# Patient Record
Sex: FEMALE | Race: WHITE | Employment: FULL TIME | URBAN - METROPOLITAN AREA
[De-identification: names, ages, dates, MRNs, and addresses within clinical notes are randomized per-mention and may not be internally consistent; named-entity substitution may affect disease eponyms.]

---

## 2017-01-09 RX ORDER — ACETAMINOPHEN 160 MG
2000 TABLET,DISINTEGRATING ORAL DAILY
COMMUNITY
End: 2018-06-29

## 2017-02-13 ENCOUNTER — LAB ENCOUNTER (OUTPATIENT)
Dept: LAB | Facility: HOSPITAL | Age: 62
End: 2017-02-13
Payer: COMMERCIAL

## 2017-02-13 ENCOUNTER — HOSPITAL ENCOUNTER (OUTPATIENT)
Dept: PHYSICAL THERAPY | Facility: HOSPITAL | Age: 62
Setting detail: THERAPIES SERIES
Discharge: HOME | End: 2017-02-13
Attending: ORTHOPAEDIC SURGERY
Payer: COMMERCIAL

## 2017-02-13 DIAGNOSIS — M17.11 PRIMARY OSTEOARTHRITIS OF RIGHT KNEE: ICD-10-CM

## 2017-02-13 LAB
ANTIBODY SCREEN: NEGATIVE
RH BLOOD TYPE: POSITIVE

## 2017-02-13 PROCEDURE — 86900 BLOOD TYPING SEROLOGIC ABO: CPT

## 2017-02-13 PROCEDURE — 86901 BLOOD TYPING SEROLOGIC RH(D): CPT

## 2017-02-13 PROCEDURE — 86850 RBC ANTIBODY SCREEN: CPT

## 2017-02-13 PROCEDURE — 36415 COLL VENOUS BLD VENIPUNCTURE: CPT

## 2017-02-14 LAB
BASOPHILS # BLD AUTO: 0.05 X10(3) UL (ref 0–0.1)
BASOPHILS NFR BLD AUTO: 0.9 %
BUN BLD-MCNC: 9 MG/DL (ref 8–20)
CALCIUM BLD-MCNC: 9.3 MG/DL (ref 8.3–10.3)
CHLORIDE: 101 MMOL/L (ref 101–111)
CO2: 31 MMOL/L (ref 22–32)
CREAT BLD-MCNC: 0.79 MG/DL (ref 0.55–1.02)
EOSINOPHIL # BLD AUTO: 0.15 X10(3) UL (ref 0–0.3)
EOSINOPHIL NFR BLD AUTO: 2.6 %
ERYTHROCYTE [DISTWIDTH] IN BLOOD BY AUTOMATED COUNT: 12.7 % (ref 11.5–16)
GLUCOSE BLD-MCNC: 101 MG/DL (ref 70–99)
HCT VFR BLD AUTO: 42.2 % (ref 34–50)
HGB BLD-MCNC: 14.3 G/DL (ref 12–16)
IMMATURE GRANULOCYTE COUNT: 0.02 X10(3) UL (ref 0–1)
IMMATURE GRANULOCYTE RATIO %: 0.3 %
LYMPHOCYTES # BLD AUTO: 1.6 X10(3) UL (ref 0.9–4)
LYMPHOCYTES NFR BLD AUTO: 27.6 %
MCH RBC QN AUTO: 29.4 PG (ref 27–33.2)
MCHC RBC AUTO-ENTMCNC: 33.9 G/DL (ref 31–37)
MCV RBC AUTO: 86.8 FL (ref 81–100)
MONOCYTES # BLD AUTO: 0.5 X10(3) UL (ref 0.1–0.6)
MONOCYTES NFR BLD AUTO: 8.6 %
NEUTROPHIL ABS PRELIM: 3.48 X10 (3) UL (ref 1.3–6.7)
NEUTROPHILS # BLD AUTO: 3.48 X10(3) UL (ref 1.3–6.7)
NEUTROPHILS NFR BLD AUTO: 60 %
PLATELET # BLD AUTO: 255 10(3)UL (ref 150–450)
POTASSIUM SERPL-SCNC: 4.1 MMOL/L (ref 3.6–5.1)
RBC # BLD AUTO: 4.86 X10(6)UL (ref 3.8–5.1)
RED CELL DISTRIBUTION WIDTH-SD: 40 FL (ref 35.1–46.3)
SODIUM SERPL-SCNC: 138 MMOL/L (ref 136–144)
WBC # BLD AUTO: 5.8 X10(3) UL (ref 4–13)

## 2017-02-14 PROCEDURE — 87081 CULTURE SCREEN ONLY: CPT

## 2017-02-14 PROCEDURE — 80048 BASIC METABOLIC PNL TOTAL CA: CPT

## 2017-02-14 PROCEDURE — 85025 COMPLETE CBC W/AUTO DIFF WBC: CPT

## 2017-02-20 NOTE — H&P
[facility]    PATIENT'S NAME: [facility]   ATTENDING PHYSICIAN: Nisha Hernandez M.D.    PATIENT ACCOUNT#:   [de-identified]    LOCATION:  OR   [facility]  MEDICAL RECORD #:   [id number]       YOB: 1955  ADMISSION DATE:       02/21/2017    H postoperative scenarios are stressed. Possibility of continued pain, stiffness, vascular compromise, DVT, infection, hemarthrosis, surgical failure, residual disability is understood. She is well counseled. She will go to surgery with informed consent.

## 2017-02-21 ENCOUNTER — APPOINTMENT (OUTPATIENT)
Dept: GENERAL RADIOLOGY | Facility: HOSPITAL | Age: 62
DRG: 470 | End: 2017-02-21
Attending: RADIOLOGY
Payer: COMMERCIAL

## 2017-02-21 ENCOUNTER — SURGERY (OUTPATIENT)
Age: 62
End: 2017-02-21

## 2017-02-21 ENCOUNTER — HOSPITAL ENCOUNTER (INPATIENT)
Facility: HOSPITAL | Age: 62
LOS: 2 days | Discharge: HOME HEALTH | DRG: 470 | End: 2017-02-23
Attending: ORTHOPAEDIC SURGERY | Admitting: ORTHOPAEDIC SURGERY
Payer: COMMERCIAL

## 2017-02-21 DIAGNOSIS — M17.11 PRIMARY OSTEOARTHRITIS OF RIGHT KNEE: Primary | ICD-10-CM

## 2017-02-21 LAB — CREAT BLD-MCNC: 0.8 MG/DL (ref 0.55–1.02)

## 2017-02-21 PROCEDURE — 97116 GAIT TRAINING THERAPY: CPT

## 2017-02-21 PROCEDURE — 73560 X-RAY EXAM OF KNEE 1 OR 2: CPT

## 2017-02-21 PROCEDURE — 97161 PT EVAL LOW COMPLEX 20 MIN: CPT

## 2017-02-21 PROCEDURE — 82565 ASSAY OF CREATININE: CPT | Performed by: ORTHOPAEDIC SURGERY

## 2017-02-21 PROCEDURE — 88305 TISSUE EXAM BY PATHOLOGIST: CPT | Performed by: ORTHOPAEDIC SURGERY

## 2017-02-21 PROCEDURE — 97530 THERAPEUTIC ACTIVITIES: CPT

## 2017-02-21 PROCEDURE — 0SRC0J9 REPLACEMENT OF RIGHT KNEE JOINT WITH SYNTHETIC SUBSTITUTE, CEMENTED, OPEN APPROACH: ICD-10-PCS | Performed by: ORTHOPAEDIC SURGERY

## 2017-02-21 PROCEDURE — 88311 DECALCIFY TISSUE: CPT | Performed by: ORTHOPAEDIC SURGERY

## 2017-02-21 PROCEDURE — 76942 ECHO GUIDE FOR BIOPSY: CPT | Performed by: ORTHOPAEDIC SURGERY

## 2017-02-21 PROCEDURE — 3E0T3CZ INTRODUCTION OF REGIONAL ANESTHETIC INTO PERIPHERAL NERVES AND PLEXI, PERCUTANEOUS APPROACH: ICD-10-PCS | Performed by: ANESTHESIOLOGY

## 2017-02-21 DEVICE — ALL POLY PAT VE 32MM: Type: IMPLANTABLE DEVICE | Site: KNEE | Status: FUNCTIONAL

## 2017-02-21 DEVICE — PSI PSN PREF CR PIN GUIDE: Type: IMPLANTABLE DEVICE | Site: KNEE | Status: FUNCTIONAL

## 2017-02-21 DEVICE — PSN STR HYB ST 14X+30 M: Type: IMPLANTABLE DEVICE | Site: KNEE | Status: FUNCTIONAL

## 2017-02-21 DEVICE — KIT TISS CLOS TISSEEL 4ML: Type: IMPLANTABLE DEVICE | Site: KNEE | Status: FUNCTIONAL

## 2017-02-21 DEVICE — CEMENT BONE ZIM PALICOS R +G: Type: IMPLANTABLE DEVICE | Site: KNEE | Status: FUNCTIONAL

## 2017-02-21 DEVICE — PSN TIB STM 5 DEG SZ E R: Type: IMPLANTABLE DEVICE | Site: KNEE | Status: FUNCTIONAL

## 2017-02-21 RX ORDER — OXYCODONE HYDROCHLORIDE 10 MG/1
20 TABLET ORAL EVERY 4 HOURS PRN
Status: ACTIVE | OUTPATIENT
Start: 2017-02-21 | End: 2017-02-22

## 2017-02-21 RX ORDER — OXYCODONE HYDROCHLORIDE 10 MG/1
10 TABLET ORAL EVERY 4 HOURS PRN
Status: DISPENSED | OUTPATIENT
Start: 2017-02-21 | End: 2017-02-22

## 2017-02-21 RX ORDER — MELATONIN
325
Status: DISCONTINUED | OUTPATIENT
Start: 2017-02-21 | End: 2017-02-23

## 2017-02-21 RX ORDER — ACETAMINOPHEN 325 MG/1
650 TABLET ORAL ONCE
Status: COMPLETED | OUTPATIENT
Start: 2017-02-21 | End: 2017-02-21

## 2017-02-21 RX ORDER — HYDROCODONE BITARTRATE AND ACETAMINOPHEN 10; 325 MG/1; MG/1
1 TABLET ORAL EVERY 4 HOURS PRN
Status: DISCONTINUED | OUTPATIENT
Start: 2017-02-22 | End: 2017-02-23

## 2017-02-21 RX ORDER — HYDROCODONE BITARTRATE AND ACETAMINOPHEN 10; 325 MG/1; MG/1
2 TABLET ORAL EVERY 4 HOURS PRN
Status: DISCONTINUED | OUTPATIENT
Start: 2017-02-22 | End: 2017-02-23

## 2017-02-21 RX ORDER — ACETAMINOPHEN 325 MG/1
TABLET ORAL
Status: COMPLETED
Start: 2017-02-21 | End: 2017-02-21

## 2017-02-21 RX ORDER — KETOROLAC TROMETHAMINE 30 MG/ML
30 INJECTION, SOLUTION INTRAMUSCULAR; INTRAVENOUS EVERY 6 HOURS
Status: COMPLETED | OUTPATIENT
Start: 2017-02-21 | End: 2017-02-22

## 2017-02-21 RX ORDER — SCOLOPAMINE TRANSDERMAL SYSTEM 1 MG/1
1 PATCH, EXTENDED RELEASE TRANSDERMAL ONCE
Status: DISCONTINUED | OUTPATIENT
Start: 2017-02-21 | End: 2017-02-23

## 2017-02-21 RX ORDER — OXYCODONE HCL 10 MG/1
10 TABLET, FILM COATED, EXTENDED RELEASE ORAL
Status: COMPLETED | OUTPATIENT
Start: 2017-02-21 | End: 2017-02-21

## 2017-02-21 RX ORDER — CYCLOBENZAPRINE HCL 10 MG
10 TABLET ORAL EVERY 8 HOURS PRN
Status: DISCONTINUED | OUTPATIENT
Start: 2017-02-21 | End: 2017-02-21

## 2017-02-21 RX ORDER — POLYETHYLENE GLYCOL 3350 17 G/17G
17 POWDER, FOR SOLUTION ORAL DAILY PRN
Status: DISCONTINUED | OUTPATIENT
Start: 2017-02-21 | End: 2017-02-23

## 2017-02-21 RX ORDER — HYDROMORPHONE HYDROCHLORIDE 1 MG/ML
0.5 INJECTION, SOLUTION INTRAMUSCULAR; INTRAVENOUS; SUBCUTANEOUS EVERY 2 HOUR PRN
Status: ACTIVE | OUTPATIENT
Start: 2017-02-21 | End: 2017-02-23

## 2017-02-21 RX ORDER — DOCUSATE SODIUM 100 MG/1
100 CAPSULE, LIQUID FILLED ORAL 2 TIMES DAILY
Status: DISCONTINUED | OUTPATIENT
Start: 2017-02-21 | End: 2017-02-23

## 2017-02-21 RX ORDER — SODIUM CHLORIDE, SODIUM LACTATE, POTASSIUM CHLORIDE, CALCIUM CHLORIDE 600; 310; 30; 20 MG/100ML; MG/100ML; MG/100ML; MG/100ML
INJECTION, SOLUTION INTRAVENOUS CONTINUOUS
Status: DISCONTINUED | OUTPATIENT
Start: 2017-02-21 | End: 2017-02-21 | Stop reason: HOSPADM

## 2017-02-21 RX ORDER — MIDAZOLAM HYDROCHLORIDE 1 MG/ML
1 INJECTION INTRAMUSCULAR; INTRAVENOUS EVERY 5 MIN PRN
Status: DISCONTINUED | OUTPATIENT
Start: 2017-02-21 | End: 2017-02-21 | Stop reason: HOSPADM

## 2017-02-21 RX ORDER — HYDROMORPHONE HYDROCHLORIDE 1 MG/ML
0.2 INJECTION, SOLUTION INTRAMUSCULAR; INTRAVENOUS; SUBCUTANEOUS EVERY 2 HOUR PRN
Status: ACTIVE | OUTPATIENT
Start: 2017-02-21 | End: 2017-02-23

## 2017-02-21 RX ORDER — CETIRIZINE HYDROCHLORIDE 10 MG/1
10 TABLET ORAL DAILY
Status: DISCONTINUED | OUTPATIENT
Start: 2017-02-22 | End: 2017-02-23

## 2017-02-21 RX ORDER — METOCLOPRAMIDE HYDROCHLORIDE 5 MG/ML
10 INJECTION INTRAMUSCULAR; INTRAVENOUS EVERY 6 HOURS PRN
Status: ACTIVE | OUTPATIENT
Start: 2017-02-21 | End: 2017-02-23

## 2017-02-21 RX ORDER — OXYCODONE HYDROCHLORIDE 15 MG/1
15 TABLET ORAL EVERY 4 HOURS PRN
Status: ACTIVE | OUTPATIENT
Start: 2017-02-21 | End: 2017-02-22

## 2017-02-21 RX ORDER — OXYCODONE HYDROCHLORIDE 5 MG/1
5 TABLET ORAL EVERY 4 HOURS PRN
Status: ACTIVE | OUTPATIENT
Start: 2017-02-21 | End: 2017-02-22

## 2017-02-21 RX ORDER — BISACODYL 10 MG
10 SUPPOSITORY, RECTAL RECTAL
Status: DISCONTINUED | OUTPATIENT
Start: 2017-02-21 | End: 2017-02-23

## 2017-02-21 RX ORDER — DIPHENHYDRAMINE HCL 25 MG
25 CAPSULE ORAL EVERY 4 HOURS PRN
Status: DISCONTINUED | OUTPATIENT
Start: 2017-02-21 | End: 2017-02-21

## 2017-02-21 RX ORDER — HYDROMORPHONE HYDROCHLORIDE 1 MG/ML
INJECTION, SOLUTION INTRAMUSCULAR; INTRAVENOUS; SUBCUTANEOUS
Status: COMPLETED
Start: 2017-02-21 | End: 2017-02-21

## 2017-02-21 RX ORDER — METOCLOPRAMIDE HYDROCHLORIDE 5 MG/ML
10 INJECTION INTRAMUSCULAR; INTRAVENOUS AS NEEDED
Status: DISCONTINUED | OUTPATIENT
Start: 2017-02-21 | End: 2017-02-21 | Stop reason: HOSPADM

## 2017-02-21 RX ORDER — DIPHENHYDRAMINE HYDROCHLORIDE 50 MG/ML
25 INJECTION INTRAMUSCULAR; INTRAVENOUS ONCE AS NEEDED
Status: DISCONTINUED | OUTPATIENT
Start: 2017-02-21 | End: 2017-02-21

## 2017-02-21 RX ORDER — CYCLOBENZAPRINE HCL 5 MG
5 TABLET ORAL EVERY 8 HOURS PRN
Status: DISCONTINUED | OUTPATIENT
Start: 2017-02-21 | End: 2017-02-23

## 2017-02-21 RX ORDER — ACETAMINOPHEN 325 MG/1
650 TABLET ORAL 4 TIMES DAILY
Status: COMPLETED | OUTPATIENT
Start: 2017-02-21 | End: 2017-02-21

## 2017-02-21 RX ORDER — ONDANSETRON 2 MG/ML
4 INJECTION INTRAMUSCULAR; INTRAVENOUS EVERY 4 HOURS PRN
Status: DISCONTINUED | OUTPATIENT
Start: 2017-02-21 | End: 2017-02-23

## 2017-02-21 RX ORDER — ONDANSETRON 2 MG/ML
4 INJECTION INTRAMUSCULAR; INTRAVENOUS AS NEEDED
Status: DISCONTINUED | OUTPATIENT
Start: 2017-02-21 | End: 2017-02-21 | Stop reason: HOSPADM

## 2017-02-21 RX ORDER — DIPHENHYDRAMINE HYDROCHLORIDE 50 MG/ML
12.5 INJECTION INTRAMUSCULAR; INTRAVENOUS AS NEEDED
Status: DISCONTINUED | OUTPATIENT
Start: 2017-02-21 | End: 2017-02-21 | Stop reason: HOSPADM

## 2017-02-21 RX ORDER — ZOLPIDEM TARTRATE 5 MG/1
5 TABLET ORAL NIGHTLY PRN
Status: DISCONTINUED | OUTPATIENT
Start: 2017-02-21 | End: 2017-02-23

## 2017-02-21 RX ORDER — HYDROMORPHONE HYDROCHLORIDE 1 MG/ML
0.4 INJECTION, SOLUTION INTRAMUSCULAR; INTRAVENOUS; SUBCUTANEOUS EVERY 2 HOUR PRN
Status: ACTIVE | OUTPATIENT
Start: 2017-02-21 | End: 2017-02-23

## 2017-02-21 RX ORDER — ACETAMINOPHEN 160 MG
2000 TABLET,DISINTEGRATING ORAL DAILY
Status: DISCONTINUED | OUTPATIENT
Start: 2017-02-21 | End: 2017-02-23

## 2017-02-21 RX ORDER — SODIUM CHLORIDE, SODIUM LACTATE, POTASSIUM CHLORIDE, CALCIUM CHLORIDE 600; 310; 30; 20 MG/100ML; MG/100ML; MG/100ML; MG/100ML
INJECTION, SOLUTION INTRAVENOUS CONTINUOUS
Status: DISCONTINUED | OUTPATIENT
Start: 2017-02-21 | End: 2017-02-23

## 2017-02-21 RX ORDER — OXYCODONE HCL 10 MG/1
10 TABLET, FILM COATED, EXTENDED RELEASE ORAL
Status: COMPLETED | OUTPATIENT
Start: 2017-02-21 | End: 2017-02-22

## 2017-02-21 RX ORDER — SODIUM CHLORIDE 9 MG/ML
INJECTION, SOLUTION INTRAVENOUS CONTINUOUS
Status: DISCONTINUED | OUTPATIENT
Start: 2017-02-21 | End: 2017-02-23

## 2017-02-21 RX ORDER — PANTOPRAZOLE SODIUM 20 MG/1
20 TABLET, DELAYED RELEASE ORAL
Status: DISCONTINUED | OUTPATIENT
Start: 2017-02-22 | End: 2017-02-23

## 2017-02-21 RX ORDER — DIPHENHYDRAMINE HYDROCHLORIDE 50 MG/ML
12.5 INJECTION INTRAMUSCULAR; INTRAVENOUS EVERY 4 HOURS PRN
Status: DISCONTINUED | OUTPATIENT
Start: 2017-02-21 | End: 2017-02-21

## 2017-02-21 RX ORDER — CYCLOBENZAPRINE HCL 5 MG
5 TABLET ORAL 3 TIMES DAILY PRN
Status: DISCONTINUED | OUTPATIENT
Start: 2017-02-21 | End: 2017-02-21 | Stop reason: HOSPADM

## 2017-02-21 RX ORDER — HYDROMORPHONE HYDROCHLORIDE 1 MG/ML
0.4 INJECTION, SOLUTION INTRAMUSCULAR; INTRAVENOUS; SUBCUTANEOUS EVERY 5 MIN PRN
Status: DISCONTINUED | OUTPATIENT
Start: 2017-02-21 | End: 2017-02-21 | Stop reason: HOSPADM

## 2017-02-21 RX ORDER — MEPERIDINE HYDROCHLORIDE 25 MG/ML
12.5 INJECTION INTRAMUSCULAR; INTRAVENOUS; SUBCUTANEOUS AS NEEDED
Status: DISCONTINUED | OUTPATIENT
Start: 2017-02-21 | End: 2017-02-21 | Stop reason: HOSPADM

## 2017-02-21 RX ORDER — HYDROMORPHONE HYDROCHLORIDE 1 MG/ML
0.3 INJECTION, SOLUTION INTRAMUSCULAR; INTRAVENOUS; SUBCUTANEOUS EVERY 2 HOUR PRN
Status: ACTIVE | OUTPATIENT
Start: 2017-02-21 | End: 2017-02-23

## 2017-02-21 RX ORDER — DEXAMETHASONE SODIUM PHOSPHATE 4 MG/ML
4 VIAL (ML) INJECTION AS NEEDED
Status: DISCONTINUED | OUTPATIENT
Start: 2017-02-21 | End: 2017-02-21 | Stop reason: HOSPADM

## 2017-02-21 RX ORDER — ATORVASTATIN CALCIUM 10 MG/1
10 TABLET, FILM COATED ORAL NIGHTLY
Status: DISCONTINUED | OUTPATIENT
Start: 2017-02-21 | End: 2017-02-23

## 2017-02-21 RX ORDER — NALOXONE HYDROCHLORIDE 0.4 MG/ML
80 INJECTION, SOLUTION INTRAMUSCULAR; INTRAVENOUS; SUBCUTANEOUS AS NEEDED
Status: DISCONTINUED | OUTPATIENT
Start: 2017-02-21 | End: 2017-02-21 | Stop reason: HOSPADM

## 2017-02-21 RX ORDER — SODIUM PHOSPHATE, DIBASIC AND SODIUM PHOSPHATE, MONOBASIC 7; 19 G/133ML; G/133ML
1 ENEMA RECTAL ONCE AS NEEDED
Status: ACTIVE | OUTPATIENT
Start: 2017-02-21 | End: 2017-02-21

## 2017-02-21 NOTE — BRIEF OP NOTE
[address] [facility]  Brief Op Note     Whitney Landon Location: OR   [facility] [id number] MRN [id number]   Admission Date 2/21/2017 Operation Date 2/21/2017   Attending Physician Maria M Wheat MD Operating Physician Chiqui Mensah MD       Pre-Operativ

## 2017-02-21 NOTE — INTERVAL H&P NOTE
Pre-op Diagnosis: OSTEOARTHRITIS RIGHT KNEE    The above referenced H&P was reviewed by Shamar Salinas MD on 2/21/2017, the patient was examined and no significant changes have occurred in the patient's condition since the H&P was performed.   I discussed

## 2017-02-21 NOTE — PROGRESS NOTES
NURSING ADMISSION NOTE      Patient admitted via bed  Oriented to room. Safety precautions initiated. Bed in low position. Call light in reach.   Dr. Oniel Figueroa notified of admission and is currently here to see patient

## 2017-02-21 NOTE — PHYSICAL THERAPY NOTE
PHYSICAL THERAPY KNEE EVALUATION - INPATIENT     Room Number: 358/358-A  Evaluation Date: 2/21/2017  Type of Evaluation: Initial  Physician Order: PT Eval and Treat    Presenting Problem: R TKA  Reason for Therapy: Mobility Dysfunction and Discharge Planni Layout: Multi-level  Stairs to Enter : 2     Stairs to Bedroom: 7  Railing: Yes    Lives With: Spouse  Drives: Yes  Patient Owned Equipment: None       Prior Level of [place]: ind in all aspect of her mobilities s any use of an AD    SUBJECTIVE  Will Assistance: Maximum assistance (per FIM: actual min A)  Distance (ft): 60  Assistive Device: Rolling walker  Pattern: R Decreased stance time (decrease swing on the R LE)  Stoop/Curb Assistance: Not tested       Skilled Therapy Provided: Lying in bed and i education; Family education;Gait training;Range of motion;Strengthening;Stoop training;Stair training;Transfer training;Balance training  Rehab Potential : Good  Frequency (Obs): BID  Number of Visits to Meet Established Goals: 5    CURRENT GOALS   Goal #1

## 2017-02-21 NOTE — OPERATIVE REPORT
[facility]    PATIENT'S NAME: [facility]   ATTENDING PHYSICIAN: Nisha Hernandez M.D. OPERATING PHYSICIAN: Nisha Hernandez M.D.    PATIENT ACCOUNT#:   [de-identified]    LOCATION:  PACU [facility] PACU 2 [address] [facility] #:   [id number]       DATE Time-out occurs. The usual midline incision is carried out followed by median parapatellar arthrotomy. The deep medial collateral ligament is cautiously released. The knee is hyperflexed. The patella is everted. Synovitis is seen.   Advanced arthritic recovery room awake, in stable condition.     Dictated By Emilia Stone M.D.  d: 02/21/2017 08:41:26  t: 02/21/2017 10:31:36  [facility] [id number]  MA/

## 2017-02-21 NOTE — CONSULTS
[facility] Hospitalist Initial Consult       Reason for consult: Medical Management sp R TKA      History of Present Illness: Patient is a 63year old female with PMH sig for HL and OA who presents sp R TKA.    The pt tolerated the procedure well without any immed hearing loss   • High Cholesterol Sister    • Hypertension Sister    • Diabetes Sister    • Clotting Disorder Sister      DVT   • Obesity Sister    • Kidney Disease Sister      stones   • Arthritis Sister      TKA   • [place] Man Sister      PMR normal. No rashes or lesions. Neurologic: Normal strength, no focal deficit appreciated     Laboratory:  No results for input(s): WBC, HGB, MCV, PLT, BAND, INR in the last 72 hours.     Invalid input(s): LYM#, MONO#, BASOS#, EOSIN#    Recent Labs   Lab

## 2017-02-21 NOTE — PLAN OF CARE
DISCHARGE PLANNING    • Discharge to home or other facility with appropriate resources Progressing        PAIN - ADULT    • Verbalizes/displays adequate comfort level or patient's stated pain goal Progressing        RISK FOR INFECTION - ADULT    • Absence

## 2017-02-22 LAB
BUN BLD-MCNC: 8 MG/DL (ref 8–20)
CALCIUM BLD-MCNC: 8.4 MG/DL (ref 8.3–10.3)
CHLORIDE: 103 MMOL/L (ref 101–111)
CO2: 22 MMOL/L (ref 22–32)
CREAT BLD-MCNC: 0.66 MG/DL (ref 0.55–1.02)
ERYTHROCYTE [DISTWIDTH] IN BLOOD BY AUTOMATED COUNT: 12.7 % (ref 11.5–16)
GLUCOSE BLD-MCNC: 102 MG/DL (ref 70–99)
HCT VFR BLD AUTO: 31.8 % (ref 34–50)
HGB BLD-MCNC: 10.8 G/DL (ref 12–16)
MCH RBC QN AUTO: 29.3 PG (ref 27–33.2)
MCHC RBC AUTO-ENTMCNC: 34 G/DL (ref 31–37)
MCV RBC AUTO: 86.2 FL (ref 81–100)
PLATELET # BLD AUTO: 203 10(3)UL (ref 150–450)
POTASSIUM SERPL-SCNC: 3.7 MMOL/L (ref 3.6–5.1)
RBC # BLD AUTO: 3.69 X10(6)UL (ref 3.8–5.1)
RED CELL DISTRIBUTION WIDTH-SD: 39.7 FL (ref 35.1–46.3)
SODIUM SERPL-SCNC: 135 MMOL/L (ref 136–144)
WBC # BLD AUTO: 9.3 X10(3) UL (ref 4–13)

## 2017-02-22 PROCEDURE — 97535 SELF CARE MNGMENT TRAINING: CPT

## 2017-02-22 PROCEDURE — 97150 GROUP THERAPEUTIC PROCEDURES: CPT

## 2017-02-22 PROCEDURE — 97116 GAIT TRAINING THERAPY: CPT

## 2017-02-22 PROCEDURE — 85027 COMPLETE CBC AUTOMATED: CPT | Performed by: ORTHOPAEDIC SURGERY

## 2017-02-22 PROCEDURE — 80048 BASIC METABOLIC PNL TOTAL CA: CPT | Performed by: ORTHOPAEDIC SURGERY

## 2017-02-22 PROCEDURE — 97165 OT EVAL LOW COMPLEX 30 MIN: CPT

## 2017-02-22 RX ORDER — POTASSIUM CHLORIDE 20 MEQ/1
40 TABLET, EXTENDED RELEASE ORAL ONCE
Status: COMPLETED | OUTPATIENT
Start: 2017-02-22 | End: 2017-02-22

## 2017-02-22 NOTE — PROGRESS NOTES
Post Op Day 1 Ortho Note    Status Post Nerve Block:  Type of Nerve Block: Right Femoral  Single Injection Nerve Block    Post op review: No evidence of immediate block related complications, No paresthesia noted and Able to plantar flex foot    Patient

## 2017-02-22 NOTE — CM/SW NOTE
02/22/17 1300   CM/SW Referral Data   Referral Source Physician   Reason for Referral Discharge planning   Informant Patient;[facility] Staff   Pertinent Medical Hx   Primary Care Physician Name DALTON Sheets MD   Patient Info   Patient's Mental Status Alert;[facility] for VN/PT services. She will also arrange for CPM machine.

## 2017-02-22 NOTE — PROGRESS NOTES
[facility]  Orthopedic Surgery  Progress Note    Chandrika Elmore Patient Status:  Inpatient    1955 MRN [id number]   [facility] 3SW-A Attending Sarah Breaux MD   Hosp Day # 1 PCP Mahsa Santiago MD     SUBJECTIVE:  Donta Linn input(s): RBC, HGB, HCT, MCV, MCH, MCHC, RDW, NEPRELIM, WBC, PLT in the last 72 hours. No results for input(s): PTP, INR in the last 72 hours. ASSESSMENT/PLAN:  1. Continue pain management  2. Continue DVT prophylaxis   3. Continue PT/OT  4.  Radha Genre

## 2017-02-22 NOTE — PROGRESS NOTES
POD #1 Joint newsletter given to patient. Reviewed indications, side effects of pain medication/narcotics and constipation prevention.  Stressed importance of increased fluids/roughage in diet, continued use of stool softeners along with laxatives and suppo

## 2017-02-22 NOTE — HOME CARE LIAISON
Received referral for [facility] on d/c for SN/PT. Met with patient who is agreeable to [facility] on d/c. Agency brochure given to patient. Referral sent to [facility] via [facility].      Thank you for this referral,   Francis House

## 2017-02-22 NOTE — OCCUPATIONAL THERAPY NOTE
OCCUPATIONAL THERAPY QUICK EVALUATION - INPATIENT    Room Number: 358/358-A  Evaluation Date: 2/22/2017     Type of Evaluation: Quick Eval  Presenting Problem: s/p R TKA on 2/21/17    Physician Order: IP Consult to Occupational Therapy  Reason for Therapy: [place]: Patient reports to being independent in all ADL's with no use of AD. SUBJECTIVE  \"My sister had both of her knees done so she told me all about what to expect. \"    OBJECTIVE     Fall Risk: Standard fall risk    WEIGHT BEARING RESTRICTIO educated patient on available AE for donning/doffing socks and shoes; patient reports that she will have help at home for socks and will wear slip on shoes at home>standing via RW and supervision, cueing for safe hand placement> performed functional mobili functional limitation presents during this admission. Patient was able to achieve the following goals:  Patient able to toilet transfer:  At supervision level or above; patient reports will have supervision at home  Patient able to dress lower extremitie

## 2017-02-22 NOTE — PHYSICAL THERAPY NOTE
PHYSICAL THERAPY KNEE TREATMENT NOTE - INPATIENT     Room Number: 358/358-A     Session: 1 and 2   Number of Visits to Meet Established Goals: 5    Presenting Problem: R TKA    Problem List  Active Problems:    Primary osteoarthritis of right knee      Pa Turning over in bed (including adjusting bedclothes, sheets and blankets)?: A Little   -   Sitting down on and standing up from a chair with arms (e.g., wheelchair, bedside commode, etc.): A Little   -   Moving from lying on back to sitting on the side of heel/toe raises 10 reps 15 reps   Standing knee flexion 10 reps 15 reps   Extension stretch  1x 1x     Comments: Pt participated in group session, tolerance was good.    was present  - yes   is a - family    Knee ROM   R Knee Flexion (degrees): 82

## 2017-02-23 VITALS
TEMPERATURE: 99 F | DIASTOLIC BLOOD PRESSURE: 82 MMHG | SYSTOLIC BLOOD PRESSURE: 138 MMHG | RESPIRATION RATE: 18 BRPM | HEIGHT: 64 IN | WEIGHT: 206 LBS | BODY MASS INDEX: 35.17 KG/M2 | HEART RATE: 91 BPM | OXYGEN SATURATION: 97 %

## 2017-02-23 PROBLEM — Z47.89 ORTHOPEDIC AFTERCARE: Status: ACTIVE | Noted: 2017-02-23

## 2017-02-23 LAB
ERYTHROCYTE [DISTWIDTH] IN BLOOD BY AUTOMATED COUNT: 13.1 % (ref 11.5–16)
HCT VFR BLD AUTO: 29.9 % (ref 34–50)
HGB BLD-MCNC: 10.4 G/DL (ref 12–16)
MCH RBC QN AUTO: 29.9 PG (ref 27–33.2)
MCHC RBC AUTO-ENTMCNC: 34.8 G/DL (ref 31–37)
MCV RBC AUTO: 85.9 FL (ref 81–100)
PLATELET # BLD AUTO: 186 10(3)UL (ref 150–450)
POTASSIUM SERPL-SCNC: 4 MMOL/L (ref 3.6–5.1)
RBC # BLD AUTO: 3.48 X10(6)UL (ref 3.8–5.1)
RED CELL DISTRIBUTION WIDTH-SD: 40.4 FL (ref 35.1–46.3)
WBC # BLD AUTO: 7 X10(3) UL (ref 4–13)

## 2017-02-23 PROCEDURE — 85027 COMPLETE CBC AUTOMATED: CPT | Performed by: ORTHOPAEDIC SURGERY

## 2017-02-23 PROCEDURE — 84132 ASSAY OF SERUM POTASSIUM: CPT | Performed by: ORTHOPAEDIC SURGERY

## 2017-02-23 PROCEDURE — 97150 GROUP THERAPEUTIC PROCEDURES: CPT

## 2017-02-23 PROCEDURE — 97116 GAIT TRAINING THERAPY: CPT

## 2017-02-23 RX ORDER — ONDANSETRON 4 MG/1
4 TABLET, ORALLY DISINTEGRATING ORAL EVERY 6 HOURS PRN
Status: DISCONTINUED | OUTPATIENT
Start: 2017-02-23 | End: 2017-02-23

## 2017-02-23 RX ORDER — ONDANSETRON HYDROCHLORIDE 8 MG/1
8 TABLET, FILM COATED ORAL EVERY 8 HOURS PRN
Qty: 30 TABLET | Refills: 0 | Status: SHIPPED | OUTPATIENT
Start: 2017-02-23 | End: 2017-03-15

## 2017-02-23 NOTE — PHYSICAL THERAPY NOTE
PHYSICAL THERAPY KNEE TREATMENT NOTE - INPATIENT     Room Number: 358/358-A     Session: 3  Number of Visits to Meet Established Goals: 5    Presenting Problem: R TKA    Problem List  Active Problems:    Primary osteoarthritis of right knee      Past Medi (including adjusting bedclothes, sheets and blankets)?: A Little   -   Sitting down on and standing up from a chair with arms (e.g., wheelchair, bedside commode, etc.): A Little   -   Moving from lying on back to sitting on the side of the bed?: A Little gait/transfers resulting in downgrade of overall functional mobility. Due to above deficits, Pt will benefit from continued IP PT, so that patient may achieve highest functional independence/return to baseline.  Recommend HHPT upon [facility] d/c.        DISCHARGE

## 2017-02-23 NOTE — PLAN OF CARE
Impaired Activities of Daily Living    • Achieve highest/safest level of independence in self care Progressing        Impaired Functional Mobility    • Achieve highest/safest level of mobility/gait Progressing        PAIN - ADULT    • Verbalizes/displays a

## 2017-02-23 NOTE — PROGRESS NOTES
Acute Pain Service    Post Op Day 2 Ortho Note    Patient states Pablo Perry is working well to manage pain; denies itching/nausea/dizziness. Patient able to bear weight on sx leg; equal sensation in BLE. No further recommendations. Will sign-off.       Plan d

## 2017-02-23 NOTE — PROGRESS NOTES
Patient and  () attended group discharge education lunch. Discharge education provided utilizing \"knee replacement discharge instructions\" sheet. Teach back done. Questions solicited and answered. Tolerated activity well.

## 2017-02-23 NOTE — CM/SW NOTE
02/23/17 1319   Discharge disposition   Discharged to: Home-Health   Name of Facillity/Home Care/Hospice Residential   HME provider ([address] for CPM)   Discharge transportation Private car

## 2017-02-23 NOTE — PLAN OF CARE
Pt discharge education completed with pt and spouse. All questions addressed. All pt belongings packed and sent with pt. Pt discharged home with HHPT via personal car.

## 2017-02-24 NOTE — DISCHARGE SUMMARY
Patient ID:  Gene Brown  [id number]  68 year old  8/16/1955    Admit Date: 2/21/2017    Discharge Date and Time: 2/23/2017     Attending Physician: Maria Eugenia Coronado MD    Reason for admission: OSTEOARTHRITIS RIGHT KNEE  Primary osteoarthritis of rig every 4-6 hours as needed for pain., Normal, Disp-60 tablet, R-0    !! HYDROcodone-acetaminophen (NORCO)  MG Oral Tab  Take 1-2 tablets every 4-6 hours as needed for pain.   Ok to refill T+7 days, Normal, Disp-60 tablet, R-0    !! - Potential duplicat

## 2017-03-15 PROBLEM — G89.29 CHRONIC PAIN OF RIGHT KNEE: Status: ACTIVE | Noted: 2017-03-15

## 2017-03-15 PROBLEM — M25.561 CHRONIC PAIN OF RIGHT KNEE: Status: ACTIVE | Noted: 2017-03-15

## 2017-04-19 PROBLEM — Z96.651 STATUS POST TOTAL RIGHT KNEE REPLACEMENT: Status: ACTIVE | Noted: 2017-04-19

## 2017-06-27 PROBLEM — M25.511 ACUTE PAIN OF RIGHT SHOULDER: Status: ACTIVE | Noted: 2017-06-27

## 2017-06-27 PROBLEM — G89.29 CHRONIC PAIN OF RIGHT KNEE: Status: RESOLVED | Noted: 2017-03-15 | Resolved: 2017-06-27

## 2017-06-27 PROBLEM — Z86.010 PERSONAL HISTORY OF COLONIC POLYPS: Status: ACTIVE | Noted: 2017-06-27

## 2017-06-27 PROBLEM — Z47.89 ORTHOPEDIC AFTERCARE: Status: RESOLVED | Noted: 2017-02-23 | Resolved: 2017-06-27

## 2017-06-27 PROBLEM — Z86.0100 PERSONAL HISTORY OF COLONIC POLYPS: Status: ACTIVE | Noted: 2017-06-27

## 2017-06-27 PROBLEM — M25.561 CHRONIC PAIN OF RIGHT KNEE: Status: RESOLVED | Noted: 2017-03-15 | Resolved: 2017-06-27

## 2017-06-27 PROCEDURE — 82607 VITAMIN B-12: CPT | Performed by: FAMILY MEDICINE

## 2017-06-27 PROCEDURE — 81001 URINALYSIS AUTO W/SCOPE: CPT | Performed by: FAMILY MEDICINE

## 2017-06-27 PROCEDURE — 82746 ASSAY OF FOLIC ACID SERUM: CPT | Performed by: FAMILY MEDICINE

## 2018-06-26 PROBLEM — R03.0 BORDERLINE HYPERTENSION: Status: ACTIVE | Noted: 2018-06-26

## 2018-06-26 PROBLEM — M25.511 ACUTE PAIN OF RIGHT SHOULDER: Status: RESOLVED | Noted: 2017-06-27 | Resolved: 2018-06-26

## 2018-06-26 PROBLEM — J30.1 SEASONAL ALLERGIC RHINITIS DUE TO POLLEN: Status: ACTIVE | Noted: 2018-06-26

## 2018-06-26 PROCEDURE — 82607 VITAMIN B-12: CPT | Performed by: FAMILY MEDICINE

## 2018-06-26 PROCEDURE — 86803 HEPATITIS C AB TEST: CPT | Performed by: FAMILY MEDICINE

## 2019-06-25 PROCEDURE — 81001 URINALYSIS AUTO W/SCOPE: CPT | Performed by: FAMILY MEDICINE

## 2019-08-09 PROBLEM — I70.0 AORTIC ATHEROSCLEROSIS (HCC): Status: ACTIVE | Noted: 2019-08-09

## 2019-08-09 PROBLEM — I70.0 AORTIC ATHEROSCLEROSIS: Status: ACTIVE | Noted: 2019-08-09

## 2021-04-26 ENCOUNTER — HOSPITAL ENCOUNTER (INPATIENT)
Facility: HOSPITAL | Age: 66
LOS: 2 days | Discharge: HOME | DRG: 603 | End: 2021-04-28
Attending: INTERNAL MEDICINE | Admitting: INTERNAL MEDICINE
Payer: COMMERCIAL

## 2021-04-26 DIAGNOSIS — E53.8 VITAMIN B 12 DEFICIENCY: ICD-10-CM

## 2021-04-26 PROBLEM — L03.90 CELLULITIS: Status: ACTIVE | Noted: 2021-04-26

## 2021-04-26 PROCEDURE — 80048 BASIC METABOLIC PNL TOTAL CA: CPT | Performed by: INTERNAL MEDICINE

## 2021-04-26 PROCEDURE — 85652 RBC SED RATE AUTOMATED: CPT | Performed by: INTERNAL MEDICINE

## 2021-04-26 PROCEDURE — 86140 C-REACTIVE PROTEIN: CPT | Performed by: INTERNAL MEDICINE

## 2021-04-26 PROCEDURE — S0077 INJECTION, CLINDAMYCIN PHOSP: HCPCS | Performed by: INTERNAL MEDICINE

## 2021-04-26 RX ORDER — MELATONIN
6 NIGHTLY PRN
Status: DISCONTINUED | OUTPATIENT
Start: 2021-04-26 | End: 2021-04-28

## 2021-04-26 RX ORDER — CLINDAMYCIN PHOSPHATE 600 MG/50ML
600 INJECTION INTRAVENOUS EVERY 8 HOURS
Status: DISCONTINUED | OUTPATIENT
Start: 2021-04-26 | End: 2021-04-27

## 2021-04-26 RX ORDER — ACETAMINOPHEN 325 MG/1
650 TABLET ORAL EVERY 6 HOURS PRN
Status: DISCONTINUED | OUTPATIENT
Start: 2021-04-26 | End: 2021-04-28

## 2021-04-26 RX ORDER — CETIRIZINE HYDROCHLORIDE 10 MG/1
10 TABLET ORAL DAILY
Status: DISCONTINUED | OUTPATIENT
Start: 2021-04-27 | End: 2021-04-28

## 2021-04-26 RX ORDER — PANTOPRAZOLE SODIUM 20 MG/1
20 TABLET, DELAYED RELEASE ORAL DAILY
Status: DISCONTINUED | OUTPATIENT
Start: 2021-04-27 | End: 2021-04-28

## 2021-04-26 RX ORDER — HEPARIN SODIUM 5000 [USP'U]/ML
5000 INJECTION, SOLUTION INTRAVENOUS; SUBCUTANEOUS EVERY 8 HOURS SCHEDULED
Status: DISCONTINUED | OUTPATIENT
Start: 2021-04-26 | End: 2021-04-28

## 2021-04-26 RX ORDER — ATORVASTATIN CALCIUM 10 MG/1
10 TABLET, FILM COATED ORAL DAILY
Status: DISCONTINUED | OUTPATIENT
Start: 2021-04-27 | End: 2021-04-28

## 2021-04-26 RX ORDER — IBUPROFEN 400 MG/1
400 TABLET ORAL EVERY 6 HOURS PRN
COMMUNITY

## 2021-04-26 RX ORDER — GARLIC EXTRACT 500 MG
1 CAPSULE ORAL DAILY
COMMUNITY
End: 2021-08-05

## 2021-04-26 RX ORDER — ACETAMINOPHEN 160 MG
4000 TABLET,DISINTEGRATING ORAL DAILY
Status: DISCONTINUED | OUTPATIENT
Start: 2021-04-26 | End: 2021-04-28

## 2021-04-26 RX ORDER — SODIUM CHLORIDE 9 MG/ML
INJECTION, SOLUTION INTRAVENOUS CONTINUOUS
Status: DISCONTINUED | OUTPATIENT
Start: 2021-04-26 | End: 2021-04-27

## 2021-04-26 RX ORDER — ONDANSETRON 2 MG/ML
4 INJECTION INTRAMUSCULAR; INTRAVENOUS EVERY 6 HOURS PRN
Status: DISCONTINUED | OUTPATIENT
Start: 2021-04-26 | End: 2021-04-28

## 2021-04-26 RX ORDER — GARLIC EXTRACT 500 MG
1 CAPSULE ORAL DAILY
Status: DISCONTINUED | OUTPATIENT
Start: 2021-04-27 | End: 2021-04-28

## 2021-04-27 ENCOUNTER — APPOINTMENT (OUTPATIENT)
Dept: CT IMAGING | Facility: HOSPITAL | Age: 66
DRG: 603 | End: 2021-04-27
Attending: INTERNAL MEDICINE
Payer: COMMERCIAL

## 2021-04-27 PROCEDURE — 80053 COMPREHEN METABOLIC PANEL: CPT | Performed by: INTERNAL MEDICINE

## 2021-04-27 PROCEDURE — 87449 NOS EACH ORGANISM AG IA: CPT | Performed by: INTERNAL MEDICINE

## 2021-04-27 PROCEDURE — 83735 ASSAY OF MAGNESIUM: CPT | Performed by: INTERNAL MEDICINE

## 2021-04-27 PROCEDURE — 70486 CT MAXILLOFACIAL W/O DYE: CPT | Performed by: INTERNAL MEDICINE

## 2021-04-27 PROCEDURE — 85025 COMPLETE CBC W/AUTO DIFF WBC: CPT | Performed by: INTERNAL MEDICINE

## 2021-04-27 PROCEDURE — S0077 INJECTION, CLINDAMYCIN PHOSP: HCPCS | Performed by: INTERNAL MEDICINE

## 2021-04-27 RX ORDER — POTASSIUM CHLORIDE 20 MEQ/1
40 TABLET, EXTENDED RELEASE ORAL ONCE
Status: COMPLETED | OUTPATIENT
Start: 2021-04-27 | End: 2021-04-27

## 2021-04-27 NOTE — PROGRESS NOTES
NURSING ADMISSION NOTE      Patient admitted via Wheelchair  Oriented to room 3610  Safety precautions initiated. Bed in low position. Call light in reach. Direct transfer from Immediate care for worsening cellulitis to left side of face.  Admissio

## 2021-04-27 NOTE — CONSULTS
INFECTIOUS DISEASE CONSULTATION    Azra Caro Patient Status:  Observation    1955 MRN [id number]   [facility] 3NE-A Attending Satinder Hancock, DO   Hosp Day # 0 PCP Aki Bragg Father 54        CABGx4   • Ear Problems Father         hearing loss   • Heart Disorder Father    • Hypertension Father    • High Cholesterol Sister    • Hypertension Sister    • Diabetes Sister    • Clotting Disorder Sister         DVT   • Obesity Sister capsule, Oral, Daily  •  atorvastatin (LIPITOR) tab 10 mg, 10 mg, Oral, Daily  •  cetirizine (ZYRTEC) tab 10 mg, 10 mg, Oral, Daily  •  melatonin tab 6 mg, 6 mg, Oral, Nightly PRN  •  Pantoprazole Sodium (PROTONIX) EC tab 20 mg, 20 mg, Oral, Daily  •  Roseann 122/78  HEENT:bilateral periorbital swelling  Neck: No swelling, no masses  Respiratory: Non labored, symmetric exursion  Cardiovascular: No irregularities in rhythm  Abdomen: Soft, nontender, nondistended.     Musculoskeletal: Full range of motion of all e been on clindamycin since 4/21  Remote PCN allergy    Ceftriaxone 2g daily  CT sinus    2.  Fully immunized, completing vaccine series for COVID-19 on 4/1          Jojo Gallardo MD  [facility]  [phone]

## 2021-04-27 NOTE — PLAN OF CARE
Patient A&O x4. Room air. VSS. IV Clindamycin q 8 for facial cellulitis. Consult placed to ID. 0.9%NS at 75 mL. Safety precautions in place. Will continue to monitor.       Problem: Patient/Family Goals  Goal: Patient/Family Long Term Goal  Description: Renee Morris

## 2021-04-27 NOTE — PROGRESS NOTES
Pt is alert and oriented and is up to the bathroom. Pt has swelling to left face and has requested tylenol for discomfort with good relief.

## 2021-04-27 NOTE — H&P
[facility] Hospitalist History and Physical      Chief complaint:   L facial pain and swelling     PCP: Jeff Lorenzo MD      History of Present Illness: Patient is a 63year old female with PMH sig for HTN, HLD, and GERD who presented to the [facility] yesterday c/o L 1/1986   • PERIPHERAL VASCULAR SCREENING HISTORICAL CONV Bilateral 8/1/2016    PAD screen is normal   • TOTAL KNEE REPLACEMENT Right 02/22/2017        ALL:    Amoxicillin-Pot Cla*    RASH     No current facility-administered medications on file prior to en • Hypertension Father    • High Cholesterol Sister    • Hypertension Sister    • Diabetes Sister    • Clotting Disorder Sister         DVT   • Obesity Sister    • Kidney Disease Sister         stones   • Arthritis Sister         TKA   • Cancer Sister HGB 12.6 04/27/2021    HCT 36.9 04/27/2021    .0 04/27/2021    CREATSERUM 0.78 04/27/2021    BUN 7 04/27/2021     04/27/2021    K 3.8 04/27/2021    CL 96 04/27/2021    CO2 28.0 04/27/2021     04/27/2021    CA 9.4 04/27/2021    ALB 3.7 0 vasculature of the neck are unremarkable. Moderate degenerative cervical spondylosis. No aggressive osseous lesion. Lung apices are clear.     IMPRESSION: 1.  Stable inflammatory stranding along the left cheek, with slightly PROGRESSED thickening of the lef appearing with no definite intraglandular masses seen. A couple of small submandibular lymph nodes are seen, possibly reactive in nature. The masseter muscles and the parotid glands appear essentially symmetric and grossly unremarkable.  There is no signifi lower cervical spine. 6. Please see above for details and additional findings and correlate clinically. Assessment/Plan:     63 yr old female with PMH sig for HTN, HLD, and GERD who presented to the [facility] yesterday c/o L facial swelling.      # L facial

## 2021-04-27 NOTE — PLAN OF CARE
A&Ox4. On room air, lungs clear and diminished. Abdomen soft and nontender, BS active. Denies N&V at this time. Left face and cheek with redness, swelling, and tenderness. Receiving IV Clindamycin q8h for facial cellulitis. Denies pain.  IVF infusing - 0.9

## 2021-04-28 VITALS
HEART RATE: 79 BPM | TEMPERATURE: 98 F | RESPIRATION RATE: 18 BRPM | BODY MASS INDEX: 33 KG/M2 | DIASTOLIC BLOOD PRESSURE: 94 MMHG | SYSTOLIC BLOOD PRESSURE: 143 MMHG | WEIGHT: 193.31 LBS | OXYGEN SATURATION: 99 %

## 2021-04-28 PROCEDURE — 80048 BASIC METABOLIC PNL TOTAL CA: CPT | Performed by: INTERNAL MEDICINE

## 2021-04-28 PROCEDURE — 86140 C-REACTIVE PROTEIN: CPT | Performed by: INTERNAL MEDICINE

## 2021-04-28 PROCEDURE — 85652 RBC SED RATE AUTOMATED: CPT | Performed by: INTERNAL MEDICINE

## 2021-04-28 RX ORDER — CEFUROXIME AXETIL 500 MG/1
500 TABLET ORAL 2 TIMES DAILY
Qty: 14 TABLET | Refills: 0 | Status: SHIPPED | OUTPATIENT
Start: 2021-04-28 | End: 2021-05-05

## 2021-04-28 RX ORDER — ONDANSETRON 4 MG/1
4 TABLET, ORALLY DISINTEGRATING ORAL EVERY 4 HOURS PRN
Qty: 20 TABLET | Refills: 0 | Status: SHIPPED | OUTPATIENT
Start: 2021-04-28

## 2021-04-28 NOTE — PLAN OF CARE
A&Ox4. On room air, lungs clear and diminished. Abdomen soft and nontender, BS active. Denies N/V/D. Left face and cheek with swelling and tenderness- Receiving IV Rocephin q24h. C/o pain to left face 4/10- Tylenol given PRN with relief.  Up ad cruz and void

## 2021-04-28 NOTE — PROGRESS NOTES
NURSING DISCHARGE NOTE    Discharged Home via Ambulatory. Accompanied by RN  Belongings Taken by patient/family. Patient given discharge paperwork, verbalized understanding of discharge POC. IV removed.

## 2021-04-28 NOTE — PROGRESS NOTES
[facility]                INFECTIOUS DISEASE PROGRESS NOTE    Mary Lou Montano Patient Status:  Inpatient    1955 MRN [id number]   [facility] 3NE-A Attending Julian Morris DO   Hosp Day # 2 PCP [facility] MD     An --   --  88  --    AST 95*  --   --  33  --    *  --   --  83*  --    BILT 0.76  --   --  0.6  --    TP 7.6  --   --  6.9  --     < > = values in this interval not displayed.          Problem list reviewed:  Patient Active Problem List:     Pure hype

## 2021-04-28 NOTE — DISCHARGE SUMMARY
General Medicine Discharge Summary     Patient ID:  Whitney Landon  63year old  8/16/1955    Admit date: 4/26/2021    Discharge date and time: 4/28/2021    Attending Physician: Zeeshan Flores DO 1,000 mcg by mouth daily. CONTINUE these medications which have NOT CHANGED    Acidophilus/Pectin Oral Cap  Take 1 capsule by mouth daily. ibuprofen 400 MG Oral Tab  Take 400 mg by mouth every 6 (six) hours as needed for Pain.     Ondansetron HCl (Z

## 2021-04-28 NOTE — PLAN OF CARE
Patient A&O x 4. Room air. No tele. Left cheek cellulitis improving. No tenderness, minor swelling. Minor pain this AM, improved with PRN tylenol. Inpt for IV abx, rocephin. Resting comfortably in bed. All needs met at this time, will continue to monitor.

## 2021-08-05 PROCEDURE — 88305 TISSUE EXAM BY PATHOLOGIST: CPT | Performed by: INTERNAL MEDICINE

## 2023-07-21 ENCOUNTER — APPOINTMENT (OUTPATIENT)
Dept: CT IMAGING | Facility: HOSPITAL | Age: 68
DRG: 086 | End: 2023-07-21
Attending: HOSPITALIST
Payer: COMMERCIAL

## 2023-07-21 ENCOUNTER — APPOINTMENT (OUTPATIENT)
Dept: CT IMAGING | Facility: HOSPITAL | Age: 68
DRG: 086 | End: 2023-07-21
Attending: EMERGENCY MEDICINE
Payer: COMMERCIAL

## 2023-07-21 ENCOUNTER — HOSPITAL ENCOUNTER (INPATIENT)
Facility: HOSPITAL | Age: 68
LOS: 2 days | Discharge: HOME | End: 2023-07-23
Attending: EMERGENCY MEDICINE | Admitting: HOSPITALIST
Payer: COMMERCIAL

## 2023-07-21 ENCOUNTER — APPOINTMENT (OUTPATIENT)
Dept: GENERAL RADIOLOGY | Facility: HOSPITAL | Age: 68
End: 2023-07-21
Attending: HOSPITALIST
Payer: COMMERCIAL

## 2023-07-21 ENCOUNTER — APPOINTMENT (OUTPATIENT)
Dept: CT IMAGING | Facility: HOSPITAL | Age: 68
End: 2023-07-21
Attending: EMERGENCY MEDICINE
Payer: COMMERCIAL

## 2023-07-21 ENCOUNTER — HOSPITAL ENCOUNTER (INPATIENT)
Facility: HOSPITAL | Age: 68
LOS: 2 days | Discharge: HOME | DRG: 086 | End: 2023-07-23
Attending: EMERGENCY MEDICINE | Admitting: HOSPITALIST
Payer: COMMERCIAL

## 2023-07-21 ENCOUNTER — APPOINTMENT (OUTPATIENT)
Dept: GENERAL RADIOLOGY | Facility: HOSPITAL | Age: 68
DRG: 086 | End: 2023-07-21
Attending: HOSPITALIST
Payer: COMMERCIAL

## 2023-07-21 ENCOUNTER — APPOINTMENT (OUTPATIENT)
Dept: GENERAL RADIOLOGY | Facility: HOSPITAL | Age: 68
DRG: 086 | End: 2023-07-21
Attending: EMERGENCY MEDICINE
Payer: COMMERCIAL

## 2023-07-21 ENCOUNTER — APPOINTMENT (OUTPATIENT)
Dept: GENERAL RADIOLOGY | Facility: HOSPITAL | Age: 68
End: 2023-07-21
Attending: EMERGENCY MEDICINE
Payer: COMMERCIAL

## 2023-07-21 ENCOUNTER — APPOINTMENT (OUTPATIENT)
Dept: CT IMAGING | Facility: HOSPITAL | Age: 68
End: 2023-07-21
Attending: HOSPITALIST
Payer: COMMERCIAL

## 2023-07-21 ENCOUNTER — APPOINTMENT (OUTPATIENT)
Dept: CV DIAGNOSTICS | Facility: HOSPITAL | Age: 68
End: 2023-07-21
Attending: HOSPITALIST
Payer: COMMERCIAL

## 2023-07-21 ENCOUNTER — APPOINTMENT (OUTPATIENT)
Dept: CV DIAGNOSTICS | Facility: HOSPITAL | Age: 68
DRG: 086 | End: 2023-07-21
Attending: HOSPITALIST
Payer: COMMERCIAL

## 2023-07-21 DIAGNOSIS — S09.90XA INJURY OF HEAD, INITIAL ENCOUNTER: ICD-10-CM

## 2023-07-21 DIAGNOSIS — S06.0XAA CONCUSSION WITH UNKNOWN LOSS OF CONSCIOUSNESS STATUS, INITIAL ENCOUNTER: Primary | ICD-10-CM

## 2023-07-21 DIAGNOSIS — S16.1XXA STRAIN OF NECK MUSCLE, INITIAL ENCOUNTER: ICD-10-CM

## 2023-07-21 PROBLEM — E87.6 HYPOKALEMIA: Status: ACTIVE | Noted: 2023-07-21

## 2023-07-21 PROBLEM — R79.89 AZOTEMIA: Status: ACTIVE | Noted: 2023-07-21

## 2023-07-21 PROBLEM — R73.9 HYPERGLYCEMIA: Status: ACTIVE | Noted: 2023-07-21

## 2023-07-21 PROBLEM — E87.1 HYPONATREMIA: Status: ACTIVE | Noted: 2023-07-21

## 2023-07-21 LAB
ALBUMIN SERPL-MCNC: 4.3 G/DL (ref 3.4–5)
ALBUMIN/GLOB SERPL: 1.6 {RATIO} (ref 1–2)
ALP LIVER SERPL-CCNC: 96 U/L
ALT SERPL-CCNC: 38 U/L
ANION GAP SERPL CALC-SCNC: 9 MMOL/L (ref 0–18)
AST SERPL-CCNC: 27 U/L (ref 15–37)
ATRIAL RATE: 89 BPM
BASOPHILS # BLD AUTO: 0.05 X10(3) UL (ref 0–0.2)
BASOPHILS NFR BLD AUTO: 0.8 %
BILIRUB SERPL-MCNC: 0.6 MG/DL (ref 0.1–2)
BUN BLD-MCNC: 18 MG/DL (ref 7–18)
CALCIUM BLD-MCNC: 9.7 MG/DL (ref 8.5–10.1)
CHLORIDE SERPL-SCNC: 95 MMOL/L (ref 98–112)
CO2 SERPL-SCNC: 26 MMOL/L (ref 21–32)
CREAT BLD-MCNC: 0.87 MG/DL
EGFRCR SERPLBLD CKD-EPI 2021: 74 ML/MIN/1.73M2 (ref 60–?)
EOSINOPHIL # BLD AUTO: 0.17 X10(3) UL (ref 0–0.7)
EOSINOPHIL NFR BLD AUTO: 2.6 %
ERYTHROCYTE [DISTWIDTH] IN BLOOD BY AUTOMATED COUNT: 12 %
GLOBULIN PLAS-MCNC: 2.7 G/DL (ref 2.8–4.4)
GLUCOSE BLD-MCNC: 149 MG/DL (ref 70–99)
HCT VFR BLD AUTO: 38.5 %
HGB BLD-MCNC: 13.2 G/DL
IMM GRANULOCYTES # BLD AUTO: 0.03 X10(3) UL (ref 0–1)
IMM GRANULOCYTES NFR BLD: 0.5 %
LYMPHOCYTES # BLD AUTO: 2.05 X10(3) UL (ref 1–4)
LYMPHOCYTES NFR BLD AUTO: 30.8 %
MCH RBC QN AUTO: 29.5 PG (ref 26–34)
MCHC RBC AUTO-ENTMCNC: 34.3 G/DL (ref 31–37)
MCV RBC AUTO: 85.9 FL
MONOCYTES # BLD AUTO: 0.53 X10(3) UL (ref 0.1–1)
MONOCYTES NFR BLD AUTO: 8 %
NEUTROPHILS # BLD AUTO: 3.83 X10 (3) UL (ref 1.5–7.7)
NEUTROPHILS # BLD AUTO: 3.83 X10(3) UL (ref 1.5–7.7)
NEUTROPHILS NFR BLD AUTO: 57.3 %
OSMOLALITY SERPL CALC.SUM OF ELEC: 275 MOSM/KG (ref 275–295)
P AXIS: 53 DEGREES
P-R INTERVAL: 176 MS
PLATELET # BLD AUTO: 256 10(3)UL (ref 150–450)
POTASSIUM SERPL-SCNC: 3.4 MMOL/L (ref 3.5–5.1)
PROT SERPL-MCNC: 7 G/DL (ref 6.4–8.2)
Q-T INTERVAL: 392 MS
QRS DURATION: 86 MS
QTC CALCULATION (BEZET): 476 MS
R AXIS: 16 DEGREES
RBC # BLD AUTO: 4.48 X10(6)UL
SODIUM SERPL-SCNC: 130 MMOL/L (ref 136–145)
T AXIS: 34 DEGREES
VENTRICULAR RATE: 89 BPM
WBC # BLD AUTO: 6.7 X10(3) UL (ref 4–11)

## 2023-07-21 PROCEDURE — 70450 CT HEAD/BRAIN W/O DYE: CPT | Performed by: EMERGENCY MEDICINE

## 2023-07-21 PROCEDURE — 70486 CT MAXILLOFACIAL W/O DYE: CPT | Performed by: HOSPITALIST

## 2023-07-21 PROCEDURE — 93306 TTE W/DOPPLER COMPLETE: CPT | Performed by: HOSPITALIST

## 2023-07-21 PROCEDURE — 73060 X-RAY EXAM OF HUMERUS: CPT | Performed by: EMERGENCY MEDICINE

## 2023-07-21 PROCEDURE — 70480 CT ORBIT/EAR/FOSSA W/O DYE: CPT | Performed by: EMERGENCY MEDICINE

## 2023-07-21 PROCEDURE — 72125 CT NECK SPINE W/O DYE: CPT | Performed by: EMERGENCY MEDICINE

## 2023-07-21 PROCEDURE — 76377 3D RENDER W/INTRP POSTPROCES: CPT | Performed by: EMERGENCY MEDICINE

## 2023-07-21 PROCEDURE — 73030 X-RAY EXAM OF SHOULDER: CPT | Performed by: HOSPITALIST

## 2023-07-21 RX ORDER — ONDANSETRON 2 MG/ML
INJECTION INTRAMUSCULAR; INTRAVENOUS
Status: DISPENSED
Start: 2023-07-21 | End: 2023-07-21

## 2023-07-21 RX ORDER — ACETAMINOPHEN 500 MG
500 TABLET ORAL EVERY 4 HOURS PRN
Status: DISCONTINUED | OUTPATIENT
Start: 2023-07-21 | End: 2023-07-23

## 2023-07-21 RX ORDER — SODIUM CHLORIDE 9 MG/ML
INJECTION, SOLUTION INTRAVENOUS CONTINUOUS
Status: DISCONTINUED | OUTPATIENT
Start: 2023-07-21 | End: 2023-07-23

## 2023-07-21 RX ORDER — ONDANSETRON 2 MG/ML
4 INJECTION INTRAMUSCULAR; INTRAVENOUS EVERY 6 HOURS PRN
Status: DISCONTINUED | OUTPATIENT
Start: 2023-07-21 | End: 2023-07-23

## 2023-07-21 RX ORDER — ATORVASTATIN CALCIUM 10 MG/1
10 TABLET, FILM COATED ORAL NIGHTLY
Status: DISCONTINUED | OUTPATIENT
Start: 2023-07-21 | End: 2023-07-23

## 2023-07-21 RX ORDER — LOSARTAN POTASSIUM 50 MG/1
50 TABLET ORAL DAILY
Status: DISCONTINUED | OUTPATIENT
Start: 2023-07-22 | End: 2023-07-23

## 2023-07-21 RX ORDER — SODIUM CHLORIDE 9 MG/ML
INJECTION, SOLUTION INTRAVENOUS ONCE
Status: COMPLETED | OUTPATIENT
Start: 2023-07-21 | End: 2023-07-21

## 2023-07-21 RX ORDER — ONDANSETRON 2 MG/ML
4 INJECTION INTRAMUSCULAR; INTRAVENOUS ONCE
Status: COMPLETED | OUTPATIENT
Start: 2023-07-21 | End: 2023-07-21

## 2023-07-21 RX ORDER — MORPHINE SULFATE 4 MG/ML
4 INJECTION, SOLUTION INTRAMUSCULAR; INTRAVENOUS ONCE
Status: COMPLETED | OUTPATIENT
Start: 2023-07-21 | End: 2023-07-21

## 2023-07-21 RX ORDER — PANTOPRAZOLE SODIUM 20 MG/1
20 TABLET, DELAYED RELEASE ORAL EVERY MORNING
Status: DISCONTINUED | OUTPATIENT
Start: 2023-07-21 | End: 2023-07-21

## 2023-07-21 RX ORDER — MONTELUKAST SODIUM 10 MG/1
10 TABLET ORAL NIGHTLY
Status: DISCONTINUED | OUTPATIENT
Start: 2023-07-21 | End: 2023-07-23

## 2023-07-21 RX ORDER — MORPHINE SULFATE 4 MG/ML
4 INJECTION, SOLUTION INTRAMUSCULAR; INTRAVENOUS EVERY 2 HOUR PRN
Status: DISCONTINUED | OUTPATIENT
Start: 2023-07-21 | End: 2023-07-23

## 2023-07-21 RX ORDER — MORPHINE SULFATE 2 MG/ML
2 INJECTION, SOLUTION INTRAMUSCULAR; INTRAVENOUS EVERY 2 HOUR PRN
Status: DISCONTINUED | OUTPATIENT
Start: 2023-07-21 | End: 2023-07-23

## 2023-07-21 RX ORDER — ECHINACEA PURPUREA EXTRACT 125 MG
2 TABLET ORAL
Status: DISCONTINUED | OUTPATIENT
Start: 2023-07-21 | End: 2023-07-23

## 2023-07-21 RX ORDER — MORPHINE SULFATE 2 MG/ML
1 INJECTION, SOLUTION INTRAMUSCULAR; INTRAVENOUS EVERY 2 HOUR PRN
Status: DISCONTINUED | OUTPATIENT
Start: 2023-07-21 | End: 2023-07-23

## 2023-07-21 NOTE — ED QUICK NOTES
Pt lying on cart, appears comfortable. Pt reports no pain if not moving. Pt reports nausea improved but did have an episode of emesis in CT. Pt aware will put in for transport. Per Cristel Riddle, pt did not need to stay for CT results.

## 2023-07-21 NOTE — ED QUICK NOTES
Pt lying on cart with eyes closed, skin w/d,resps reg/unlabored. C-collar in place. Pt appears drowsy but speech clear. Pt does not recall events regarding fall but is oriented x 3. Large amount of dried blood noted to face with brusing to left forehead. Pt unable to open left eye. Per spouse, pt did have  a fall in February when she landed on the left side of her head, had bruising and was evaluated by her physician.

## 2023-07-21 NOTE — ED QUICK NOTES
Pt returned from [address] [place] [address] and PCT. Pt currently answering more appropriately. Casi Oliva

## 2023-07-21 NOTE — ED QUICK NOTES
Orders for admission, patient is aware of plan and ready to go upstairs. Any questions, please call ED CHAN rey  at extension [phone]. Vaccinated? Type of COVID test sent:none  COVID Suspicion level: Low/High low      Titratable drug(s) infusing:  Rate:pt received 1 liter of saline    LOC at time of transport:  Oriented x 3 but confused to events surrounding fall  Other pertinent information: pt had unwitnessed fall, fell down approx 6 stairs. Pt had CT of head and neck, possible orbit fx. Pt going to CT of orbit prior to floor. Pt with pain to neck and left upper arm which had xray done. Awaiting results. Pt oriented and answering questions but repetetive with complaints of neck pain.      CIWA score=  NIH score=

## 2023-07-21 NOTE — ED INITIAL ASSESSMENT (HPI)
Pt arrives to ed via ems w c/o of a fall.  reports waking up to a noise and finding pt at the end of the stairs. Pt reports about 5 stairs leading down and found pt in a pool of blood. Pt denies remembering the fall. No blood thinners.

## 2023-07-21 NOTE — ED QUICK NOTES
C-collar removed by Dr. Gregory Pedraza, pt c/o pain to neck. Pt noted to have bruising near right knee. Pt c/o pain to left upper arm/shoulder with attempting to lift during neuro exam.    Towel roll placed under neck for support and pt reports improvement in neck pain but would like something for pain.

## 2023-07-21 NOTE — PROGRESS NOTES
Imaging reviewed. CT facial bone demonstrated a non-displaced left posterior arch of C1 fracture. This represents a mechanically stable injury that does not require Neuro spinal intervention. C-collar would be strictly for comfort if patient having pain with movement. Management entails symptoms palliation. Full note to follow.     [id number] [facility]  Neurosurgery

## 2023-07-21 NOTE — PROGRESS NOTES
07/21/23 1219   Clinical Encounter Type   Visited With Health care provider      Upon inquiry, identified that patient may not be able to make decisions related to POA at this time. Reviewed the patient's EMR and paper chart. There is no documented POA for Healthcare on either. Nurse requested follow up tomorrow - family would like to began with POA paperwork.  will place POA on chap log for follow up.      [address]

## 2023-07-22 PROBLEM — S12.031A CLOSED NONDISPLACED POSTERIOR ARCH FRACTURE OF FIRST CERVICAL VERTEBRA (HCC): Status: ACTIVE | Noted: 2023-07-22

## 2023-07-22 LAB
ANION GAP SERPL CALC-SCNC: 6 MMOL/L (ref 0–18)
BUN BLD-MCNC: 10 MG/DL (ref 7–18)
CALCIUM BLD-MCNC: 9.1 MG/DL (ref 8.5–10.1)
CHLORIDE SERPL-SCNC: 100 MMOL/L (ref 98–112)
CO2 SERPL-SCNC: 25 MMOL/L (ref 21–32)
CREAT BLD-MCNC: 0.62 MG/DL
EGFRCR SERPLBLD CKD-EPI 2021: 99 ML/MIN/1.73M2 (ref 60–?)
ERYTHROCYTE [DISTWIDTH] IN BLOOD BY AUTOMATED COUNT: 12.3 %
GLUCOSE BLD-MCNC: 102 MG/DL (ref 70–99)
HCT VFR BLD AUTO: 35.7 %
HGB BLD-MCNC: 12.2 G/DL
MAGNESIUM SERPL-MCNC: 2 MG/DL (ref 1.6–2.6)
MCH RBC QN AUTO: 29.5 PG (ref 26–34)
MCHC RBC AUTO-ENTMCNC: 34.2 G/DL (ref 31–37)
MCV RBC AUTO: 86.2 FL
OSMOLALITY SERPL CALC.SUM OF ELEC: 271 MOSM/KG (ref 275–295)
PLATELET # BLD AUTO: 203 10(3)UL (ref 150–450)
POTASSIUM SERPL-SCNC: 4.3 MMOL/L (ref 3.5–5.1)
POTASSIUM SERPL-SCNC: 4.3 MMOL/L (ref 3.5–5.1)
RBC # BLD AUTO: 4.14 X10(6)UL
SODIUM SERPL-SCNC: 131 MMOL/L (ref 136–145)
WBC # BLD AUTO: 9 X10(3) UL (ref 4–11)

## 2023-07-22 PROCEDURE — 99222 1ST HOSP IP/OBS MODERATE 55: CPT | Performed by: STUDENT IN AN ORGANIZED HEALTH CARE EDUCATION/TRAINING PROGRAM

## 2023-07-22 PROCEDURE — 99255 IP/OBS CONSLTJ NEW/EST HI 80: CPT | Performed by: OTHER

## 2023-07-22 RX ORDER — ONDANSETRON 4 MG/1
4 TABLET, FILM COATED ORAL EVERY 8 HOURS PRN
Qty: 30 TABLET | Refills: 0 | Status: SHIPPED | OUTPATIENT
Start: 2023-07-22

## 2023-07-22 RX ORDER — HYDROCODONE BITARTRATE AND ACETAMINOPHEN 5; 325 MG/1; MG/1
1 TABLET ORAL EVERY 6 HOURS PRN
Qty: 30 TABLET | Refills: 0 | Status: SHIPPED | OUTPATIENT
Start: 2023-07-22

## 2023-07-22 RX ORDER — HYDROCODONE BITARTRATE AND ACETAMINOPHEN 5; 325 MG/1; MG/1
1 TABLET ORAL EVERY 6 HOURS PRN
Status: DISCONTINUED | OUTPATIENT
Start: 2023-07-22 | End: 2023-07-23

## 2023-07-22 RX ORDER — POLYETHYLENE GLYCOL 3350 17 G/17G
17 POWDER, FOR SOLUTION ORAL DAILY PRN
Qty: 30 EACH | Refills: 0 | Status: SHIPPED | OUTPATIENT
Start: 2023-07-22

## 2023-07-22 NOTE — PLAN OF CARE
Assumed care at 0730   at bedside. A/O x4. Much more oriented today. Still groggy. RA  NSR on tele   VSS  Complains of 8-9/10 neck pain with movement. Ice packs applied to posterior neck and Norco given per MAR. Refusing nasal spray  Non-cardiac electrolyte protocol. No needs. 0.9 @ 83 ml/hr  Regular diet. Bedside RN swallow eval completed with no evidence of aspiration. Tolerating diet well. Up 1 assist with walker to bathroom. PT eval rec home with supervision and PT. Pt and pt's  updated on poc. Will continue with plan of care. Waiting for formal neuro consult, but could potentially discharge this afternoon. 1700: pt will stay tonight until less drowsy/ more steady on feet. MD Dr. Neli Moreno ok with patient staying tonight.      Problem: HEMATOLOGIC - ADULT  Goal: Maintains hematologic stability  Description: INTERVENTIONS  - Assess for signs and symptoms of bleeding or hemorrhage  - Monitor labs and vital signs for trends  - Administer supportive blood products/factors, fluids and medications as ordered and appropriate  - Administer supportive blood products/factors as ordered and appropriate  Outcome: Progressing  Goal: Free from bleeding injury  Description: (Example usage: patient with low platelets)  INTERVENTIONS:  - Avoid intramuscular injections, enemas and rectal medication administration  - Ensure safe mobilization of patient  - Hold pressure on venipuncture sites to achieve adequate hemostasis  - Assess for signs and symptoms of internal bleeding  - Monitor lab trends  - Patient is to report abnormal signs of bleeding to staff  - Avoid use of toothpicks and dental floss  - Use electric shaver for shaving  - Use soft bristle tooth brush  - Limit straining and forceful nose blowing  Outcome: Progressing     Problem: Impaired Functional Mobility  Goal: Achieve highest/safest level of mobility/gait  Description: Interventions:  - Assess patient's functional ability and stability  - Promote increasing activity/tolerance for mobility and gait  - Educate and engage patient/family in tolerated activity level and precautions  - Recommend use of  RW for transfers and ambulation  Outcome: Progressing     Problem: PAIN - ADULT  Goal: Verbalizes/displays adequate comfort level or patient's stated pain goal  Description: INTERVENTIONS:  - Encourage pt to monitor pain and request assistance  - Assess pain using appropriate pain scale  - Administer analgesics based on type and severity of pain and evaluate response  - Implement non-pharmacological measures as appropriate and evaluate response  - Consider cultural and social influences on pain and pain management  - Manage/alleviate anxiety  - Utilize distraction and/or relaxation techniques  - Monitor for opioid side effects  - Notify MD/LIP if interventions unsuccessful or patient reports new pain  - Anticipate increased pain with activity and pre-medicate as appropriate  Outcome: Progressing

## 2023-07-22 NOTE — CONSULTS
[facility]  Neurological Surgery Consult Note    Ana Montano  [id number]  [id number]  PCP: Janey Carrasco MD  Consulting Provider: Camron Pitts DO    REASON FOR CONSULT:  C1 arch fracture    HISTORY OF PRESENT ILLNESS:  Ana Montano is a(n) 74year old female with HTN, HLD, GERD who was admitted s/p fall. History obtained from spouse at bedside as patient has no recollection of events. Per , the morning of  around 4 am he heard a noise and found her at the bottom of the stairs with blood all around her. She has been disoriented and complaining of neck pain since the fall. She has significant left sided periorbital edema. Trauma imaging was negative for intracranial injuries but did reveal a tiny non-displaced partial fracture of the left posterior arch of C1. Neurosurgery was consulted. PAST MEDICAL HISTORY:  Past Medical History:   Diagnosis Date    Allergic rhinitis due to pollen     Carcinoma in situ of cervix uteri 1986    Esophageal reflux     High blood pressure     High cholesterol     Menopause     Osteoarthritis     generalized    Pure hypercholesterolemia     Rosacea     Tubular adenoma of colon 2012    Visual impairment     glasses     PAST SURGICAL HISTORY:  Past Surgical History:   Procedure Laterality Date              COLONOSCOPY N/A 2021    Procedure: COLONOSCOPY with polypectomy;  Surgeon: Jessenia Gregorio MD;  Location: [place] [address]  2012    Procedure: COLONOSCOPY, POSSIBLE BIOPSY, POSSIBLE POLYPECTOMY 25532;  Surgeon: Jessenia Gregorio MD;  Location: [facility] N/A 2016    Procedure: COLONOSCOPY, POSSIBLE BIOPSY, POSSIBLE POLYPECTOMY 89680;  Surgeon:  Jessenia Gregorio MD;  Location: [address] [place] [address]    CT HEART W/ CALCIUM SCORING  2009    calcium score of 0    CT HEART W/ CALCIUM SCORING N/A 2019    calcium score is zero    IMPACT TOOTH REM BONY W/COMP Bilateral 1973    wisdom teeth    KNEE REPLACEMENT SURGERY Right 02/21/2017    right    LASER SURGERY OF CERVIX  1/1986    PERIPHERAL VASCULAR SCREENING HISTORICAL CONV Bilateral 8/1/2016    PAD screen is normal    TOTAL KNEE REPLACEMENT Right 02/22/2017     FAMILY HISTORY:  family history includes Alcohol and Other Disorders Associated in her maternal grandfather; Arthritis in her sister; Breast Cancer in her maternal great-grandmother; Breast Cancer (age of onset: 64) in her sister; Cancer in her paternal grandfather; Cancer (age of onset: 76) in her mother; Cataracts in her mother; Clotting Disorder in her sister; Dementia in her paternal grandmother; Diabetes in her father, paternal grandfather, and sister; Ear Problems in her father; Eye Problems in her maternal grandmother; Heart Disease in her father and maternal grandmother; Heart Disorder in her father and paternal grandfather; Heart Surgery (age of onset: 54) in her father; High Cholesterol in her maternal grandmother and sister; Hypertension in her father, mother, and sister; Kidney Disease in her sister; Lipids in her maternal grandmother; Musculo-skelatal Disorder in her maternal grandmother; Neurological Disorder in her paternal grandmother; Obesity in her sister; Other in her sister; Pulmonary Disease in her maternal grandmother. SOCIAL HISTORY:   reports that she quit smoking about 40 years ago. Her smoking use included cigarettes. She has a 6.00 pack-year smoking history. She has never used smokeless tobacco. She reports current alcohol use of about 5.8 standard drinks of alcohol per week. She reports that she does not use drugs. ALLERGIES:    Amoxicillin-Pot Cla*    RASH    MEDICATIONS:  No current facility-administered medications on file prior to encounter.   montelukast 10 MG Oral Tab, Take 1 tablet (10 mg total) by mouth nightly., Disp: 90 tablet, Rfl: 3  losartan-hydroCHLOROthiazide 50-12.5 MG Oral Tab, Take 1 tablet by mouth daily. , Disp: 90 tablet, Rfl: 3  atorvastatin 10 MG Oral Tab, TAKE 1 TABLET(10 MG) BY MOUTH EVERY DAY, Disp: 90 tablet, Rfl: 3  Meloxicam 15 MG Oral Tab, TAKE 1 TABLET(15 MG) BY MOUTH DAILY, Disp: 90 tablet, Rfl: 3  Levocetirizine Dihydrochloride 5 MG Oral Tab, Take 1 tablet (5 mg total) by mouth every evening., Disp: , Rfl:   Cyanocobalamin (VITAMIN B 12) 100 MCG Oral Lozenge, Take 1,000 mcg by mouth daily. , Disp: 30 lozenge, Rfl: 0  Vitamin D3 2000 units Oral Cap, Take 2 capsules (4,000 Units total) by mouth daily. , Disp: 180 capsule, Rfl: 3  cetirizine 10 MG Oral Tab, Take 1 tablet (10 mg total) by mouth daily. , Disp: , Rfl:   Omeprazole Magnesium 20 MG Oral Tab EC, Take 1 tablet (20 mg total) by mouth daily. , Disp: 28 tablet, Rfl: 0  losartan 50 MG Oral Tab, Take 1 tablet (50 mg total) by mouth daily. (Patient not taking: Reported on 7/21/2023), Disp: 90 tablet, Rfl: 0  hydroCHLOROthiazide 12.5 MG Oral Cap, Take 1 capsule (12.5 mg total) by mouth daily. (Patient not taking: Reported on 7/21/2023), Disp: 90 capsule, Rfl: 0  Fluticasone Propionate 50 MCG/ACT Nasal Suspension, by Each Nare route daily. , Disp: , Rfl:   ondansetron 4 MG Oral Tablet Dispersible, Take 1 tablet (4 mg total) by mouth every 4 (four) hours as needed for Nausea. (Patient not taking: Reported on 7/21/2023), Disp: 20 tablet, Rfl: 0  ibuprofen 400 MG Oral Tab, Take 1 tablet (400 mg total) by mouth every 6 (six) hours as needed for Pain., Disp: , Rfl:   Ondansetron HCl (ZOFRAN) 4 mg tablet, Take 1 tablet (4 mg total) by mouth every 8 (eight) hours as needed for Nausea. (Patient not taking: Reported on 7/21/2023), Disp: 8 tablet, Rfl: 0  melatonin 3 MG Oral Tab, Take 6 mg by mouth nightly as needed.  (Patient not taking: Reported on 7/21/2023), Disp: , Rfl:       REVIEW OF SYSTEMS:  All other systems were reviewed and were negative except for those previously mentioned in the HPI    PHYSICAL EXAMINATION:  General: No acute distress. Respiratory: Non-labored respirations bilaterally. No audible wheezing  Cardiovascular: Extremities warm and well-perfused. Abdomen: Soft, nontender, nondistended. Musculoskeletal: Moves all extremities well, symmetrically. Extremities: No edema. Neurological:   A&Ox3   PERRL, EOMI   Face symmetric, no numbness   5/5 throughout, no drift   Sensation intact to light touch    IMAGING:  CT head: No acute intracranial process. No new hemorrhage or hydrocephalus. No skull fractures. CT c-spine: Tiny hair line non-displaced partial fracture through the posterior arch of C1. No acute subluxations. No overhang. This is a mechanically stable fracture. Chronic degenerative changes throughout the spine. ASSESSMENT:  74 yoF s/p fall resulting in a mechanically stable fracture of the posterior arch of C1. No complicated radiographic features. Neurologically intact. No need for c-collar or restrictions. Expectant management with symptom relief. Discussed red flags with family and patient. No follow up necessary unless any red flags arise. Plan:  - Neurosurgery to follow from a distance, will remain available upon request  - No need for c-collar  - No restrictions  - No neurospinal follow up necessary    Odessa Martinez MD  Neurological Surgery    [facility] [address] [place] [address]  [phone]  Pager [phone]  7/22/2023 9:17 AM      This note was created using a voice-recognition transcribing system. Incorrect words or phrases may have been missed during proofreading. Please interpret accordingly.

## 2023-07-22 NOTE — PHYSICAL THERAPY NOTE
PHYSICAL THERAPY TREATMENT NOTE - INPATIENT    Room Number: 5404/2811-I     Session: 1          Presenting Problem: Concussion, non displaced/stable C1 fracture  Co-Morbidities : htn, hld, gerd, OA L knee      History related to current admission: Patient is a 74year old female admitted on 7/21/2023 from home for fall, potentially down stairs. Pt w/ no recollection of how she fell. Pt diagnosed with concussion, non displaced/stable C1 fracture and L orbital wall fracture. Per neurosurgery - no intervention required, no need for collar other than for comfort. 7/21 CT of facial bones -per chart    CONCLUSION:       1. Acute distracted fracture involving the right anterior arch of C1 and a nondisplaced fracture involving the posterior left arch of C1. Critical findings were discussed with for Argenis Contreras the nurse on the floor at 1435 hours on 7/21/2023. Consultation    with spine surgery would be recommended. 2. Tiny nondisplaced fracture involving the left lamina papyracea with small amount a gas within the left orbit. ASSESSMENT     Patient continues to have pain with any minimal movement and at rest. Cold pack applied and use of proper body mechanics discussed. RW and gait belt was provided for home use. Pt is at cga to sup level during gait with RW. Pt was able to navigate stairs with one rail and cga. Pt needs min assist for bed mobility due to pain. L eye swelling, dec visual field, recommend 24 hr family assist and home with OPPT. DISCHARGE RECOMMENDATIONS  PT Discharge Recommendations: Home;Outpatient PT     PLAN  PT Treatment Plan: Bed mobility; Body mechanics; Patient education; Family education;Gait training;Strengthening;Stair training;Transfer training;Balance training  Rehab Potential : Good  Frequency (Obs): 3-5x/week    CURRENT GOALS     Goal #1 Patient is able to demonstrate supine - sit EOB @ level: modified independent     Goal #2 Patient is able to demonstrate transfers EOB to/from [facility] at assistance level: modified independent     Goal #3 Patient is able to ambulate 100 feet with assist device: walker - rolling at assistance level: supervision  Met      Goal #4 Up and down 6-7 steps w/ use of railing and supervision assist.   Goal #5    Goal #6    Goal Comments: Goals established on 2023 all goals ongoing and see progress above. SUBJECTIVE  \" My neck hurts. \"    OBJECTIVE  Precautions: None (soft collar for comfort)    WEIGHT BEARING RESTRICTION  Weight Bearing Restriction: None                PAIN ASSESSMENT   Ratin  Location: Neck mostly, some headache pain as well  Management Techniques: Activity promotion; Body mechanics;Repositioning    BALANCE                                                                                                                       Static Sitting: Fair  Dynamic Sitting: Fair -           Static Standing: Fair -  Dynamic Standing: Fair -    ACTIVITY TOLERANCE                         O2 WALK         AM-PAC '6-Clicks' INPATIENT SHORT FORM - BASIC MOBILITY  How much difficulty does the patient currently have. .. Patient Difficulty: Turning over in bed (including adjusting bedclothes, sheets and blankets)?: A Little   Patient Difficulty: Sitting down on and standing up from a chair with arms (e.g., wheelchair, bedside commode, etc.): A Little   Patient Difficulty: Moving from lying on back to sitting on the side of the bed?: A Little   How much help from another person does the patient currently need. ..    Help from Another: Moving to and from a bed to a chair (including a wheelchair)?: A Little   Help from Another: Need to walk in hospital room?: A Little   Help from Another: Climbing 3-5 steps with a railing?: A Little       AM-PAC Score:  Raw Score: 18   Approx Degree of Impairment: 46.58%   Standardized Score (AM-PAC Scale): 43.63   CMS Modifier (G-Code): CK    FUNCTIONAL ABILITY STATUS  Gait Assessment   Functional Mobility/Gait Assessment  Gait Assistance: Contact guard assist  Distance (ft): 120  Assistive Device: Rolling walker  Pattern: Within Functional Limits  Stairs: Stairs  How Many Stairs: 12  Device: 1 Rail  Assist: Contact guard assist    Skilled Therapy Provided    Bed Mobility:  Rolling: unable to roll without assist and great discomfort. Supine<>Sit: min assist   Sit<>Supine: min assist     Transfer Mobility:  Sit<>Stand: sup   Stand<>Sit: sup   Gait: cga for 20' and sup for 100' with RW. Therapist's Comments: Patient was received in sitting. Role of therapy and plan of session discussed. Patient alert and oriented. Unable to open left eye fully due to swelling and bruising. During bed mobility training, cues for body mechanics, proper hand support on rail and assist required. During transfer training, cues and assist for scooting properly, hand placement and LE placement needed. During gait training, patient was given cues for proper RW management, body mechanics when using a device. Discussed home safety, removal of all area rugs; night light recommended; recommended  provide cga;  requested belt, provided. Rw was provided for home use, adjusted for proper height. Cold pack and towel roll placed under the neck at the end of session. THERAPEUTIC EXERCISES  Lower Extremity Ankle pumps  LAQ       Patient End of Session: In bed;Needs met;Call light within reach;RN aware of session/findings; All patient questions and concerns addressed; Alarm set; Ice applied; Family present    PT Session Time: 38 minutes  Gait Training: 15 minutes  Therapeutic Activity: 20 minutes  Therapeutic Exercise: 3 minutes   Neuromuscular Re-education: 0 minutes

## 2023-07-22 NOTE — PROGRESS NOTES
07/22/23 8098   Clinical Encounter Type   Visited With Patient not available  (Patient was sleeping.)   Continue Visiting Yes

## 2023-07-22 NOTE — PLAN OF CARE
Problem: PAIN - ADULT  Goal: Verbalizes/displays adequate comfort level or patient's stated pain goal  Description: INTERVENTIONS:  - Encourage pt to monitor pain and request assistance  - Assess pain using appropriate pain scale  - Administer analgesics based on type and severity of pain and evaluate response  - Implement non-pharmacological measures as appropriate and evaluate response  - Consider cultural and social influences on pain and pain management  - Manage/alleviate anxiety  - Utilize distraction and/or relaxation techniques  - Monitor for opioid side effects  - Notify MD/LIP if interventions unsuccessful or patient reports new pain  - Anticipate increased pain with activity and pre-medicate as appropriate  Outcome: Progressing     Assume care @ 1930  Patient AOX3, forgetful, RA, Tele-SR and VSS  IVF infusing @ 83 ml/hr  Strict NPO, Morphine PRN given  Non-cardiac electrolyte protocol

## 2023-07-22 NOTE — PLAN OF CARE
Problem: HEMATOLOGIC - ADULT  Goal: Maintains hematologic stability  Description: INTERVENTIONS  - Assess for signs and symptoms of bleeding or hemorrhage  - Monitor labs and vital signs for trends  - Administer supportive blood products/factors, fluids and medications as ordered and appropriate  - Administer supportive blood products/factors as ordered and appropriate  Outcome: Progressing  Goal: Free from bleeding injury  Description: (Example usage: patient with low platelets)  INTERVENTIONS:  - Avoid intramuscular injections, enemas and rectal medication administration  - Ensure safe mobilization of patient  - Hold pressure on venipuncture sites to achieve adequate hemostasis  - Assess for signs and symptoms of internal bleeding  - Monitor lab trends  - Patient is to report abnormal signs of bleeding to staff  - Avoid use of toothpicks and dental floss  - Use electric shaver for shaving  - Use soft bristle tooth brush  - Limit straining and forceful nose blowing  Outcome: Progressing     Problem: Impaired Functional Mobility  Goal: Achieve highest/safest level of mobility/gait  Description: Interventions:  - Assess patient's functional ability and stability  - Promote increasing activity/tolerance for mobility and gait  - Educate and engage patient/family in tolerated activity level and precautions  - Recommend use of  RW for transfers and ambulation  - Recommend use of chair position in bed 3 times per day  Outcome: Progressing

## 2023-07-22 NOTE — PHYSICAL THERAPY NOTE
PHYSICAL THERAPY EVALUATION - INPATIENT     Room Number: 2859/0784-Z  Evaluation Date: 7/22/2023  Type of Evaluation: Initial  Physician Order: PT Eval and Treat    Presenting Problem: Concussion, non displaced/stable C1 fracture  Co-Morbidities : htn, hld, gerd, OA L knee  Reason for Therapy: Mobility Dysfunction and Discharge Planning    History related to current admission: Patient is a 74year old female admitted on 7/21/2023 from home for fall, potentially down stairs. Pt w/ no recollection of how she fell. Pt diagnosed with concussion, non displaced/stable C1 fracture and L orbital wall fracture. Per neurosurgery - no intervention required, no need for collar other than for comfort. 7/21 CT of facial bones -per chart    CONCLUSION:       1. Acute distracted fracture involving the right anterior arch of C1 and a nondisplaced fracture involving the posterior left arch of C1. Critical findings were discussed with for Marisa Medeiros the nurse on the floor at 1435 hours on 7/21/2023. Consultation    with spine surgery would be recommended. 2. Tiny nondisplaced fracture involving the left lamina papyracea with small amount a gas within the left orbit. ASSESSMENT   In this PT evaluation, the patient presents with the following impairments neck/head pain, decreased balance, self-imposed decreased rom in neck due to pain, facial edema w/ L eye swollen shut. These impairments and comorbidities manifest themselves as functional limitations in independent bed mobility, transfers, and gait. The patient is below baseline and would benefit from skilled inpatient PT to address the above deficits to assist patient in returning to prior to level of function. Functional outcome measures completed include [facility]. The AM-PAC '6-Clicks' Inpatient Basic Mobility Short Form was completed and this patient is demonstrating a Approx Degree of Impairment: 41.77%  degree of impairment in mobility.  Research supports that patients with this level of impairment may benefit from home w/ home PT, but feel pt will make more significant progress w/ outpatient services at this time. DISCHARGE RECOMMENDATIONS  PT Discharge Recommendations: Home;Outpatient PT    PLAN  PT Treatment Plan: Bed mobility; Body mechanics; Patient education; Family education;Gait training;Strengthening;Stair training;Transfer training;Balance training  Rehab Potential : Good  Frequency (Obs): 3-5x/week         CURRENT GOALS    Goal #1 Patient is able to demonstrate supine - sit EOB @ level: modified independent     Goal #2 Patient is able to demonstrate transfers EOB to/from [facility] at assistance level: modified independent     Goal #3 Patient is able to ambulate 100 feet with assist device: walker - rolling at assistance level: supervision     Goal #4 Up and down 6-7 steps w/ use of railing and supervision assist.   Goal #5    Goal #6    Goal Comments: Goals established on 2023    HOME SITUATION  Type of Home: House   Home Layout: Multi-level (split level)  Stairs to Enter : 1  Railing: No  Stairs to Bedroom: 6  Railing: Yes    Lives With: Spouse  Drives: Yes  Patient Owned Equipment: Rolling walker       Prior Level of [place]: Pt is typically independent w/ adl's, gait w/o device, drives and currently is not employed. Pt has had 2 falls in last 6 months. Per family she has a tendency to be \"clumsy\" and trip on things. SUBJECTIVE  \"It feels good to be out of bed. \"      OBJECTIVE  Precautions: None (soft collar for comfort)  Fall Risk: High fall risk    WEIGHT BEARING RESTRICTION  Weight Bearing Restriction: None                PAIN ASSESSMENT  Ratin  Location: Neck mostly, some headache pain as well  Management Techniques: Activity promotion; Body mechanics;Repositioning    COGNITION  Overall Cognitive Status:  WFL - within functional limits    RANGE OF MOTION AND STRENGTH ASSESSMENT  Upper extremity ROM and strength -see OT evaluation    Lower extremity ROM is within functional limits     Lower extremity strength is within functional limits - grossly 4/5 throughout      BALANCE  Static Sitting: Fair  Dynamic Sitting: Fair -  Static Standing: Fair -  Dynamic Standing: Fair -    ADDITIONAL TESTS                                    ACTIVITY TOLERANCE                         O2 WALK       NEUROLOGICAL FINDINGS                        AM-PAC '6-Clicks' INPATIENT SHORT FORM - BASIC MOBILITY  How much difficulty does the patient currently have. .. Patient Difficulty: Turning over in bed (including adjusting bedclothes, sheets and blankets)?: None   Patient Difficulty: Sitting down on and standing up from a chair with arms (e.g., wheelchair, bedside commode, etc.): A Little   Patient Difficulty: Moving from lying on back to sitting on the side of the bed?: A Little   How much help from another person does the patient currently need. .. Help from Another: Moving to and from a bed to a chair (including a wheelchair)?: A Little   Help from Another: Need to walk in hospital room?: A Little   Help from Another: Climbing 3-5 steps with a railing?: A Little       AM-PAC Score:  Raw Score: 19   Approx Degree of Impairment: 41.77%   Standardized Score (AM-PAC Scale): 45.44   CMS Modifier (G-Code): CK    FUNCTIONAL ABILITY STATUS  Gait Assessment   Functional Mobility/Gait Assessment  Gait Assistance: Contact guard assist  Distance (ft): 35  Assistive Device: Rolling walker  Pattern: Shuffle    Skilled Therapy Provided     Bed Mobility:  Rolling: Instructed pt in log rolling technique - completed to the right w/ mod I  Supine to sit: Pt did need physical assist for trunk while following log rolling technique  - min a of 1. Sit to supine:  NT     Transfer Mobility:  Sit to stand: Cues for proper hand placement, completed 2x during course of session. CGA both from eob and from toilet.    Stand to sit: Cues for safety technique - CGA  Gait = Completed gait 35'x1 w/ rolling walker and cga. Noted brief passing dizziness (could not obtain a bp at that time). Pt steady w/ use of rolling walker. Therapist's Comments: Discussed d/c recommendations, including outpatient PT for higher level balance training and pain control for neck. Pt and family in agreement. Exercise/Education Provided:  Bed mobility  Functional activity tolerated  Gait training  Transfer training    Patient End of Session: Up in chair;Needs met;Call light within reach;RN aware of session/findings; All patient questions and concerns addressed; Family present (Emily Garcia aware of eval session)      Patient Evaluation Complexity Level:  History Moderate - 1 or 2 personal factors and/or co-morbidities   Examination of body systems Moderate - addressing a total of 3 or more elements   Clinical Presentation Moderate - Evolving   Clinical Decision Making Moderate - Evolving       PT Session Time: 35 minutes  Gait Training: 15 minutes  Therapeutic Activity: 10 minutes  Neuromuscular Re-education: 0 minutes  Therapeutic Exercise: 0 minutes

## 2023-07-22 NOTE — PROGRESS NOTES
NURSING ADMISSION NOTE      Patient admitted via Cart  Oriented to room. Safety precautions initiated. Bed in low position. Call light in reach. Assumed care at 1130  A/O x4 - lethargic. Confused at times. Easy to arouse. Patient sleeping most of the day. NSR on tele. VSS. Admission navigator complete with  at bedside. CT facial bones shows C1 fracture. Neurosurg consulted. No need for c collar or intervention at this time. ENT and neurology consulted  Significant bruising to L side of face and forehead  Significant bruise to R knee  Scattered bruising on arms hands and legs  No VTE d/t risk of bleeding  0.9 @ 83 ml / hr  Strict NPO  Non-cardiac electrolyte protocol - K replaced via IV today  Complains of 8-9/10 neck pain. Also complains of L arm / shoulder pain. Morphine given per [facility] with some relief. Ice packs applied to neck. Echo completed today  Glasses at bedside  [address] Bedrest for now. PT/OT eval placed  All needs met. Pt and pt's , colette, updated on poc. Will continue with plan of care.

## 2023-07-23 VITALS
HEART RATE: 73 BPM | SYSTOLIC BLOOD PRESSURE: 144 MMHG | OXYGEN SATURATION: 97 % | DIASTOLIC BLOOD PRESSURE: 82 MMHG | BODY MASS INDEX: 33 KG/M2 | WEIGHT: 187.88 LBS | TEMPERATURE: 98 F | RESPIRATION RATE: 17 BRPM

## 2023-07-23 LAB
ANION GAP SERPL CALC-SCNC: 5 MMOL/L (ref 0–18)
BUN BLD-MCNC: 9 MG/DL (ref 7–18)
CALCIUM BLD-MCNC: 9.1 MG/DL (ref 8.5–10.1)
CHLORIDE SERPL-SCNC: 101 MMOL/L (ref 98–112)
CO2 SERPL-SCNC: 28 MMOL/L (ref 21–32)
CREAT BLD-MCNC: 0.55 MG/DL
EGFRCR SERPLBLD CKD-EPI 2021: 102 ML/MIN/1.73M2 (ref 60–?)
ERYTHROCYTE [DISTWIDTH] IN BLOOD BY AUTOMATED COUNT: 12.4 %
GLUCOSE BLD-MCNC: 108 MG/DL (ref 70–99)
HCT VFR BLD AUTO: 33 %
HGB BLD-MCNC: 11.3 G/DL
MAGNESIUM SERPL-MCNC: 1.9 MG/DL (ref 1.6–2.6)
MCH RBC QN AUTO: 29.5 PG (ref 26–34)
MCHC RBC AUTO-ENTMCNC: 34.2 G/DL (ref 31–37)
MCV RBC AUTO: 86.2 FL
OSMOLALITY SERPL CALC.SUM OF ELEC: 277 MOSM/KG (ref 275–295)
PLATELET # BLD AUTO: 242 10(3)UL (ref 150–450)
POTASSIUM SERPL-SCNC: 3.7 MMOL/L (ref 3.5–5.1)
RBC # BLD AUTO: 3.83 X10(6)UL
SODIUM SERPL-SCNC: 134 MMOL/L (ref 136–145)
WBC # BLD AUTO: 7.2 X10(3) UL (ref 4–11)

## 2023-07-23 NOTE — PROGRESS NOTES
NURSING DISCHARGE NOTE    Discharged Home via Wheelchair. Accompanied by RN and Spouse  Belongings Taken by patient/family. PIV removed  AVS printed and reviewed with patient and patient's   Paper prescription for norco signed by Dr. Archie Tian, and paper prescription for zofran sent with patient  Patient educated on need for follow-up appointments  All questions answered. Education material provided about concussion and cervical spine fracture for patient to review. Patient wheeled out by RN and  to be driven home by .

## 2023-07-23 NOTE — PLAN OF CARE
Pt. A&O x4, drowsy, on RA, NSR on tele. VSS. Up with standby/walker. C/o neck pain, ice packs and PRN pain medication given. 0.9 infusing at 83/hr. Fall and safety precautions in place. Will continue to monitor.      Problem: HEMATOLOGIC - ADULT  Goal: Maintains hematologic stability  Description: INTERVENTIONS  - Assess for signs and symptoms of bleeding or hemorrhage  - Monitor labs and vital signs for trends  - Administer supportive blood products/factors, fluids and medications as ordered and appropriate  - Administer supportive blood products/factors as ordered and appropriate  Outcome: Progressing  Goal: Free from bleeding injury  Description: (Example usage: patient with low platelets)  INTERVENTIONS:  - Avoid intramuscular injections, enemas and rectal medication administration  - Ensure safe mobilization of patient  - Hold pressure on venipuncture sites to achieve adequate hemostasis  - Assess for signs and symptoms of internal bleeding  - Monitor lab trends  - Patient is to report abnormal signs of bleeding to staff  - Avoid use of toothpicks and dental floss  - Use electric shaver for shaving  - Use soft bristle tooth brush  - Limit straining and forceful nose blowing  Outcome: Progressing     Problem: Impaired Functional Mobility  Goal: Achieve highest/safest level of mobility/gait  Description: Interventions:  - Assess patient's functional ability and stability  - Promote increasing activity/tolerance for mobility and gait  - Educate and engage patient/family in tolerated activity level and precautions  - Recommend use of  RW for transfers and ambulation  Outcome: Progressing     Problem: PAIN - ADULT  Goal: Verbalizes/displays adequate comfort level or patient's stated pain goal  Description: INTERVENTIONS:  - Encourage pt to monitor pain and request assistance  - Assess pain using appropriate pain scale  - Administer analgesics based on type and severity of pain and evaluate response  - Implement non-pharmacological measures as appropriate and evaluate response  - Consider cultural and social influences on pain and pain management  - Manage/alleviate anxiety  - Utilize distraction and/or relaxation techniques  - Monitor for opioid side effects  - Notify MD/LIP if interventions unsuccessful or patient reports new pain  - Anticipate increased pain with activity and pre-medicate as appropriate  Outcome: Progressing

## 2023-07-23 NOTE — PLAN OF CARE
Assumed care at 0730  A/O x4  RA  NSR on tele  VSS   Complains of significant neck pain. Ice packs applied. SBA with walker. Up in chair. Regular diet  Nasal spray q3 hrs given this morning  Non-cardiac electrolyte protocol  0.9 @ 83 ml in L PIV c/d/I - now discontinued  Refusing SCDs. No VTE d/t risk of bleeding. PRN norco available. Pt set to discharge home this morning. Pt updated on poc. Will continue with plan of care until discharge complete.      Problem: HEMATOLOGIC - ADULT  Goal: Maintains hematologic stability  Description: INTERVENTIONS  - Assess for signs and symptoms of bleeding or hemorrhage  - Monitor labs and vital signs for trends  - Administer supportive blood products/factors, fluids and medications as ordered and appropriate  - Administer supportive blood products/factors as ordered and appropriate  Outcome: Progressing  Goal: Free from bleeding injury  Description: (Example usage: patient with low platelets)  INTERVENTIONS:  - Avoid intramuscular injections, enemas and rectal medication administration  - Ensure safe mobilization of patient  - Hold pressure on venipuncture sites to achieve adequate hemostasis  - Assess for signs and symptoms of internal bleeding  - Monitor lab trends  - Patient is to report abnormal signs of bleeding to staff  - Avoid use of toothpicks and dental floss  - Use electric shaver for shaving  - Use soft bristle tooth brush  - Limit straining and forceful nose blowing  Outcome: Progressing     Problem: Impaired Functional Mobility  Goal: Achieve highest/safest level of mobility/gait  Description: Interventions:  - Assess patient's functional ability and stability  - Promote increasing activity/tolerance for mobility and gait  - Educate and engage patient/family in tolerated activity level and precautions  - Recommend use of  RW for transfers and ambulation  Outcome: Progressing     Problem: PAIN - ADULT  Goal: Verbalizes/displays adequate comfort level or patient's stated pain goal  Description: INTERVENTIONS:  - Encourage pt to monitor pain and request assistance  - Assess pain using appropriate pain scale  - Administer analgesics based on type and severity of pain and evaluate response  - Implement non-pharmacological measures as appropriate and evaluate response  - Consider cultural and social influences on pain and pain management  - Manage/alleviate anxiety  - Utilize distraction and/or relaxation techniques  - Monitor for opioid side effects  - Notify MD/LIP if interventions unsuccessful or patient reports new pain  - Anticipate increased pain with activity and pre-medicate as appropriate  Outcome: Progressing

## 2023-07-26 ENCOUNTER — PATIENT OUTREACH (OUTPATIENT)
Dept: CASE MANAGEMENT | Age: 68
End: 2023-07-26

## 2023-07-26 NOTE — PROGRESS NOTES
Uncertain Causes of Diarrhea (Adult)    Diarrhea is when stools are loose and watery. This can be caused by:  · Viral infections  · Bacterial infections  · Food poisoning  · Parasites  · Irritable bowel syndrome (IBS)  · Inflammatory bowel diseases such as ulcerative colitis, Crohn's disease, and celiac disease  · Food intolerance, such as to lactose, the sugar found in milk and milk products  · Reaction to medicines like antibiotics, laxatives, cancer drugs, and antacids  Along with diarrhea, you may also have:  · Abdominal pain and cramping  · Nausea and vomiting  · Loss of bowel control  · Fever and chills  · Bloody stools  In some cases, antibiotics may help to treat diarrhea. You may have a stool sample test. This is done to see what is causing your diarrhea, and if antibiotics will help treat it. The results of a stool sample test may take up to 2 days. The healthcare provider may not give you antibiotics until he or she has the stool test results.  Diarrhea can cause dehydration. This is the loss of too much water and other fluids from the body. When this occurs, body fluid must be replaced. This can be done with oral rehydration solutions. Oral rehydration solutions are available at drugstores and grocery stores without a prescription.  Home care  Follow all instructions given by your healthcare provider. Rest at home for the next 24 hours, or until you feel better. Avoid caffeine, tobacco, and alcohol. These can make diarrhea, cramping, and pain worse.  If taking medicines:  · Dont take over-the-counter diarrhea or nausea medicines unless your healthcare provider tells you to.  · You may use acetaminophen or NSAID medicines like ibuprofen or naproxen to reduce pain and fever. Dont use these if you have chronic liver or kidney disease, or ever had a stomach ulcer or gastrointestinal bleeding. Don't use NSAID medicines if you are already taking one for another condition (like arthritis) or are on daily  Pt would like to schedule w/ [facility]  [address] [phone]  Apt: Nov 15 @10am   On wait list     Confirmed w/ pt  Closing encounter aspirin therapy (such as for heart disease or after a stroke). Talk with your healthcare provider first.  · If antibiotics were prescribed, be sure you take them until they are finished. Dont stop taking them even when you feel better. Antibiotics must be taken as a full course.  To prevent the spread of illness:  · Remember that washing with soap and water and using alcohol-based  is the best way to prevent the spread of infection.  · Clean the toilet after each use.  · Wash your hands before eating.  · Wash your hands before and after preparing food. Keep in mind that people with diarrhea or vomiting should not prepare food for others.  · Wash your hands after using cutting boards, countertops, and knives that have been in contact with raw foods.  · Wash and then peel fruits and vegetables.  · Keep uncooked meats away from cooked and ready-to-eat foods.  · Use a food thermometer when cooking. Cook poultry to at least 165°F (74°C). Cook ground meat (beef, veal, pork, lamb) to at least 160°F (71°C). Cook fresh beef, veal, lamb, and pork to at least 145°F (63°C).  · Dont eat raw or undercooked eggs (poached or kvng side up), poultry, meat, or unpasteurized milk and juices.  Food and drinks  The main goal while treating vomiting or diarrhea is to prevent dehydration. This is done by taking small amounts of liquids often.  · Keep in mind that liquids are more important than food right now.  · Drink only small amounts of liquids at a time.  · Dont force yourself to eat, especially if you are having cramping, vomiting, or diarrhea. Dont eat large amounts at a time, even if you are hungry.  · If you eat, avoid fatty, greasy, spicy, or fried foods.  · Dont eat dairy foods or drink milk if you have diarrhea. These can make diarrhea worse.  During the first 24 hours you can try:  · Oral rehydration solutions. Do not use sports drinks. They have too much sugar and not enough electrolytes.  · Soft drinks without  caffeine  · Ginger ale  · Water (plain or flavored)  · Decaf tea or coffee  · Clear broth, consommé, or bouillon  · Gelatin, popsicles, or frozen fruit juice bars  The second 24 hours, if you are feeling better, you can add:  · Hot cereal, plain toast, bread, rolls, or crackers  · Plain noodles, rice, mashed potatoes, chicken noodle soup, or rice soup  · Unsweetened canned fruit (no pineapple)  · Bananas  As you recover:  · Limit fat intake to less than 15 grams per day. Dont eat margarine, butter, oils, mayonnaise, sauces, gravies, fried foods, peanut butter, meat, poultry, or fish.  · Limit fiber. Dont eat raw or cooked vegetables, fresh fruits except bananas, or bran cereals.  · Limit caffeine and chocolate.  · Limit dairy.  · Dont use spices or seasonings except salt.  · Go back to your normal diet over time, as you feel better and your symptoms improve.  · If the symptoms come back, go back to a simple diet or clear liquids.  Follow-up care  Follow up with your healthcare provider, or as advised. If a stool sample was taken or cultures were done, call the healthcare provider for the results as instructed.  Call 911  Call 911 if you have any of these symptoms:  · Trouble breathing  · Confusion  · Extreme drowsiness or trouble walking  · Loss of consciousness  · Rapid heart rate  · Chest pain  · Stiff neck  · Seizure  When to seek medical advice  Call your healthcare provider right away if any of these occur:  · Abdominal pain that gets worse  · Constant lower right abdominal pain  · Continued vomiting and inability to keep liquids down  · Diarrhea more than 5 times a day  · Blood in vomit or stool  · Dark urine or no urine for 8 hours, dry mouth and tongue, tiredness, weakness, or dizziness  · Drowsiness  · New rash  · You dont get better in 2 to 3 days  · Fever of 100.4°F (38°C) or higher that doesnt get lower with medicine  Date Last Reviewed: 1/3/2016  © 4206-8449 [employer]. [address] [place] [address]  "[place] [address]. All rights reserved. This information is not intended as a substitute for professional medical care. Always follow your healthcare professional's instructions.        Diarrhea (Adult, Viral)    Diarrhea caused by a virus is often called viral gastroenteritis. Many people call it the "stomach flu," but it has nothing to do with influenza. The virus that causes diarrhea affects the stomach and intestinal tract and usually lasts from 2 to 7 days. Diarrhea is the passing of loose, watery stools 3 or more times a day.  Symptoms  Along with diarrhea, you may have these symptoms:  · Abdominal pain and cramping  · Nausea and vomiting  · Loss of bowel control  · Fever and chills  · Bloody stools  The danger from repeated diarrhea is dehydration. Dehydration is the loss of too much water and other fluids from the body without taking in enough to replace what is lost.  Antibiotics are not effective in this illness, but there are a number of things you can do at home that will help.  Home care  Follow these home care measures:  · If symptoms are severe, rest at home for the next 24 hours or until you are feeling better.  · Wash your hands with soap and water or alcohol-based  to prevent the spread of infection. Wash your hands after touching anyone who is sick.  · Wash your hands after using the toilet and before meals. Clean the toilet after each use.  Food preparation:  · People with diarrhea should not prepare food for others. When preparing foods, wash your hands after touching anyone who is sick.  · Wash your hands after using cutting boards, countertops, and knives that have been in contact with raw food.  · Keep uncooked meats away from cooked and ready-to-eat foods.  Medications:  · You may use acetaminophen or NSAIDS such as ibuprofen or naproxen to control fever unless another medicine was prescribed.  If you have chronic liver or kidney disease or ever had a stomach ulcer or " gastrointestinal bleeding, talk with your healthcare provider before using these medicines. Aspirin should never be used in anyone under 18 years of age who is ill with a fever. It may cause severe liver damage. Don't use NSAID medicines if you are already taking one for another condition (like arthritis) or are on aspirin (such as for heart disease or after a stroke).  · Anti-diarrhea medicine should be taken for this condition only if advised by your healthcare provider. Sometimes anti-diarrhea medicine can make your condition worse.  Diet:  · Water and clear liquids are important so you do not get dehydrated. Drink small amounts at a time, do not guzzle it down. If you are very dehydrated, sports drinks aren't a good choice. They have too much sugar and not enough electrolytes. In this case, commercially available products called oral rehydration solutions are best.  · Caffeine, tobacco, and alcohol can make the diarrhea, cramping, and pain worse.  · Do not force yourself to eat, especially if you have cramping, vomiting, or diarrhea. Do not eat large amounts at a time, even if you are hungry. It may make you feel worse.  · If you eat, avoid fatty, greasy, spicy, or fried foods.  · No dairy products, as they can make diarrhea worse.  During the first 24 Hours (the first full day) follow the diet below:  · Beverages: Water, clear liquids, soft drinks without caffeine; ginger ale, mineral water (plain or flavored), decaffeinated tea and coffee.  · Soups: Clear broth, consommé and bouillon  · Desserts: Plain gelatin, popsicles and fruit juice bars  During the next 24 hours (the second day) you may add the following to the above if you have improved:  · Hot cereal, plain toast, bread, rolls, crackers  · Plain noodles, rice, mashed potatoes, chicken noodle or rice soup  · Unsweetened canned fruit (avoid pineapple), bananas  · Limit fat intake to less than 15 grams per day by avoiding margarine, butter, oils,  mayonnaise, sauces, gravies, fried foods, peanut butter, meat, poultry and fish.  · Limit fiber; avoid raw or cooked vegetables, fresh fruits (except bananas) and bran cereals.  · Limit caffeine and chocolate. No spices or seasonings except salt.  During the next 24 hours  · Gradually resume a normal diet, as you feel better and your symptoms improve.  · If at any time the diarrhea or cramping gets worse, go back to the simpler diet (above) or to clear liquids.  Follow-up care  Follow up with your healthcare provider, or as advised. Call if you are not improving within 24 hours or if the diarrhea lasts more than one week. If a stool (diarrhea) sample was taken, you may call in 2 days (or as directed) for the results.  Call 911  Call 911 if any of these occur:  · Shortness of breath  · Chest pain  · Drowsiness, confusion, stiff neck, or seizure  When to seek medical care  Get prompt medical attention if any of the following occur:  · Increasing abdominal pain or constant lower right abdominal pain  · Continued vomiting (unable to keep liquids down)  · Frequent diarrhea (more than 5 times a day)  · Blood in vomit or stool (black or red color)  · Reduced oral intake  · Dark urine, reduced urine output  · Weakness, dizziness  · Drowsiness  · Fever of 100.4°F (38°C) oral or higher, not better with fever medicine  · New rash  Call 911  Call emergency services if any of the following occur:  · Trouble breathing  · Confused  · Severe drowsiness or trouble awakening  · Fainting or loss of consciousness  · Rapid heart rate  · Seizure  · Stiff neck  Date Last Reviewed: 11/16/2015 © 2000-2017 [employer]. [address] [place] [address]. All rights reserved. This information is not intended as a substitute for professional medical care. Always follow your healthcare professional's instructions.      Rescue plan as discussed (6puffs of albuterol every 20 minutes up to 1 hour, then continue every 2-4  hours)

## 2023-07-26 NOTE — PROGRESS NOTES
VM received; pt requesting assistance w/scheduling apt (dc 07/23)    Please call pt and assist w/scheduling

## 2023-07-31 ENCOUNTER — TELEPHONE (OUTPATIENT)
Dept: SURGERY | Facility: CLINIC | Age: 68
End: 2023-07-31

## 2023-07-31 DIAGNOSIS — S12.01XA CLOSED STABLE BURST FRACTURE OF FIRST CERVICAL VERTEBRA, INITIAL ENCOUNTER (HCC): Primary | ICD-10-CM

## 2023-07-31 NOTE — TELEPHONE ENCOUNTER
Paged by Dr. Chayo Rosario from radiology regarding spinal fracture findings related to CT facial performed on 7/21/2023. Patient seen in the hospital as a consult on 7/22/2023. Additional findings noted including anterior arch fracture in addition to posterior. Independently reviewed films again. No overhang to suggest ligamentous injury. Clinically cleared collar on initial evaluation. Despite additional findings, and while technically considered a burst fracture of C1, fracture appears to be mechanically stable without signs of ligamentous injury. Will have patient follow up in clinic this week to assess progress. Davy Mukherjee MD  Neurological Surgery    [facility] [facility]  [phone]  Pager [phone]  7/31/2023 3:37 PM      This note was created using a voice-recognition transcribing system. Incorrect words or phrases may have been missed during proofreading. Please interpret accordingly.

## 2023-08-01 ENCOUNTER — OFFICE VISIT (OUTPATIENT)
Dept: SURGERY | Facility: CLINIC | Age: 68
End: 2023-08-01
Payer: COMMERCIAL

## 2023-08-01 VITALS
DIASTOLIC BLOOD PRESSURE: 78 MMHG | WEIGHT: 180 LBS | BODY MASS INDEX: 32.3 KG/M2 | HEIGHT: 62.5 IN | SYSTOLIC BLOOD PRESSURE: 118 MMHG

## 2023-08-01 DIAGNOSIS — S12.01XD CLOSED STABLE BURST FRACTURE OF FIRST CERVICAL VERTEBRA WITH ROUTINE HEALING, SUBSEQUENT ENCOUNTER: Primary | ICD-10-CM

## 2023-08-01 PROCEDURE — 1111F DSCHRG MED/CURRENT MED MERGE: CPT | Performed by: STUDENT IN AN ORGANIZED HEALTH CARE EDUCATION/TRAINING PROGRAM

## 2023-08-01 PROCEDURE — 3078F DIAST BP <80 MM HG: CPT | Performed by: STUDENT IN AN ORGANIZED HEALTH CARE EDUCATION/TRAINING PROGRAM

## 2023-08-01 PROCEDURE — 3074F SYST BP LT 130 MM HG: CPT | Performed by: STUDENT IN AN ORGANIZED HEALTH CARE EDUCATION/TRAINING PROGRAM

## 2023-08-01 PROCEDURE — 99214 OFFICE O/P EST MOD 30 MIN: CPT | Performed by: STUDENT IN AN ORGANIZED HEALTH CARE EDUCATION/TRAINING PROGRAM

## 2023-08-01 PROCEDURE — 3008F BODY MASS INDEX DOCD: CPT | Performed by: STUDENT IN AN ORGANIZED HEALTH CARE EDUCATION/TRAINING PROGRAM

## 2023-08-01 NOTE — PROGRESS NOTES
2050 [facility]  Neurological Surgery Established Patient Clinic Note    Eladio Edmond  [birth year]  [id number]  PCP: Khanh Boyd MD  Referring Provider: Godfrey Pierre DO    REASON FOR VISIT:  C1 burst fracture    HISTORY OF PRESENT ILLNESS:  Eladio Edmond is a(n) 74year old female with HTN, HLD, GERD who was admitted s/p fall. History obtained from spouse at bedside as patient has no recollection of events. Per , the morning of 7/21 around 4 am he heard a noise and found her at the bottom of the stairs with blood all around her. She has been disoriented and complaining of neck pain since the fall. She has significant left sided periorbital edema. Trauma imaging was negative for intracranial injuries but did reveal a tiny non-displaced partial fracture of the left posterior arch of C1. Neurosurgery was consulted. INTERVAL HISTORY 8/1/2023:   Ms. Mya Cook presents for close follow-up of her C1 fracture. As documented in a separate TE note, I was contacted by radiology regarding additional findings on her CT of the cervical spine. Therefore, she was brought in for close follow-up. Today, she reports continued improvement in her condition. Her bruising over her face is still present but much improved. She continues to have a pretty sizable left subgaleal hematoma. As far as her neck is concerned, she reports today by day improvement of her neck pain. She is not necessitating c-collar, not even for comfort. Her pain is controlled with Tylenol and anti-inflammatories. She does feel some tension when rotating her neck in certain directions, but this is nonpainful more so than slightly restrictive. She denies any paresthesias, balance difficulties, decreased dexterity, or any other red flags for myelopathy.     PAST MEDICAL HISTORY:  Past Medical History:   Diagnosis Date    Allergic rhinitis due to pollen     Carcinoma in situ of cervix uteri 1/1986    Esophageal reflux     High blood pressure     High cholesterol     Menopause 2006    Osteoarthritis     generalized    Pure hypercholesterolemia     Rosacea     Tubular adenoma of colon 2012    Visual impairment     glasses     PAST SURGICAL HISTORY:  Past Surgical History:   Procedure Laterality Date              COLONOSCOPY N/A 2021    Procedure: COLONOSCOPY with polypectomy;  Surgeon: Michael Daugherty MD;  Location: [place] [address]  2012    Procedure: COLONOSCOPY, POSSIBLE BIOPSY, POSSIBLE POLYPECTOMY 19247;  Surgeon: Michael Daugherty MD;  Location: [facility] N/A 2016    Procedure: COLONOSCOPY, POSSIBLE BIOPSY, POSSIBLE POLYPECTOMY 28971;  Surgeon:  Michael Daugherty MD;  Location: [address] [place] [address]    CT HEART W/ CALCIUM SCORING  2009    calcium score of 0    CT HEART W/ CALCIUM SCORING N/A 2019    calcium score is zero    IMPACT TOOTH REM BONY W/COMP Bilateral 1973    wisdom teeth    KNEE REPLACEMENT SURGERY Right 2017    right    LASER SURGERY OF CERVIX  1986    PERIPHERAL VASCULAR SCREENING HISTORICAL CONV Bilateral 2016    PAD screen is normal    TOTAL KNEE REPLACEMENT Right 2017     FAMILY HISTORY:  family history includes Alcohol and Other Disorders Associated in her maternal grandfather; Arthritis in her sister; Breast Cancer in her maternal great-grandmother; Breast Cancer (age of onset: 64) in her sister; Cancer in her paternal grandfather; Cancer (age of onset: 76) in her mother; Cataracts in her mother; Clotting Disorder in her sister; Dementia in her paternal grandmother; Diabetes in her father, paternal grandfather, and sister; Ear Problems in her father; Eye Problems in her maternal grandmother; Heart Disease in her father and maternal grandmother; Heart Disorder in her father and paternal grandfather; Heart Surgery (age of onset: 54) in her father; High Cholesterol in her maternal grandmother and sister; Hypertension in her father, mother, and sister; Kidney Disease in her sister; Lipids in her maternal grandmother; Musculo-skelatal Disorder in her maternal grandmother; Neurological Disorder in her paternal grandmother; Obesity in her sister; Other in her sister; Pulmonary Disease in her maternal grandmother. SOCIAL HISTORY:   reports that she quit smoking about 40 years ago. Her smoking use included cigarettes. She has a 6.00 pack-year smoking history. She has never used smokeless tobacco. She reports current alcohol use of about 5.8 standard drinks of alcohol per week. She reports that she does not use drugs. ALLERGIES:    Amoxicillin-Pot Cla*    RASH    MEDICATIONS:  HYDROcodone-acetaminophen 5-325 MG Oral Tab, Take 1 tablet by mouth every 6 (six) hours as needed. , Disp: 30 tablet, Rfl: 0  ondansetron (ZOFRAN) 4 mg tablet, Take 1 tablet (4 mg total) by mouth every 8 (eight) hours as needed for Nausea., Disp: 30 tablet, Rfl: 0  polyethylene glycol, PEG 3350, 17 g Oral Powd Pack, Take 17 g by mouth daily as needed. , Disp: 30 each, Rfl: 0  ondansetron (ZOFRAN) 4 mg tablet, Take 1 tablet (4 mg total) by mouth every 8 (eight) hours as needed for Nausea., Disp: 30 tablet, Rfl: 0  losartan 50 MG Oral Tab, Take 1 tablet (50 mg total) by mouth daily. , Disp: 90 tablet, Rfl: 0  montelukast 10 MG Oral Tab, Take 1 tablet (10 mg total) by mouth nightly., Disp: 90 tablet, Rfl: 3  losartan-hydroCHLOROthiazide 50-12.5 MG Oral Tab, Take 1 tablet by mouth daily. , Disp: 90 tablet, Rfl: 3  atorvastatin 10 MG Oral Tab, TAKE 1 TABLET(10 MG) BY MOUTH EVERY DAY, Disp: 90 tablet, Rfl: 3  Meloxicam 15 MG Oral Tab, TAKE 1 TABLET(15 MG) BY MOUTH DAILY, Disp: 90 tablet, Rfl: 3  Fluticasone Propionate 50 MCG/ACT Nasal Suspension, by Each Nare route daily. , Disp: , Rfl:   Levocetirizine Dihydrochloride 5 MG Oral Tab, Take 1 tablet (5 mg total) by mouth every evening., Disp: , Rfl:   Cyanocobalamin (VITAMIN B 12) 100 MCG Oral Lozenge, Take 1,000 mcg by mouth daily. , Disp: 30 lozenge, Rfl: 0  ondansetron 4 MG Oral Tablet Dispersible, Take 1 tablet (4 mg total) by mouth every 4 (four) hours as needed for Nausea., Disp: 20 tablet, Rfl: 0  ibuprofen 400 MG Oral Tab, Take 1 tablet (400 mg total) by mouth every 6 (six) hours as needed for Pain., Disp: , Rfl:   Vitamin D3 2000 units Oral Cap, Take 2 capsules (4,000 Units total) by mouth daily. , Disp: 180 capsule, Rfl: 3  cetirizine 10 MG Oral Tab, Take 1 tablet (10 mg total) by mouth daily. , Disp: , Rfl:   Omeprazole Magnesium 20 MG Oral Tab EC, Take 1 tablet (20 mg total) by mouth daily. , Disp: 28 tablet, Rfl: 0    No current facility-administered medications on file prior to visit. REVIEW OF SYSTEMS:  All other systems were reviewed and were negative except for those previously mentioned in the HPI    PHYSICAL EXAMINATION:  General: No acute distress. Respiratory: Non-labored respirations bilaterally. No audible wheezing  Cardiovascular: Extremities warm and well-perfused. Abdomen: Soft, nontender, nondistended. Musculoskeletal: Moves all extremities well, symmetrically. Extremities: No edema.     NEUROLOGIC EXAMINATION:  Mental status: Alert and oriented x 3  Speech: Clear, fluent  Cranial nerves: PERRLA, EOMI, face symmetric, with normal strength and sensation, tongue and palate midline, SCM 5/5 bilaterally  Motor:     RIGHT  Delt 5/5   Bic 5/5  Tri 5/5   HI 5/5    5/5  IP 5/5   Quad 5/5   Ham 5/5   AT 5/5   EHL 5/5 Crispin 5/5     LEFT    Delt 5/5   Bic 5/5  Tri 5/5   HI 5/5    5/5  IP 5/5   Quad 5/5   Ham 5/5   AT 5/5   EHL 5/5 Crispin 5/5   No pronator drift  Tone: Normal  Atrophy/Fasciculations: None  Sensation: Normal to light touch, symmetric, no neglect  Cerebellar: Normal finger nose finger  Gait: Normal, nondistressed heel toe tandem gait      Reflexes: 2+ throughout, symmetric, no Ophelia's    IMAGING:  No new neurosurgical imaging for review    ASSESSMENT:  74 yoF s/p fall resulting in a mechanically stable C1 burst fracture involving the anterior and posterior arches of C1. No complicated radiographic features as there is no significant overhang of the lateral masses of C1 on C2. Her neck pain is improved. She remains neurologically intact. All consistent with absence of ligamentous injury. At this time, she may use her c-collar for comfort. Expectant management with symptom relief. Again, discussed red flags with family and patient. Will obtain an x-ray at the 1 month lillian after her trauma. Plan:  - RTC 2 weeks  - XR cervical prior to next    Ann Wilson MD  Neurological Surgery    [facility], [address] [facility]  [phone]  Pager [phone]  8/1/2023 4:03 PM      This note was created using a voice-recognition transcribing system. Incorrect words or phrases may have been missed during proofreading. Please interpret accordingly. Total Time    Established Patient Total Time       30  minutes. Activities       Preparing to see the patient (chart/tests/imaging review). Obtaining and/or reviewing separately obtained history. Performing a medically appropriate examination and/or evaluation. Counseling and educating the patient/family/caregiver. Ordering medications, tests, or procedures. Referring and communicating with other health care professionals (when not separately reported). Documenting clincal information in the electronic or other health record. Independently interpreting results (not separately reported). Communicating results to the patient/family/caregiver. Care coordination (not separately reported).

## 2023-08-17 ENCOUNTER — HOSPITAL ENCOUNTER (INPATIENT)
Facility: HOSPITAL | Age: 68
LOS: 1 days | Discharge: HOME | End: 2023-08-18
Attending: EMERGENCY MEDICINE | Admitting: INTERNAL MEDICINE
Payer: COMMERCIAL

## 2023-08-17 ENCOUNTER — HOSPITAL ENCOUNTER (OUTPATIENT)
Dept: GENERAL RADIOLOGY | Facility: HOSPITAL | Age: 68
Discharge: HOME | End: 2023-08-17
Attending: STUDENT IN AN ORGANIZED HEALTH CARE EDUCATION/TRAINING PROGRAM
Payer: COMMERCIAL

## 2023-08-17 ENCOUNTER — OFFICE VISIT (OUTPATIENT)
Dept: SURGERY | Facility: CLINIC | Age: 68
End: 2023-08-17
Payer: COMMERCIAL

## 2023-08-17 VITALS
BODY MASS INDEX: 31.89 KG/M2 | WEIGHT: 180 LBS | SYSTOLIC BLOOD PRESSURE: 112 MMHG | HEIGHT: 63 IN | DIASTOLIC BLOOD PRESSURE: 70 MMHG

## 2023-08-17 DIAGNOSIS — S12.01XD CLOSED STABLE BURST FRACTURE OF FIRST CERVICAL VERTEBRA WITH ROUTINE HEALING, SUBSEQUENT ENCOUNTER: Primary | ICD-10-CM

## 2023-08-17 DIAGNOSIS — S12.000A CLOSED DISPLACED FRACTURE OF FIRST CERVICAL VERTEBRA, UNSPECIFIED FRACTURE MORPHOLOGY, INITIAL ENCOUNTER (HCC): Primary | ICD-10-CM

## 2023-08-17 DIAGNOSIS — S12.01XD CLOSED STABLE BURST FRACTURE OF FIRST CERVICAL VERTEBRA WITH ROUTINE HEALING, SUBSEQUENT ENCOUNTER: ICD-10-CM

## 2023-08-17 PROCEDURE — 99285 EMERGENCY DEPT VISIT HI MDM: CPT

## 2023-08-17 PROCEDURE — 1111F DSCHRG MED/CURRENT MED MERGE: CPT | Performed by: STUDENT IN AN ORGANIZED HEALTH CARE EDUCATION/TRAINING PROGRAM

## 2023-08-17 PROCEDURE — 90471 IMMUNIZATION ADMIN: CPT

## 2023-08-17 PROCEDURE — 36415 COLL VENOUS BLD VENIPUNCTURE: CPT

## 2023-08-17 PROCEDURE — 3008F BODY MASS INDEX DOCD: CPT | Performed by: STUDENT IN AN ORGANIZED HEALTH CARE EDUCATION/TRAINING PROGRAM

## 2023-08-17 PROCEDURE — 3074F SYST BP LT 130 MM HG: CPT | Performed by: STUDENT IN AN ORGANIZED HEALTH CARE EDUCATION/TRAINING PROGRAM

## 2023-08-17 PROCEDURE — 72040 X-RAY EXAM NECK SPINE 2-3 VW: CPT | Performed by: STUDENT IN AN ORGANIZED HEALTH CARE EDUCATION/TRAINING PROGRAM

## 2023-08-17 PROCEDURE — 3078F DIAST BP <80 MM HG: CPT | Performed by: STUDENT IN AN ORGANIZED HEALTH CARE EDUCATION/TRAINING PROGRAM

## 2023-08-17 PROCEDURE — 99213 OFFICE O/P EST LOW 20 MIN: CPT | Performed by: STUDENT IN AN ORGANIZED HEALTH CARE EDUCATION/TRAINING PROGRAM

## 2023-08-17 NOTE — PROGRESS NOTES
2050 [facility]  Neurological Surgery Established Patient Clinic Note    Luis Starr  [birth year]  [id number]  PCP: Jostin James MD  Referring Provider: Booker Doyle DO     REASON FOR VISIT:  C1 burst fracture    HISTORY OF PRESENT ILLNESS:  Luis Starr is a(n) 79year old female with HTN, HLD, GERD who was admitted s/p fall. History obtained from spouse at bedside as patient has no recollection of events. Per , the morning of 7/21 around 4 am he heard a noise and found her at the bottom of the stairs with blood all around her. She has been disoriented and complaining of neck pain since the fall. She has significant left sided periorbital edema. Trauma imaging was negative for intracranial injuries but did reveal a tiny non-displaced partial fracture of the left posterior arch of C1. Neurosurgery was consulted. INTERVAL HISTORY 8/1/2023:   Ms. Sagar Monte presents for close follow-up of her C1 fracture. As documented in a separate TE note, I was contacted by radiology regarding additional findings on her CT of the cervical spine. Therefore, she was brought in for close follow-up. Today, she reports continued improvement in her condition. Her bruising over her face is still present but much improved. She continues to have a pretty sizable left subgaleal hematoma. As far as her neck is concerned, she reports today by day improvement of her neck pain. She is not necessitating c-collar, not even for comfort. Her pain is controlled with Tylenol and anti-inflammatories. She does feel some tension when rotating her neck in certain directions, but this is nonpainful more so than slightly restrictive. She denies any paresthesias, balance difficulties, decreased dexterity, or any other red flags for myelopathy. INTERVAL HISTORY 8/17/2023:   Ms. Sagar Monte presents for continued close follow-up of her C1 fracture. Today, she is happy to report continued improvement in her pain. As of last week she was still able to feel little little whenever it was humid outside. The last 2 days this is felt much better. She had x-rays done today prior to her visit, this was reportedly stable. She is here to review and discuss the results. PAST MEDICAL HISTORY:  Past Medical History:   Diagnosis Date    Allergic rhinitis due to pollen     Carcinoma in situ of cervix uteri 1986    Esophageal reflux     High blood pressure     High cholesterol     Menopause 2006    Osteoarthritis     generalized    Pure hypercholesterolemia     Rosacea     Tubular adenoma of colon 2012    Visual impairment     glasses     PAST SURGICAL HISTORY:  Past Surgical History:   Procedure Laterality Date              COLONOSCOPY N/A 2021    Procedure: COLONOSCOPY with polypectomy;  Surgeon: Deirdre Potter MD;  Location: [place] [address]  2012    Procedure: COLONOSCOPY, POSSIBLE BIOPSY, POSSIBLE POLYPECTOMY 27795;  Surgeon: Deirdre Potter MD;  Location: [facility] N/A 2016    Procedure: COLONOSCOPY, POSSIBLE BIOPSY, POSSIBLE POLYPECTOMY 54241;  Surgeon:  Deirdre Potter MD;  Location: [address] [place] [address]    CT HEART W/ CALCIUM SCORING  2009    calcium score of 0    CT HEART W/ CALCIUM SCORING N/A 2019    calcium score is zero    IMPACT TOOTH REM BONY W/COMP Bilateral 1973    wisdom teeth    KNEE REPLACEMENT SURGERY Right 2017    right    LASER SURGERY OF CERVIX  1986    PERIPHERAL VASCULAR SCREENING HISTORICAL CONV Bilateral 2016    PAD screen is normal    TOTAL KNEE REPLACEMENT Right 2017     FAMILY HISTORY:  family history includes Alcohol and Other Disorders Associated in her maternal grandfather; Arthritis in her sister; Breast Cancer in her maternal great-grandmother; Breast Cancer (age of onset: 64) in her sister; Cancer in her paternal grandfather; Cancer (age of onset: 76) in her mother; Cataracts in her mother; Clotting Disorder in her sister; Dementia in her paternal grandmother; Diabetes in her father, paternal grandfather, and sister; Ear Problems in her father; Eye Problems in her maternal grandmother; Heart Disease in her father and maternal grandmother; Heart Disorder in her father and paternal grandfather; Heart Surgery (age of onset: 54) in her father; High Cholesterol in her maternal grandmother and sister; Hypertension in her father, mother, and sister; Kidney Disease in her sister; Lipids in her maternal grandmother; Musculo-skelatal Disorder in her maternal grandmother; Neurological Disorder in her paternal grandmother; Obesity in her sister; Other in her sister; Pulmonary Disease in her maternal grandmother. SOCIAL HISTORY:   reports that she quit smoking about 40 years ago. Her smoking use included cigarettes. She has a 6.00 pack-year smoking history. She has never used smokeless tobacco. She reports current alcohol use of about 5.8 standard drinks of alcohol per week. She reports that she does not use drugs. ALLERGIES:    Amoxicillin-Pot Cla*    RASH    MEDICATIONS:  HYDROcodone-acetaminophen 5-325 MG Oral Tab, Take 1 tablet by mouth every 6 (six) hours as needed. , Disp: 30 tablet, Rfl: 0  ondansetron (ZOFRAN) 4 mg tablet, Take 1 tablet (4 mg total) by mouth every 8 (eight) hours as needed for Nausea., Disp: 30 tablet, Rfl: 0  polyethylene glycol, PEG 3350, 17 g Oral Powd Pack, Take 17 g by mouth daily as needed. , Disp: 30 each, Rfl: 0  ondansetron (ZOFRAN) 4 mg tablet, Take 1 tablet (4 mg total) by mouth every 8 (eight) hours as needed for Nausea., Disp: 30 tablet, Rfl: 0  losartan 50 MG Oral Tab, Take 1 tablet (50 mg total) by mouth daily. , Disp: 90 tablet, Rfl: 0  montelukast 10 MG Oral Tab, Take 1 tablet (10 mg total) by mouth nightly., Disp: 90 tablet, Rfl: 3  losartan-hydroCHLOROthiazide 50-12.5 MG Oral Tab, Take 1 tablet by mouth daily. , Disp: 90 tablet, Rfl: 3  atorvastatin 10 MG Oral Tab, TAKE 1 TABLET(10 MG) BY MOUTH EVERY DAY, Disp: 90 tablet, Rfl: 3  Meloxicam 15 MG Oral Tab, TAKE 1 TABLET(15 MG) BY MOUTH DAILY, Disp: 90 tablet, Rfl: 3  Fluticasone Propionate 50 MCG/ACT Nasal Suspension, by Each Nare route daily. , Disp: , Rfl:   Levocetirizine Dihydrochloride 5 MG Oral Tab, Take 1 tablet (5 mg total) by mouth every evening., Disp: , Rfl:   Cyanocobalamin (VITAMIN B 12) 100 MCG Oral Lozenge, Take 1,000 mcg by mouth daily. , Disp: 30 lozenge, Rfl: 0  ondansetron 4 MG Oral Tablet Dispersible, Take 1 tablet (4 mg total) by mouth every 4 (four) hours as needed for Nausea., Disp: 20 tablet, Rfl: 0  ibuprofen 400 MG Oral Tab, Take 1 tablet (400 mg total) by mouth every 6 (six) hours as needed for Pain., Disp: , Rfl:   Vitamin D3 2000 units Oral Cap, Take 2 capsules (4,000 Units total) by mouth daily. , Disp: 180 capsule, Rfl: 3  cetirizine 10 MG Oral Tab, Take 1 tablet (10 mg total) by mouth daily. , Disp: , Rfl:   Omeprazole Magnesium 20 MG Oral Tab EC, Take 1 tablet (20 mg total) by mouth daily. , Disp: 28 tablet, Rfl: 0    No current facility-administered medications on file prior to visit. REVIEW OF SYSTEMS:  All other systems were reviewed and were negative except for those previously mentioned in the HPI     PHYSICAL EXAMINATION:  General: No acute distress. Respiratory: Non-labored respirations bilaterally. No audible wheezing  Cardiovascular: Extremities warm and well-perfused. Abdomen: Soft, nontender, nondistended. Musculoskeletal: Moves all extremities well, symmetrically. Extremities: No edema.      NEUROLOGIC EXAMINATION:  Mental status: Alert and oriented x 3  Speech: Clear, fluent  Cranial nerves: PERRLA, EOMI, face symmetric, with normal strength and sensation, tongue and palate midline, SCM 5/5 bilaterally  Motor:                           RIGHT  Delt 5/5   Bic 5/5  Tri 5/5   HI 5/5    5/5  IP 5/5   Quad 5/5   Ham 5/5   AT 5/5 EHL 5/5 Crispin 5/5                          LEFT    Delt 5/5   Bic 5/5  Tri 5/5   HI 5/5    5/5  IP 5/5   Quad 5/5   Ham 5/5   AT 5/5   EHL 5/5 Crispin 5/5            No pronator drift  Tone: Normal  Atrophy/Fasciculations: None  Sensation: Normal to light touch, symmetric, no neglect  Cerebellar: Normal finger nose finger  Gait: Normal, nondistressed heel toe tandem gait      Reflexes: 2+ throughout, symmetric, no Opheila's    IMAGING:  XR cervical 8/17/2023: Normal alignment of the cervical spine. No overhang of C1 on C2.    ASSESSMENT:  74 yoF s/p fall resulting in a mechanically stable C1 burst fracture involving the anterior and posterior arches of C1. No complicated radiographic features as there is no significant overhang of the lateral masses of C1 on C2. Her neck pain is improved. She remains neurologically intact. All consistent with absence of ligamentous injury. At this time, she may use her c-collar for comfort. Expectant management with symptom relief. Will obtain an x-ray at the 3 month lillian after her trauma. We again discussed any red flags. She will notify the office sooner that the scheduled 3 month follow up if issues. Plan:  -RTC 2 months (2 months after injury)  -X-rays prior to next    Cris Box MD  Neurological Surgery    [place] [address] [address] [place] [address]  [phone]  Pager [phone]  8/17/2023 1:53 PM      This note was created using a voice-recognition transcribing system. Incorrect words or phrases may have been missed during proofreading. Please interpret accordingly. Total Time    Established Patient Total Time       20  minutes. Activities       Preparing to see the patient (chart/tests/imaging review). Obtaining and/or reviewing separately obtained history. Performing a medically appropriate examination and/or evaluation. Counseling and educating the patient/family/caregiver.        Ordering medications, tests, or procedures. Referring and communicating with other health care professionals (when not separately reported). Documenting clincal information in the electronic or other health record. Independently interpreting results (not separately reported). Communicating results to the patient/family/caregiver. Care coordination (not separately reported).

## 2023-08-18 ENCOUNTER — APPOINTMENT (OUTPATIENT)
Dept: MRI IMAGING | Facility: HOSPITAL | Age: 68
End: 2023-08-18
Attending: NURSE PRACTITIONER
Payer: COMMERCIAL

## 2023-08-18 ENCOUNTER — APPOINTMENT (OUTPATIENT)
Dept: CT IMAGING | Facility: HOSPITAL | Age: 68
End: 2023-08-18
Attending: EMERGENCY MEDICINE
Payer: COMMERCIAL

## 2023-08-18 VITALS
TEMPERATURE: 99 F | HEART RATE: 90 BPM | RESPIRATION RATE: 16 BRPM | SYSTOLIC BLOOD PRESSURE: 135 MMHG | DIASTOLIC BLOOD PRESSURE: 77 MMHG | OXYGEN SATURATION: 99 % | WEIGHT: 179.88 LBS | BODY MASS INDEX: 32 KG/M2

## 2023-08-18 PROBLEM — S12.000A CLOSED DISPLACED FRACTURE OF FIRST CERVICAL VERTEBRA, UNSPECIFIED FRACTURE MORPHOLOGY, INITIAL ENCOUNTER (HCC): Status: ACTIVE | Noted: 2023-08-18

## 2023-08-18 LAB
ALBUMIN SERPL-MCNC: 4.2 G/DL (ref 3.4–5)
ALBUMIN/GLOB SERPL: 1.3 {RATIO} (ref 1–2)
ALP LIVER SERPL-CCNC: 101 U/L
ALT SERPL-CCNC: 39 U/L
ANION GAP SERPL CALC-SCNC: 4 MMOL/L (ref 0–18)
ANION GAP SERPL CALC-SCNC: 8 MMOL/L (ref 0–18)
AST SERPL-CCNC: 18 U/L (ref 15–37)
BASOPHILS # BLD AUTO: 0.03 X10(3) UL (ref 0–0.2)
BASOPHILS NFR BLD AUTO: 0.4 %
BILIRUB SERPL-MCNC: 0.7 MG/DL (ref 0.1–2)
BUN BLD-MCNC: 13 MG/DL (ref 7–18)
BUN BLD-MCNC: 18 MG/DL (ref 7–18)
CALCIUM BLD-MCNC: 9.4 MG/DL (ref 8.5–10.1)
CALCIUM BLD-MCNC: 9.5 MG/DL (ref 8.5–10.1)
CHLORIDE SERPL-SCNC: 96 MMOL/L (ref 98–112)
CHLORIDE SERPL-SCNC: 98 MMOL/L (ref 98–112)
CO2 SERPL-SCNC: 27 MMOL/L (ref 21–32)
CO2 SERPL-SCNC: 28 MMOL/L (ref 21–32)
CREAT BLD-MCNC: 0.94 MG/DL
CREAT BLD-MCNC: 0.95 MG/DL
EGFRCR SERPLBLD CKD-EPI 2021: 66 ML/MIN/1.73M2 (ref 60–?)
EGFRCR SERPLBLD CKD-EPI 2021: 67 ML/MIN/1.73M2 (ref 60–?)
EOSINOPHIL # BLD AUTO: 0.08 X10(3) UL (ref 0–0.7)
EOSINOPHIL NFR BLD AUTO: 0.9 %
ERYTHROCYTE [DISTWIDTH] IN BLOOD BY AUTOMATED COUNT: 12.5 %
ETHANOL SERPL-MCNC: <3 MG/DL (ref ?–3)
GLOBULIN PLAS-MCNC: 3.2 G/DL (ref 2.8–4.4)
GLUCOSE BLD-MCNC: 115 MG/DL (ref 70–99)
GLUCOSE BLD-MCNC: 123 MG/DL (ref 70–99)
HCT VFR BLD AUTO: 38.7 %
HGB BLD-MCNC: 13.3 G/DL
IMM GRANULOCYTES # BLD AUTO: 0.03 X10(3) UL (ref 0–1)
IMM GRANULOCYTES NFR BLD: 0.4 %
INR BLD: 0.94 (ref 0.85–1.16)
LYMPHOCYTES # BLD AUTO: 1.19 X10(3) UL (ref 1–4)
LYMPHOCYTES NFR BLD AUTO: 13.9 %
MCH RBC QN AUTO: 30 PG (ref 26–34)
MCHC RBC AUTO-ENTMCNC: 34.4 G/DL (ref 31–37)
MCV RBC AUTO: 87.2 FL
MONOCYTES # BLD AUTO: 0.65 X10(3) UL (ref 0.1–1)
MONOCYTES NFR BLD AUTO: 7.6 %
NEUTROPHILS # BLD AUTO: 6.59 X10 (3) UL (ref 1.5–7.7)
NEUTROPHILS # BLD AUTO: 6.59 X10(3) UL (ref 1.5–7.7)
NEUTROPHILS NFR BLD AUTO: 76.8 %
OSMOLALITY SERPL CALC.SUM OF ELEC: 271 MOSM/KG (ref 275–295)
OSMOLALITY SERPL CALC.SUM OF ELEC: 275 MOSM/KG (ref 275–295)
PLATELET # BLD AUTO: 282 10(3)UL (ref 150–450)
POTASSIUM SERPL-SCNC: 3.6 MMOL/L (ref 3.5–5.1)
POTASSIUM SERPL-SCNC: 4.2 MMOL/L (ref 3.5–5.1)
PROT SERPL-MCNC: 7.4 G/DL (ref 6.4–8.2)
PROTHROMBIN TIME: 12.6 SECONDS (ref 11.6–14.8)
RBC # BLD AUTO: 4.44 X10(6)UL
SODIUM SERPL-SCNC: 130 MMOL/L (ref 136–145)
SODIUM SERPL-SCNC: 131 MMOL/L (ref 136–145)
WBC # BLD AUTO: 8.6 X10(3) UL (ref 4–11)

## 2023-08-18 PROCEDURE — 72141 MRI NECK SPINE W/O DYE: CPT | Performed by: NURSE PRACTITIONER

## 2023-08-18 PROCEDURE — 82077 ASSAY SPEC XCP UR&BREATH IA: CPT | Performed by: EMERGENCY MEDICINE

## 2023-08-18 PROCEDURE — 85610 PROTHROMBIN TIME: CPT | Performed by: EMERGENCY MEDICINE

## 2023-08-18 PROCEDURE — 72125 CT NECK SPINE W/O DYE: CPT | Performed by: EMERGENCY MEDICINE

## 2023-08-18 PROCEDURE — 70450 CT HEAD/BRAIN W/O DYE: CPT | Performed by: EMERGENCY MEDICINE

## 2023-08-18 PROCEDURE — 80053 COMPREHEN METABOLIC PANEL: CPT | Performed by: EMERGENCY MEDICINE

## 2023-08-18 PROCEDURE — 85025 COMPLETE CBC W/AUTO DIFF WBC: CPT | Performed by: EMERGENCY MEDICINE

## 2023-08-18 RX ORDER — ACETAMINOPHEN 325 MG/1
650 TABLET ORAL EVERY 4 HOURS PRN
Refills: 0 | Status: SHIPPED | COMMUNITY
Start: 2023-08-18

## 2023-08-18 RX ORDER — ACETAMINOPHEN 325 MG/1
650 TABLET ORAL EVERY 4 HOURS PRN
Status: DISCONTINUED | OUTPATIENT
Start: 2023-08-18 | End: 2023-08-18

## 2023-08-18 RX ORDER — PANTOPRAZOLE SODIUM 20 MG/1
20 TABLET, DELAYED RELEASE ORAL
Status: DISCONTINUED | OUTPATIENT
Start: 2023-08-18 | End: 2023-08-18

## 2023-08-18 RX ORDER — ATORVASTATIN CALCIUM 10 MG/1
10 TABLET, FILM COATED ORAL NIGHTLY
Status: DISCONTINUED | OUTPATIENT
Start: 2023-08-18 | End: 2023-08-18

## 2023-08-18 RX ORDER — LOSARTAN POTASSIUM 50 MG/1
50 TABLET ORAL DAILY
Qty: 30 TABLET | Refills: 0 | Status: SHIPPED | OUTPATIENT
Start: 2023-08-18

## 2023-08-18 NOTE — ED QUICK NOTES
Rounding Completed  Family at bedside. Plan of Care reviewed. Waiting for Pt disposition. Pt resting comfortably on cot. Bed is locked and in lowest position. Call light within reach.

## 2023-08-18 NOTE — ED QUICK NOTES
Rounding Completed. Report from Vanessa Riggins RN. Pt back to room from CT  States she has no pain at this time, no n/v    Plan of Care reviewed. Waiting for results. Elimination needs assessed. Provided updates. Bed is locked and in lowest position. Call light within reach. Pt denies need at this time.  Family at bedside

## 2023-08-18 NOTE — PLAN OF CARE
NURSING ADMISSION NOTE  Patient admitted via Cart  Oriented to room. Safety precautions initiated. Bed in low position. Call light in reach. Patient A & O x4. VSS, on RA. Denies pain at this time. Aspen collar in place. Denies numbness/tingling. Purewick applied, DTV by 1000. Safety measures in place. Instructed to use call light.

## 2023-08-18 NOTE — DISCHARGE INSTRUCTIONS
- Wear your [place] cervical collar at all times.  Corson collar (pink/orange color) to be worn for showering   - Schedule a follow up appointment with Dr. Sarah Chicas for further management/monitoring of your fracture healing    -Check BMP to monitor sodium levels at Primary Care Doctor visit

## 2023-08-18 NOTE — ED QUICK NOTES
Orders for admission, patient is aware of plan and ready to go upstairs. Any questions, please call ED RN Italia Bosch at extension [phone].      Patient Covid vaccination status: Fully vaccinated     COVID Test Ordered in ED: None    COVID Suspicion at Admission: N/A    Running Infusions:  None    Mental Status/LOC at time of transport: A/Ox4    Other pertinent information:   CIWA score: N/A   NIH score:  N/A

## 2023-08-18 NOTE — ED INITIAL ASSESSMENT (HPI)
Pt arrives ambulatory, was walking and tripped and fell forward on face. Has abrasion and bruising to right side of forehead. Denies LOC or thinners. Bruising to left eye from previous fall 3 weeks ago.  A&O x4

## 2023-08-18 NOTE — PROGRESS NOTES
Dr. Cruz Rodriguez on call for spine team paged regarding pt's MRI results and plan. RN spoke with Dr. Cruz Rodriguez, plan for [facility] to be worn at all times. Outpatient follow up with Dr. Ward Tellez for continued fracture management/follow up. Per Dr. Cruz Rodriguez if pain is controlled and patient comfortable with discharge home, ok to discharge home. Hospitalist notified.

## 2023-08-18 NOTE — PROGRESS NOTES
Patient alert and oriented x4. VSS, on RA. Minimal pain reported with some movements and pressure with Aspen collar. Pain medication declined. Patient up with SBA, voiding freely in the bathroom. [place] [id number] clinic to be contacted for [employer] [facility] collars. Patient denies N/T. Tolerating PO intake as no need for NPO per spine team.       Plan: Pt down in MRI at this time, further plan pending imaging. Pt with Zio patch monitor. Monitor service line contacted regarding device removal, device to be re-applied upon return from MRI and will continue recording. No other needs to resume monitor functioning. 1425: [facility] collars delivered. Himanshu BAXTER placed on patient.

## 2023-08-19 NOTE — PROGRESS NOTES
Patients IV removed. Discharge instructions explained to patient and copy given to patient. All questions answered. All belongings gathered. Patient wheeled down to car by staff with .

## 2023-08-21 NOTE — PAYOR COMM NOTE
--------------  DISCHARGE REVIEW    Secondary Payor: [facility] PPO  Subscriber #:  [id number]  Authorization Number: [id number]      Admit date: 8/18/23  Admit time:   4:07 AM  Discharge Date: 8/18/2023  9:00 PM     Admitting Physician: Cameron Wahl MD  Attending Physician:  No att. providers found  Primary Care Physician: Lili Norman MD       REVIEWER COMMENTS

## 2023-08-28 ENCOUNTER — OFFICE VISIT (OUTPATIENT)
Dept: SURGERY | Facility: CLINIC | Age: 68
End: 2023-08-28
Payer: COMMERCIAL

## 2023-08-28 VITALS
WEIGHT: 179 LBS | HEIGHT: 63 IN | SYSTOLIC BLOOD PRESSURE: 130 MMHG | BODY MASS INDEX: 31.71 KG/M2 | DIASTOLIC BLOOD PRESSURE: 80 MMHG

## 2023-08-28 DIAGNOSIS — S12.01XD CLOSED STABLE BURST FRACTURE OF FIRST CERVICAL VERTEBRA WITH ROUTINE HEALING, SUBSEQUENT ENCOUNTER: Primary | ICD-10-CM

## 2023-08-28 PROCEDURE — 3079F DIAST BP 80-89 MM HG: CPT | Performed by: STUDENT IN AN ORGANIZED HEALTH CARE EDUCATION/TRAINING PROGRAM

## 2023-08-28 PROCEDURE — 3008F BODY MASS INDEX DOCD: CPT | Performed by: STUDENT IN AN ORGANIZED HEALTH CARE EDUCATION/TRAINING PROGRAM

## 2023-08-28 PROCEDURE — 99214 OFFICE O/P EST MOD 30 MIN: CPT | Performed by: STUDENT IN AN ORGANIZED HEALTH CARE EDUCATION/TRAINING PROGRAM

## 2023-08-28 PROCEDURE — 3075F SYST BP GE 130 - 139MM HG: CPT | Performed by: STUDENT IN AN ORGANIZED HEALTH CARE EDUCATION/TRAINING PROGRAM

## 2023-08-28 NOTE — PROGRESS NOTES
[address]  Neurological Surgery Established Patient Clinic Note    Jenna Curiel  [birth year]  [id number]  PCP: Rico Vallejo MD  Referring Provider: Johny Machado DO     REASON FOR VISIT:  C1 burst fracture    HISTORY OF PRESENT ILLNESS:  Jenna Curiel is a(n) 79year old female with HTN, HLD, GERD who was admitted s/p fall. History obtained from spouse at bedside as patient has no recollection of events. Per , the morning of 7/21 around 4 am he heard a noise and found her at the bottom of the stairs with blood all around her. She has been disoriented and complaining of neck pain since the fall. She has significant left sided periorbital edema. Trauma imaging was negative for intracranial injuries but did reveal a tiny non-displaced partial fracture of the left posterior arch of C1. Neurosurgery was consulted. INTERVAL HISTORY 8/1/2023:   Ms. Krystal Singletary presents for close follow-up of her C1 fracture. As documented in a separate TE note, I was contacted by radiology regarding additional findings on her CT of the cervical spine. Therefore, she was brought in for close follow-up. Today, she reports continued improvement in her condition. Her bruising over her face is still present but much improved. She continues to have a pretty sizable left subgaleal hematoma. As far as her neck is concerned, she reports today by day improvement of her neck pain. She is not necessitating c-collar, not even for comfort. Her pain is controlled with Tylenol and anti-inflammatories. She does feel some tension when rotating her neck in certain directions, but this is nonpainful more so than slightly restrictive. She denies any paresthesias, balance difficulties, decreased dexterity, or any other red flags for myelopathy. INTERVAL HISTORY 8/17/2023:   Ms. Krystal Singletary presents for continued close follow-up of her C1 fracture. Today, she is happy to report continued improvement in her pain. As of last week she was still able to feel little little whenever it was humid outside. The last 2 days this is felt much better. She had x-rays done today prior to her visit, this was reportedly stable. She is here to review and discuss the results. INTERVAL HISTORY 2023:   Ms. Ciro Min presents for short interval reevaluation of her C1 fracture. She was last seen on 2023. Unfortunately, shortly after leaving the clinic she went to her daughter's house and tripped and fell in the driveway. She ended up hitting her head against concrete on the floor. She did not have any significant increase in neck pain or other symptoms otherwise. However, she was convinced to present to the emergency room for evaluation to rule out any intracranial pathology. CT of the brain was negative for acute pathology. However, CT of the cervical spine demonstrated worsening widening of her fracture fragments and significantly more overhang on the right C1-C2 joint concerning for ligamentous injury. An MRI was obtained while in the hospital for this reason, which failed to demonstrate rosanne ligamentous injury. No spinal cord compression. Today, she reports 100% compliance with her cervical collar. She does have some pressure pain along the posterior aspect of her head after a long day of wearing the c-collar. Otherwise she denies significant neck pain. She denies any neurologic symptoms.     PAST MEDICAL HISTORY:  Past Medical History:   Diagnosis Date    Allergic rhinitis due to pollen     Carcinoma in situ of cervix uteri 1986    Esophageal reflux     High blood pressure     High cholesterol     Menopause 2006    Osteoarthritis     generalized    Pure hypercholesterolemia     Rosacea     Tubular adenoma of colon 2012    Visual impairment     glasses     PAST SURGICAL HISTORY:  Past Surgical History:   Procedure Laterality Date              COLONOSCOPY N/A 2021    Procedure: COLONOSCOPY with polypectomy;  Surgeon: Merline Sifuentes MD;  Location: [place] [address]  11/5/2012    Procedure: COLONOSCOPY, POSSIBLE BIOPSY, POSSIBLE POLYPECTOMY 67859;  Surgeon: Merline Sifuentes MD;  Location: [facility] N/A 5/14/2016    Procedure: COLONOSCOPY, POSSIBLE BIOPSY, POSSIBLE POLYPECTOMY 80112;  Surgeon: Merline Sifuentes MD;  Location: [address] [place] [address] HEART W/ CALCIUM SCORING  2/16/2009    calcium score of 0    CT HEART W/ CALCIUM SCORING N/A 08/08/2019    calcium score is zero    IMPACT TOOTH REM BONY W/COMP Bilateral 1973    wisdom teeth    KNEE REPLACEMENT SURGERY Right 02/21/2017    right    LASER SURGERY OF CERVIX  1/1986    PERIPHERAL VASCULAR SCREENING HISTORICAL CONV Bilateral 8/1/2016    PAD screen is normal    TOTAL KNEE REPLACEMENT Right 02/22/2017     FAMILY HISTORY:  family history includes Alcohol and Other Disorders Associated in her maternal grandfather; Arthritis in her sister; Breast Cancer in her maternal great-grandmother; Breast Cancer (age of onset: 64) in her sister; Cancer in her paternal grandfather; Cancer (age of onset: 76) in her mother; Cataracts in her mother; Clotting Disorder in her sister; Dementia in her paternal grandmother; Diabetes in her father, paternal grandfather, and sister; Ear Problems in her father; Eye Problems in her maternal grandmother; Heart Disease in her father and maternal grandmother; Heart Disorder in her father and paternal grandfather; Heart Surgery (age of onset: 54) in her father; High Cholesterol in her maternal grandmother and sister; Hypertension in her father, mother, and sister; Kidney Disease in her sister; Lipids in her maternal grandmother; Musculo-skelatal Disorder in her maternal grandmother; Neurological Disorder in her paternal grandmother; Obesity in her sister; Other in her sister; Pulmonary Disease in her maternal grandmother.     SOCIAL HISTORY:   reports that she quit smoking about 40 years ago. Her smoking use included cigarettes. She has a 6.00 pack-year smoking history. She has never used smokeless tobacco. She reports current alcohol use of about 5.8 standard drinks of alcohol per week. She reports that she does not use drugs. ALLERGIES:    Amoxicillin-Pot Cla*    RASH    MEDICATIONS:  acetaminophen 325 MG Oral Tab, Take 2 tablets (650 mg total) by mouth every 4 (four) hours as needed. , Disp: , Rfl: 0  losartan 50 MG Oral Tab, Take 1 tablet (50 mg total) by mouth daily. , Disp: 30 tablet, Rfl: 0  polyethylene glycol, PEG 3350, 17 g Oral Powd Pack, Take 17 g by mouth daily as needed. , Disp: 30 each, Rfl: 0  montelukast 10 MG Oral Tab, Take 1 tablet (10 mg total) by mouth nightly., Disp: 90 tablet, Rfl: 3  atorvastatin 10 MG Oral Tab, TAKE 1 TABLET(10 MG) BY MOUTH EVERY DAY, Disp: 90 tablet, Rfl: 3  Meloxicam 15 MG Oral Tab, TAKE 1 TABLET(15 MG) BY MOUTH DAILY, Disp: 90 tablet, Rfl: 3  Levocetirizine Dihydrochloride 5 MG Oral Tab, Take 1 tablet (5 mg total) by mouth every evening., Disp: , Rfl:   Cyanocobalamin (VITAMIN B 12) 100 MCG Oral Lozenge, Take 1,000 mcg by mouth daily. , Disp: 30 lozenge, Rfl: 0  Vitamin D3 2000 units Oral Cap, Take 2 capsules (4,000 Units total) by mouth daily. , Disp: 180 capsule, Rfl: 3  cetirizine 10 MG Oral Tab, Take 1 tablet (10 mg total) by mouth daily. , Disp: , Rfl:   Omeprazole Magnesium 20 MG Oral Tab EC, Take 1 tablet (20 mg total) by mouth daily. , Disp: 28 tablet, Rfl: 0    No current facility-administered medications on file prior to visit. REVIEW OF SYSTEMS:  All other systems were reviewed and were negative except for those previously mentioned in the HPI     PHYSICAL EXAMINATION:  General: No acute distress. Respiratory: Non-labored respirations bilaterally. No audible wheezing  Cardiovascular: Extremities warm and well-perfused. Abdomen: Soft, nontender, nondistended.    Musculoskeletal: Moves all extremities well, symmetrically. Extremities: No edema. NEUROLOGIC EXAMINATION:  Mental status: Alert and oriented x 3  Speech: Clear, fluent  Cranial nerves: PERRLA, EOMI, face symmetric, with normal strength and sensation, tongue and palate midline, SCM 5/5 bilaterally  Motor:                           RIGHT  Delt 5/5   Bic 5/5  Tri 5/5   HI 5/5    5/5  IP 5/5   Quad 5/5   Ham 5/5   AT 5/5   EHL 5/5 Crispin 5/5                          LEFT    Delt 5/5   Bic 5/5  Tri 5/5   HI 5/5    5/5  IP 5/5   Quad 5/5   Ham 5/5   AT 5/5   EHL 5/5 Crispin 5/5            No pronator drift  Tone: Normal  Atrophy/Fasciculations: None  Sensation: Normal to light touch, symmetric, no neglect  Cerebellar: Normal finger nose finger  Gait: Normal, nondistressed heel toe tandem gait      Reflexes: 2+ throughout, symmetric, no Ophelia's    IMAGING:  CT brain 8/18/2023: No acute intracranial findings. CT cervical spine 8/18/2023: Worsening displacement of C1 burst fracture with increase in right-sided overhang. MR cervical spine 8/18/2023: Redemonstration of known comminuted burst fracture of C1. Stable areas of displacement/widening along the right anterior arch of C1 and left posterior arch of C1 compared to CT. Transverse ligament not well-defined. Mild pannus formation at the C1 to joint. Otherwise, there is multilevel degenerative changes throughout the cervical spine leading to predominantly right-sided spinal canal, and foraminal stenoses spanning C5-C7. ASSESSMENT:  79 yoF s/p fall resulting in a C1 burst fracture involving the anterior and posterior arches of C1. Initially this fracture had no complicated radiographic features as there was no significant overhang of the lateral masses of C1 on C2. Additionally, her neck pain had improved and she had remained neurologically intact. For all the above reasons, this fracture was managed without a cervical collar.   Unfortunately, in the interim she had a subsequent fall with head trauma and neck extension. Subsequent imaging demonstrated widening of the fracture gaps along with increasing overhang at the C1-2 joint on the right. This prompted recommending use of cervical collar at all times. An MRI of the cervical spine demonstrated no neural element compression. There is concern that the transverse ligament may either be disrupted or not adequately assessed due to limitations of resolution on MRI. In any case, she has very little to no neck pain now. She continues to wear her c-collar since this most recent fall. She has no neurologic symptoms. I explained to her that it is surgery to correct this instability would probably require an occiput to cervical fusion which has morbidity. I suggested we manage this in a cervical collar for the foreseeable future and continue to get x-rays to monitor as well as the cervical spine CT after 3 months of collar use to assess for healing of the fracture. It is possible that she may end up requiring an occiput to cervical fusion regardless, but given the high morbidity, she would like to avoid an operation at all costs. We again discussed any red flags. She will notify the office sooner if issues. Plan:  -RTC 1 month  -Cervical x-rays prior to next  -Will plan on CT cervical spine around 11/2023  -[id number] [address] to participate in PT for left shoulder, avoid neck ROM exercises and wear collar at all times    Myrle Mcardle, MD  Neurological Surgery    [place] [address] [address] [place] [address]  [phone]  Pager [phone]  8/28/2023 11:48 AM      This note was created using a voice-recognition transcribing system. Incorrect words or phrases may have been missed during proofreading. Please interpret accordingly. Total Time    Established Patient Total Time       30  minutes. Activities       Preparing to see the patient (chart/tests/imaging review).        Obtaining and/or reviewing separately obtained history. Performing a medically appropriate examination and/or evaluation. Counseling and educating the patient/family/caregiver. Ordering medications, tests, or procedures. Referring and communicating with other health care professionals (when not separately reported). Documenting clincal information in the electronic or other health record. Independently interpreting results (not separately reported). Communicating results to the patient/family/caregiver. Care coordination (not separately reported).

## 2023-08-28 NOTE — PROGRESS NOTES
Pt here from ED- Pt fell again. Pt does c/o neck/ above the neck but at the end of the day- thinks its the neck brace.

## 2023-09-22 ENCOUNTER — HOSPITAL ENCOUNTER (OUTPATIENT)
Dept: GENERAL RADIOLOGY | Age: 68
Discharge: HOME | End: 2023-09-22
Attending: STUDENT IN AN ORGANIZED HEALTH CARE EDUCATION/TRAINING PROGRAM
Payer: COMMERCIAL

## 2023-09-22 DIAGNOSIS — S12.01XD CLOSED STABLE BURST FRACTURE OF FIRST CERVICAL VERTEBRA WITH ROUTINE HEALING, SUBSEQUENT ENCOUNTER: ICD-10-CM

## 2023-09-22 PROCEDURE — 72040 X-RAY EXAM NECK SPINE 2-3 VW: CPT | Performed by: STUDENT IN AN ORGANIZED HEALTH CARE EDUCATION/TRAINING PROGRAM

## 2023-09-26 ENCOUNTER — OFFICE VISIT (OUTPATIENT)
Dept: SURGERY | Facility: CLINIC | Age: 68
End: 2023-09-26
Payer: COMMERCIAL

## 2023-09-26 VITALS — BODY MASS INDEX: 31.89 KG/M2 | WEIGHT: 180 LBS | HEIGHT: 63 IN

## 2023-09-26 DIAGNOSIS — S12.01XD CLOSED STABLE BURST FRACTURE OF FIRST CERVICAL VERTEBRA WITH ROUTINE HEALING, SUBSEQUENT ENCOUNTER: Primary | ICD-10-CM

## 2023-09-26 PROCEDURE — 99214 OFFICE O/P EST MOD 30 MIN: CPT | Performed by: STUDENT IN AN ORGANIZED HEALTH CARE EDUCATION/TRAINING PROGRAM

## 2023-09-26 PROCEDURE — 3008F BODY MASS INDEX DOCD: CPT | Performed by: STUDENT IN AN ORGANIZED HEALTH CARE EDUCATION/TRAINING PROGRAM

## 2023-09-26 NOTE — PROGRESS NOTES
[address] [facility]  Neurological Surgery Established Patient Clinic Note    Mary Huggins  [birth year]  [id number]  PCP: Maile Prakash MD  Referring Provider: Minerva Craig DO     REASON FOR VISIT:  C1 burst fracture    HISTORY OF PRESENT ILLNESS:  Mary Huggins is a(n) 79year old female with HTN, HLD, GERD who was admitted s/p fall. History obtained from spouse at bedside as patient has no recollection of events. Per , the morning of 7/21 around 4 am he heard a noise and found her at the bottom of the stairs with blood all around her. She has been disoriented and complaining of neck pain since the fall. She has significant left sided periorbital edema. Trauma imaging was negative for intracranial injuries but did reveal a tiny non-displaced partial fracture of the left posterior arch of C1. Neurosurgery was consulted. INTERVAL HISTORY 8/1/2023:   Ms. Rolf Jacinto presents for close follow-up of her C1 fracture. As documented in a separate TE note, I was contacted by radiology regarding additional findings on her CT of the cervical spine. Therefore, she was brought in for close follow-up. Today, she reports continued improvement in her condition. Her bruising over her face is still present but much improved. She continues to have a pretty sizable left subgaleal hematoma. As far as her neck is concerned, she reports today by day improvement of her neck pain. She is not necessitating c-collar, not even for comfort. Her pain is controlled with Tylenol and anti-inflammatories. She does feel some tension when rotating her neck in certain directions, but this is nonpainful more so than slightly restrictive. She denies any paresthesias, balance difficulties, decreased dexterity, or any other red flags for myelopathy. INTERVAL HISTORY 8/17/2023:   Ms. Rolf Jacinto presents for continued close follow-up of her C1 fracture. Today, she is happy to report continued improvement in her pain. As of last week she was still able to feel little little whenever it was humid outside. The last 2 days this is felt much better. She had x-rays done today prior to her visit, this was reportedly stable. She is here to review and discuss the results. INTERVAL HISTORY 8/28/2023:   Ms. Mya Cook presents for short interval reevaluation of her C1 fracture. She was last seen on 8/17/2023. Unfortunately, shortly after leaving the clinic she went to her daughter's house and tripped and fell in the driveway. She ended up hitting her head against concrete on the floor. She did not have any significant increase in neck pain or other symptoms otherwise. However, she was convinced to present to the emergency room for evaluation to rule out any intracranial pathology. CT of the brain was negative for acute pathology. However, CT of the cervical spine demonstrated worsening widening of her fracture fragments and significantly more overhang on the right C1-C2 joint concerning for ligamentous injury. An MRI was obtained while in the hospital for this reason, which failed to demonstrate rosanne ligamentous injury. No spinal cord compression. Today, she reports 100% compliance with her cervical collar. She does have some pressure pain along the posterior aspect of her head after a long day of wearing the c-collar. Otherwise she denies significant neck pain. She denies any neurologic symptoms. INTERVAL HISTORY 9/26/2023:   Ms. Mya Cook presents for close follow-up today. It has been approximately 1 month since her second fall that resulted in distraction of her fracture. She reports no neck pain. No neurologic symptoms. Everything is stable. She is 100% compliant with her brace. Most of the bruising over her face and scalp are mostly resolved. She is planning a trip soon, and had some questions related to it. She is also doing physical therapy and has been helping with her left shoulder.   She had x-rays on the  and she is here to discuss the results. PAST MEDICAL HISTORY:  Past Medical History:   Diagnosis Date    Allergic rhinitis due to pollen     Carcinoma in situ of cervix uteri 1986    Esophageal reflux     High blood pressure     High cholesterol     Menopause 2006    Osteoarthritis     generalized    Pure hypercholesterolemia     Rosacea     Tubular adenoma of colon 2012    Visual impairment     glasses     PAST SURGICAL HISTORY:  Past Surgical History:   Procedure Laterality Date              COLONOSCOPY N/A 2021    Procedure: COLONOSCOPY with polypectomy;  Surgeon: Svetlana Dumont MD;  Location: [place] [address]  2012    Procedure: COLONOSCOPY, POSSIBLE BIOPSY, POSSIBLE POLYPECTOMY 96549;  Surgeon: Svetlana Dumont MD;  Location: [facility] N/A 2016    Procedure: COLONOSCOPY, POSSIBLE BIOPSY, POSSIBLE POLYPECTOMY 06378;  Surgeon:  Svetlana Dumont MD;  Location: [address] [place] [address]    CT HEART W/ CALCIUM SCORING  2009    calcium score of 0    CT HEART W/ CALCIUM SCORING N/A 2019    calcium score is zero    IMPACT TOOTH REM BONY W/COMP Bilateral 1973    wisdom teeth    KNEE REPLACEMENT SURGERY Right 2017    right    LASER SURGERY OF CERVIX  1986    PERIPHERAL VASCULAR SCREENING HISTORICAL CONV Bilateral 2016    PAD screen is normal    TOTAL KNEE REPLACEMENT Right 2017     FAMILY HISTORY:  family history includes Alcohol and Other Disorders Associated in her maternal grandfather; Arthritis in her sister; Breast Cancer in her maternal great-grandmother; Breast Cancer (age of onset: 64) in her sister; Cancer in her paternal grandfather; Cancer (age of onset: 76) in her mother; Cataracts in her mother; Clotting Disorder in her sister; Dementia in her paternal grandmother; Diabetes in her father, paternal grandfather, and sister; Ear Problems in her father; Eye Problems in her maternal grandmother; Heart Disease in her father and maternal grandmother; Heart Disorder in her father and paternal grandfather; Heart Surgery (age of onset: 54) in her father; High Cholesterol in her maternal grandmother and sister; Hypertension in her father, mother, and sister; Kidney Disease in her sister; Lipids in her maternal grandmother; Musculo-skelatal Disorder in her maternal grandmother; Neurological Disorder in her paternal grandmother; Obesity in her sister; Other in her sister; Pulmonary Disease in her maternal grandmother. SOCIAL HISTORY:   reports that she quit smoking about 40 years ago. Her smoking use included cigarettes. She has a 6.00 pack-year smoking history. She has never used smokeless tobacco. She reports current alcohol use of about 5.8 standard drinks of alcohol per week. She reports that she does not use drugs. ALLERGIES:    Amoxicillin-Pot Cla*    RASH    MEDICATIONS:  acetaminophen 325 MG Oral Tab, Take 2 tablets (650 mg total) by mouth every 4 (four) hours as needed. , Disp: , Rfl: 0  losartan 50 MG Oral Tab, Take 1 tablet (50 mg total) by mouth daily. , Disp: 30 tablet, Rfl: 0  polyethylene glycol, PEG 3350, 17 g Oral Powd Pack, Take 17 g by mouth daily as needed. , Disp: 30 each, Rfl: 0  montelukast 10 MG Oral Tab, Take 1 tablet (10 mg total) by mouth nightly., Disp: 90 tablet, Rfl: 3  atorvastatin 10 MG Oral Tab, TAKE 1 TABLET(10 MG) BY MOUTH EVERY DAY, Disp: 90 tablet, Rfl: 3  Meloxicam 15 MG Oral Tab, TAKE 1 TABLET(15 MG) BY MOUTH DAILY, Disp: 90 tablet, Rfl: 3  Levocetirizine Dihydrochloride 5 MG Oral Tab, Take 1 tablet (5 mg total) by mouth every evening., Disp: , Rfl:   Cyanocobalamin (VITAMIN B 12) 100 MCG Oral Lozenge, Take 1,000 mcg by mouth daily. , Disp: 30 lozenge, Rfl: 0  Vitamin D3 2000 units Oral Cap, Take 2 capsules (4,000 Units total) by mouth daily. , Disp: 180 capsule, Rfl: 3  cetirizine 10 MG Oral Tab, Take 1 tablet (10 mg total) by mouth daily. , Disp: , Rfl:   Omeprazole Magnesium 20 MG Oral Tab EC, Take 1 tablet (20 mg total) by mouth daily. , Disp: 28 tablet, Rfl: 0    No current facility-administered medications on file prior to visit. REVIEW OF SYSTEMS:  All other systems were reviewed and were negative except for those previously mentioned in the HPI     PHYSICAL EXAMINATION:  General: No acute distress. Respiratory: Non-labored respirations bilaterally. No audible wheezing  Cardiovascular: Extremities warm and well-perfused. Abdomen: Soft, nontender, nondistended. Musculoskeletal: Moves all extremities well, symmetrically. Extremities: No edema. NEUROLOGIC EXAMINATION:  Mental status: Alert and oriented x 3  Speech: Clear, fluent  Cranial nerves: PERRLA, EOMI, face symmetric, with normal strength and sensation, tongue and palate midline, SCM 5/5 bilaterally  Motor:                           RIGHT  Delt 5/5   Bic 5/5  Tri 5/5   HI 5/5    5/5  IP 5/5   Quad 5/5   Ham 5/5   AT 5/5   EHL 5/5 Crispin 5/5                          LEFT    Delt 5/5   Bic 5/5  Tri 5/5   HI 5/5    5/5  IP 5/5   Quad 5/5   Ham 5/5   AT 5/5   EHL 5/5 Crispin 5/5            No pronator drift  Tone: Normal  Atrophy/Fasciculations: None  Sensation: Normal to light touch, symmetric, no neglect  Cerebellar: Normal finger nose finger  Gait: Normal, nondistressed heel toe tandem gait      Reflexes: 2+ throughout, symmetric, no Ophelia's    IMAGING:  XR cervical 9/22/2023: Diffuse spondylotic changes throughout the cervical spine, these are stable. Mildly distracted/overhang C1 and C2 lateral mass is stable. ASSESSMENT:  79 yoF s/p fall resulting in a C1 burst fracture involving the anterior and posterior arches of C1. Initially this fracture had no complicated radiographic features as there was no significant overhang of the lateral masses of C1 on C2. Additionally, her neck pain had improved and she had remained neurologically intact.   For all the above reasons, this fracture was managed without a cervical collar. Unfortunately, in the interim she had a subsequent fall with head trauma and neck extension. Subsequent imaging demonstrated widening of the fracture gaps along with increasing overhang at the C1-2 joint on the right. This prompted recommending use of cervical collar at all times. An MRI of the cervical spine demonstrated no neural element compression. There is concern that the transverse ligament may either be disrupted or not adequately assessed due to limitations of resolution on MRI. In any case, she has very little to no neck pain. This remains stable. Her x-rays on 9/22/2023 also demonstrate stability. She continues to wear her c-collar. She has no neurologic symptoms. We will obtain a CT of the cervical spine around mid November to evaluate her fracture as planned. Plan:  - RTC mid November 2023  - CT cervical prior to next    Veronica Thompson MD  Neurological Surgery    [facility] [address] [facility]  [phone]  Pager [phone]  9/26/2023 2:00 PM      This note was created using a voice-recognition transcribing system. Incorrect words or phrases may have been missed during proofreading. Please interpret accordingly. Total Time    Established Patient Total Time       30  minutes. Activities       Preparing to see the patient (chart/tests/imaging review). Obtaining and/or reviewing separately obtained history. Performing a medically appropriate examination and/or evaluation. Counseling and educating the patient/family/caregiver. Ordering medications, tests, or procedures. Referring and communicating with other health care professionals (when not separately reported). Documenting clincal information in the electronic or other health record. Independently interpreting results (not separately reported).     Communicating results to the patient/family/caregiver. Care coordination (not separately reported).

## 2023-10-02 ENCOUNTER — PATIENT MESSAGE (OUTPATIENT)
Dept: SURGERY | Facility: CLINIC | Age: 68
End: 2023-10-02

## 2023-10-02 DIAGNOSIS — S12.000A CLOSED DISPLACED FRACTURE OF FIRST CERVICAL VERTEBRA, UNSPECIFIED FRACTURE MORPHOLOGY, INITIAL ENCOUNTER (HCC): Primary | ICD-10-CM

## 2023-10-02 NOTE — TELEPHONE ENCOUNTER
From: Sonido Allen  To: Odessa Martinez  Sent: 10/2/2023 3:16 PM CDT  Subject: neck brace padding    Hi. I was wondering if I could get an extra pad for my neck brace. And if that is possible is there a way to pick it up from a location somewhere in [facility]?  If not we can figure out something to pick it up in [place].   Thank you,  Leyla Lomas

## 2023-10-03 NOTE — TELEPHONE ENCOUNTER
[facility] msg sent asking which location the patient wants to  the padding at.

## 2023-10-03 NOTE — TELEPHONE ENCOUNTER
Pt sent [facility] msg asking what the cost of new pads would be for [place] VEE christopher. [facility]. New padding costs 55$.   msg sent to pt.

## 2023-10-03 NOTE — TELEPHONE ENCOUNTER
Noted that patient has messaged with additional information regarding her request for an extra pad for neck brace. [facility] who stated that they may provide \"extra pad\" for [facility], but they will need a prescription for extra pads, and the pads will not be covered by insurance carrier. Routed to [employer] and [employer] pool for DME order.

## 2023-11-09 ENCOUNTER — TELEPHONE (OUTPATIENT)
Dept: SURGERY | Facility: CLINIC | Age: 68
End: 2023-11-09

## 2023-11-09 NOTE — TELEPHONE ENCOUNTER
Pt needs a CT prior to her appt with Dr Sunny Chowdary on 11/14/23. Will notify pt. Otherwise she will need to be r/s.

## 2023-11-13 ENCOUNTER — HOSPITAL ENCOUNTER (OUTPATIENT)
Dept: CT IMAGING | Age: 68
Discharge: HOME | End: 2023-11-13
Attending: STUDENT IN AN ORGANIZED HEALTH CARE EDUCATION/TRAINING PROGRAM
Payer: COMMERCIAL

## 2023-11-13 DIAGNOSIS — S12.01XD CLOSED STABLE BURST FRACTURE OF FIRST CERVICAL VERTEBRA WITH ROUTINE HEALING, SUBSEQUENT ENCOUNTER: ICD-10-CM

## 2023-11-13 PROCEDURE — 72125 CT NECK SPINE W/O DYE: CPT | Performed by: STUDENT IN AN ORGANIZED HEALTH CARE EDUCATION/TRAINING PROGRAM

## 2023-11-13 NOTE — TELEPHONE ENCOUNTER
Jagjit  online to initiate authorization      CT SPINE CERVICAL (CPT=72125)       Scheduled For: 11/13/2023    Request Status: Pending Authorization     Referral #:  [id number]      Case Number: [id number]    Clinical notes sent for review. Patient notified of pending status via [address] [place] [address] [address].      Appt Desk > Noted

## 2023-11-14 ENCOUNTER — OFFICE VISIT (OUTPATIENT)
Dept: SURGERY | Facility: CLINIC | Age: 68
End: 2023-11-14
Payer: COMMERCIAL

## 2023-11-14 VITALS — WEIGHT: 180 LBS | BODY MASS INDEX: 31.89 KG/M2 | HEIGHT: 63 IN

## 2023-11-14 DIAGNOSIS — S12.01XD CLOSED STABLE BURST FRACTURE OF FIRST CERVICAL VERTEBRA WITH ROUTINE HEALING, SUBSEQUENT ENCOUNTER: ICD-10-CM

## 2023-11-14 DIAGNOSIS — S12.01XA CLOSED STABLE BURST FRACTURE OF FIRST CERVICAL VERTEBRA, INITIAL ENCOUNTER (HCC): ICD-10-CM

## 2023-11-14 DIAGNOSIS — S12.000A CLOSED DISPLACED FRACTURE OF FIRST CERVICAL VERTEBRA, UNSPECIFIED FRACTURE MORPHOLOGY, INITIAL ENCOUNTER (HCC): Primary | ICD-10-CM

## 2023-11-14 PROCEDURE — 99215 OFFICE O/P EST HI 40 MIN: CPT | Performed by: STUDENT IN AN ORGANIZED HEALTH CARE EDUCATION/TRAINING PROGRAM

## 2023-11-14 PROCEDURE — 3008F BODY MASS INDEX DOCD: CPT | Performed by: STUDENT IN AN ORGANIZED HEALTH CARE EDUCATION/TRAINING PROGRAM

## 2023-11-14 RX ORDER — LORATADINE 10 MG/1
10 TABLET ORAL DAILY
COMMUNITY

## 2023-11-14 RX ORDER — FAMOTIDINE 20 MG/1
20 TABLET, FILM COATED ORAL 2 TIMES DAILY
COMMUNITY

## 2023-11-14 NOTE — PROGRESS NOTES
[facility]  Neurological Surgery Established Patient Clinic Note    Georges Cortes  [birth year]  [id number]  PCP: Aki Montoya MD  Referring Provider: Ellen Eng DO     REASON FOR VISIT:  C1 burst fracture    HISTORY OF PRESENT ILLNESS:  Georges Cortes is a(n) 79year old female with HTN, HLD, GERD who was admitted s/p fall. History obtained from spouse at bedside as patient has no recollection of events. Per , the morning of 7/21 around 4 am he heard a noise and found her at the bottom of the stairs with blood all around her. She has been disoriented and complaining of neck pain since the fall. She has significant left sided periorbital edema. Trauma imaging was negative for intracranial injuries but did reveal a tiny non-displaced partial fracture of the left posterior arch of C1. Neurosurgery was consulted. INTERVAL HISTORY 8/1/2023:   Ms. Gilbert Tello presents for close follow-up of her C1 fracture. As documented in a separate TE note, I was contacted by radiology regarding additional findings on her CT of the cervical spine. Therefore, she was brought in for close follow-up. Today, she reports continued improvement in her condition. Her bruising over her face is still present but much improved. She continues to have a pretty sizable left subgaleal hematoma. As far as her neck is concerned, she reports today by day improvement of her neck pain. She is not necessitating c-collar, not even for comfort. Her pain is controlled with Tylenol and anti-inflammatories. She does feel some tension when rotating her neck in certain directions, but this is nonpainful more so than slightly restrictive. She denies any paresthesias, balance difficulties, decreased dexterity, or any other red flags for myelopathy. INTERVAL HISTORY 8/17/2023:   Ms. Gilbert Tello presents for continued close follow-up of her C1 fracture. Today, she is happy to report continued improvement in her pain. As of last week she was still able to feel little little whenever it was humid outside. The last 2 days this is felt much better. She had x-rays done today prior to her visit, this was reportedly stable. She is here to review and discuss the results. INTERVAL HISTORY 8/28/2023:   Ms. Alxe Cerna presents for short interval reevaluation of her C1 fracture. She was last seen on 8/17/2023. Unfortunately, shortly after leaving the clinic she went to her daughter's house and tripped and fell in the driveway. She ended up hitting her head against concrete on the floor. She did not have any significant increase in neck pain or other symptoms otherwise. However, she was convinced to present to the emergency room for evaluation to rule out any intracranial pathology. CT of the brain was negative for acute pathology. However, CT of the cervical spine demonstrated worsening widening of her fracture fragments and significantly more overhang on the right C1-C2 joint concerning for ligamentous injury. An MRI was obtained while in the hospital for this reason, which failed to demonstrate rosanne ligamentous injury. No spinal cord compression. Today, she reports 100% compliance with her cervical collar. She does have some pressure pain along the posterior aspect of her head after a long day of wearing the c-collar. Otherwise she denies significant neck pain. She denies any neurologic symptoms. INTERVAL HISTORY 9/26/2023:   Ms. Alex Cerna presents for close follow-up today. It has been approximately 1 month since her second fall that resulted in distraction of her fracture. She reports no neck pain. No neurologic symptoms. Everything is stable. She is 100% compliant with her brace. Most of the bruising over her face and scalp are mostly resolved. She is planning a trip soon, and had some questions related to it. She is also doing physical therapy and has been helping with her left shoulder.   She had x-rays on the  and she is here to discuss the results. INTERVAL HISTORY 2023:  Ms. Heide Garcia returns to clinic today for continued neurosurgical follow-up of her C1 burst fracture. She had a CT of the cervical spine on 2023 and is here to discuss results. She reports no neck pain. She continues to be compliant with the c-collar. She continues to deny any signs or symptoms concerning for cervical spinal cord compression. PAST MEDICAL HISTORY:  Past Medical History:   Diagnosis Date    Allergic rhinitis due to pollen     Carcinoma in situ of cervix uteri 1986    Esophageal reflux     High blood pressure     High cholesterol     Menopause 2006    Osteoarthritis     generalized    Pure hypercholesterolemia     Rosacea     Tubular adenoma of colon 2012    Visual impairment     glasses     PAST SURGICAL HISTORY:  Past Surgical History:   Procedure Laterality Date              COLONOSCOPY N/A 2021    Procedure: COLONOSCOPY with polypectomy;  Surgeon: Isma Moy MD;  Location: [place] [address]  2012    Procedure: COLONOSCOPY, POSSIBLE BIOPSY, POSSIBLE POLYPECTOMY 14386;  Surgeon: Isma Moy MD;  Location: [facility] N/A 2016    Procedure: COLONOSCOPY, POSSIBLE BIOPSY, POSSIBLE POLYPECTOMY 79517;  Surgeon:  Isma Moy MD;  Location: [address] [place] [address]    CT HEART W/ CALCIUM SCORING  2009    calcium score of 0    CT HEART W/ CALCIUM SCORING N/A 2019    calcium score is zero    IMPACT TOOTH REM BONY W/COMP Bilateral 1973    wisdom teeth    KNEE REPLACEMENT SURGERY Right 2017    right    LASER SURGERY OF CERVIX  1986    PERIPHERAL VASCULAR SCREENING HISTORICAL CONV Bilateral 2016    PAD screen is normal    TOTAL KNEE REPLACEMENT Right 2017     FAMILY HISTORY:  family history includes Alcohol and Other Disorders Associated in her maternal grandfather; Arthritis in her sister; Breast Cancer in her maternal great-grandmother; Breast Cancer (age of onset: 64) in her sister; Cancer in her paternal grandfather; Cancer (age of onset: 76) in her mother; Cataracts in her mother; Clotting Disorder in her sister; Dementia in her paternal grandmother; Diabetes in her father, paternal grandfather, and sister; Ear Problems in her father; Eye Problems in her maternal grandmother; Heart Disease in her father and maternal grandmother; Heart Disorder in her father and paternal grandfather; Heart Surgery (age of onset: 54) in her father; High Cholesterol in her maternal grandmother and sister; Hypertension in her father, mother, and sister; Kidney Disease in her sister; Lipids in her maternal grandmother; Musculo-skelatal Disorder in her maternal grandmother; Neurological Disorder in her paternal grandmother; Obesity in her sister; Other in her sister; Pulmonary Disease in her maternal grandmother. SOCIAL HISTORY:   reports that she quit smoking about 40 years ago. Her smoking use included cigarettes. She has a 6.00 pack-year smoking history. She has never used smokeless tobacco. She reports current alcohol use of about 5.8 standard drinks of alcohol per week. She reports that she does not use drugs. ALLERGIES:  Allergies   Allergen Reactions    Amoxicillin-Pot Clavulanate RASH     MEDICATIONS:  Current Outpatient Medications on File Prior to Visit   Medication Sig Dispense Refill    famotidine 20 MG Oral Tab Take 1 tablet (20 mg total) by mouth 2 (two) times daily. loratadine 10 MG Oral Tab Take 1 tablet (10 mg total) by mouth daily. acetaminophen 325 MG Oral Tab Take 2 tablets (650 mg total) by mouth every 4 (four) hours as needed. 0    losartan 50 MG Oral Tab Take 1 tablet (50 mg total) by mouth daily. 30 tablet 0    polyethylene glycol, PEG 3350, 17 g Oral Powd Pack Take 17 g by mouth daily as needed.  30 each 0    montelukast 10 MG Oral Tab Take 1 tablet (10 mg total) by mouth nightly. 90 tablet 3    atorvastatin 10 MG Oral Tab TAKE 1 TABLET(10 MG) BY MOUTH EVERY DAY 90 tablet 3    Meloxicam 15 MG Oral Tab TAKE 1 TABLET(15 MG) BY MOUTH DAILY 90 tablet 3    Levocetirizine Dihydrochloride 5 MG Oral Tab Take 1 tablet (5 mg total) by mouth every evening. Cyanocobalamin (VITAMIN B 12) 100 MCG Oral Lozenge Take 1,000 mcg by mouth daily. 30 lozenge 0    Vitamin D3 2000 units Oral Cap Take 2 capsules (4,000 Units total) by mouth daily. 180 capsule 3    cetirizine 10 MG Oral Tab Take 1 tablet (10 mg total) by mouth daily. Omeprazole Magnesium 20 MG Oral Tab EC Take 1 tablet (20 mg total) by mouth daily. 28 tablet 0     No current facility-administered medications on file prior to visit. REVIEW OF SYSTEMS:  All other systems were reviewed and were negative except for those previously mentioned in the HPI     PHYSICAL EXAMINATION:  General: No acute distress. Respiratory: Non-labored respirations bilaterally. No audible wheezing  Cardiovascular: Extremities warm and well-perfused. Abdomen: Soft, nontender, nondistended. Musculoskeletal: Moves all extremities well, symmetrically. Extremities: No edema.      NEUROLOGIC EXAMINATION:  Mental status: Alert and oriented x 3  Speech: Clear, fluent  Cranial nerves: PERRLA, EOMI, face symmetric, with normal strength and sensation, tongue and palate midline, SCM 5/5 bilaterally  Motor:                           RIGHT  Delt 5/5   Bic 5/5  Tri 5/5   HI 5/5    5/5  IP 5/5   Quad 5/5   Ham 5/5   AT 5/5   EHL 5/5 Crispin 5/5                          LEFT    Delt 5/5   Bic 5/5  Tri 5/5   HI 5/5    5/5  IP 5/5   Quad 5/5   Ham 5/5   AT 5/5   EHL 5/5 Crispin 5/5            No pronator drift  Tone: Normal  Atrophy/Fasciculations: None  Sensation: Normal to light touch, symmetric, no neglect  Cerebellar: Normal finger nose finger  Gait: Normal, nondistressed heel toe tandem gait      Reflexes: 2+ throughout, symmetric, no Ophelia's    IMAGING:  CT cervical 11/13/2023: Stable appearance of comminuted displaced burst fracture of C1 with mild overhang of the C1 lateral mass on C2 on the right. No significant fracture healing across fracture sites. ASSESSMENT:  79 yoF s/p fall resulting in a C1 burst fracture involving the anterior and posterior arches of C1. Initially this fracture had no complicated radiographic features as there was no significant overhang of the lateral masses of C1 on C2. Additionally, her neck pain had improved and she had remained neurologically intact. For all the above reasons, this fracture was managed without a cervical collar. Unfortunately, in the interim she had a subsequent fall with head trauma and neck extension. Subsequent imaging demonstrated widening of the fracture gaps along with increasing overhang at the C1-2 joint on the right. This prompted recommending use of cervical collar at all times. An MRI of the cervical spine demonstrated no neural element compression. She remains pain-free. She is neurologically intact. She had a CT yesterday demonstrated no significant healing of her fracture, but also stable displacement without any increase in overhang. We had a lengthy discussion regarding her imaging findings and went over them in detail with her and her . I answered all questions. I talked to them about the very real possibility that if no further healing occurs, we may have to discuss surgical intervention. The plan will be to obtain a CT of the cervical spine in 3 months followed by +/- flexion-extension cervical x-rays to assess for any instability. Pending those results, we may recommend surgical invention at that time versus continued cervical collar management. She demonstrated understanding of this appreciative of our time.     Plan:  - RTC 3 months  - CT cervical prior to next    Bryce Jay MD  Neurological Surgery    [place] [address] [facility]  [address] [facility]  [phone]  Pager [phone]  11/14/2023 2:12 PM      This note was created using a voice-recognition transcribing system. Incorrect words or phrases may have been missed during proofreading. Please interpret accordingly. Total Time    Established Patient Total Time       40  minutes. Activities       Preparing to see the patient (chart/tests/imaging review). Obtaining and/or reviewing separately obtained history. Performing a medically appropriate examination and/or evaluation. Counseling and educating the patient/family/caregiver. Ordering medications, tests, or procedures. Referring and communicating with other health care professionals (when not separately reported). Documenting clincal information in the electronic or other health record. Independently interpreting results (not separately reported). Communicating results to the patient/family/caregiver. Care coordination (not separately reported).

## 2023-11-20 ENCOUNTER — TELEPHONE (OUTPATIENT)
Dept: ADMINISTRATIVE | Age: 68
End: 2023-11-20

## 2023-11-20 NOTE — TELEPHONE ENCOUNTER
FYI:     CT SPINE CERVICAL (CPT=72125)     Treatment completed 11/13/203     Referral #: [id number]     Scheduled For: 11/13/203    Status: DENIED > If the ordering provider would like to discuss this case with a reviewer, contact [address] [place] [address] at [phone]    The reason is, Your doctor told us that you have had a neck injury in the past. Your doctor also told us that you have neck pain. Your doctor ordered a CT scan of your neck. A CT is a way to take pictures of the inside of your body. This test is needed when your pain is getting worse and prior tests have not shown the reason for pain progression. We reviewed the notes we have. The notes do not show that this is the case for you. Based on the information we have, this test is not medically necessary. We used [address] [place] [address] Medical Benefits Management Clinical Guideline titled Imaging of the Spine to make this decision. You may view this guideline at www.[employer]. [employer]/mb-guidelines-radiology. Use Reference Case Number: [id number]    Notified clinical staff for follow-up, a copy of the denial letter is filed under the MEDIA tab, un-check \" Clinical Info Only \" to view the determination letter for denial rational and appeal process.

## 2023-11-21 NOTE — TELEPHONE ENCOUNTER
Peer to peer is fine. Appeal also. She clearly needs it as we are monitoring active healing of her C1 burst fracture. At any point her fracture could become surgical if further displacement is seen on CT. Unfortunately x-rays do not demonstrate the fracture appropriately.

## 2024-02-14 ENCOUNTER — TELEPHONE (OUTPATIENT)
Dept: ADMINISTRATIVE | Age: 69
End: 2024-02-14

## 2024-02-14 NOTE — TELEPHONE ENCOUNTER
CT SPINE CERVICAL (CPT=72125)        Referral #: [id number]      Scheduled For: 02/15/2024    Status: P2P Request > To discuss this case with a  reviewer, contact Radha at [phone]    Use Reference Case Number: [id number]         Rational: not meeting medical necessity     Notified clinical staff for follow-up,     Patient notified of pending status via [employer].     Appt Desk > Noted

## 2024-02-14 NOTE — TELEPHONE ENCOUNTER
Noted the imaging denial listed below.     Noted the patient LOV on 11/14/2024:  \"ASSESSMENT:  68 yoF s/p fall resulting in a C1 burst fracture involving the anterior and posterior arches of C1. Initially this fracture had no complicated radiographic features as there was no significant overhang of the lateral masses of C1 on C2.  Additionally, her neck pain had improved and she had remained neurologically intact.  For all the above reasons, this fracture was managed without a cervical collar.  Unfortunately, in the interim she had a subsequent fall with head trauma and neck extension.  Subsequent imaging demonstrated widening of the fracture gaps along with increasing overhang at the C1-2 joint on the right.  This prompted recommending use of cervical collar at all times.  An MRI of the cervical spine demonstrated no neural element compression.  She remains pain-free.  She is neurologically intact.  She had a CT yesterday demonstrated no significant healing of her fracture, but also stable displacement without any increase in overhang.  We had a lengthy discussion regarding her imaging findings and went over them in detail with her and her .  I answered all questions.  I talked to them about the very real possibility that if no further healing occurs, we may have to discuss surgical intervention.  The plan will be to obtain a CT of the cervical spine in 3 months followed by +/- flexion-extension cervical x-rays to assess for any instability.  Pending those results, we may recommend surgical invention at that time versus continued cervical collar management.  She demonstrated understanding of this appreciative of our time.     Plan:  - RTC 3 months  - CT cervical prior to next\"      Routed to the neurosurgeon to inquire if a peer to peer is requested for imaging denial

## 2024-02-15 ENCOUNTER — HOSPITAL ENCOUNTER (OUTPATIENT)
Dept: CT IMAGING | Facility: HOSPITAL | Age: 69
Discharge: HOME | End: 2024-02-15
Attending: STUDENT IN AN ORGANIZED HEALTH CARE EDUCATION/TRAINING PROGRAM
Payer: COMMERCIAL

## 2024-02-15 ENCOUNTER — OFFICE VISIT (OUTPATIENT)
Dept: SURGERY | Facility: CLINIC | Age: 69
End: 2024-02-15
Payer: COMMERCIAL

## 2024-02-15 VITALS
HEIGHT: 63 IN | SYSTOLIC BLOOD PRESSURE: 163 MMHG | BODY MASS INDEX: 31.89 KG/M2 | WEIGHT: 180 LBS | DIASTOLIC BLOOD PRESSURE: 99 MMHG | HEART RATE: 99 BPM

## 2024-02-15 DIAGNOSIS — S12.01XA CLOSED STABLE BURST FRACTURE OF FIRST CERVICAL VERTEBRA, INITIAL ENCOUNTER (HCC): ICD-10-CM

## 2024-02-15 DIAGNOSIS — S12.000A CLOSED DISPLACED FRACTURE OF FIRST CERVICAL VERTEBRA, UNSPECIFIED FRACTURE MORPHOLOGY, INITIAL ENCOUNTER (HCC): Primary | ICD-10-CM

## 2024-02-15 DIAGNOSIS — S12.01XD CLOSED STABLE BURST FRACTURE OF FIRST CERVICAL VERTEBRA WITH ROUTINE HEALING, SUBSEQUENT ENCOUNTER: ICD-10-CM

## 2024-02-15 DIAGNOSIS — S12.000A CLOSED DISPLACED FRACTURE OF FIRST CERVICAL VERTEBRA, UNSPECIFIED FRACTURE MORPHOLOGY, INITIAL ENCOUNTER (HCC): ICD-10-CM

## 2024-02-15 PROCEDURE — 72125 CT NECK SPINE W/O DYE: CPT | Performed by: STUDENT IN AN ORGANIZED HEALTH CARE EDUCATION/TRAINING PROGRAM

## 2024-02-15 PROCEDURE — 3080F DIAST BP >= 90 MM HG: CPT | Performed by: STUDENT IN AN ORGANIZED HEALTH CARE EDUCATION/TRAINING PROGRAM

## 2024-02-15 PROCEDURE — 3077F SYST BP >= 140 MM HG: CPT | Performed by: STUDENT IN AN ORGANIZED HEALTH CARE EDUCATION/TRAINING PROGRAM

## 2024-02-15 PROCEDURE — 99215 OFFICE O/P EST HI 40 MIN: CPT | Performed by: STUDENT IN AN ORGANIZED HEALTH CARE EDUCATION/TRAINING PROGRAM

## 2024-02-15 PROCEDURE — 3008F BODY MASS INDEX DOCD: CPT | Performed by: STUDENT IN AN ORGANIZED HEALTH CARE EDUCATION/TRAINING PROGRAM

## 2024-02-15 NOTE — PROGRESS NOTES
[facility]  Neurological Surgery Established Patient Clinic Note    Miranda Oliveros  8/16/1955  [id number]  PCP: Jhonathan Neal MD  Referring Provider: Bernardo Clark DO     REASON FOR VISIT:  C1 burst fracture    HISTORY OF PRESENT ILLNESS:  Miranda Oliveros is a(n) 68 year old female with HTN, HLD, GERD who was admitted s/p fall. History obtained from spouse at bedside as patient has no recollection of events. Per , the morning of 7/21 around 4 am he heard a noise and found her at the bottom of the stairs with blood all around her. She has been disoriented and complaining of neck pain since the fall. She has significant left sided periorbital edema. Trauma imaging was negative for intracranial injuries but did reveal a tiny non-displaced partial fracture of the left posterior arch of C1. Neurosurgery was consulted.       INTERVAL HISTORY 8/1/2023:   Ms. Oliveros presents for close follow-up of her C1 fracture. As documented in a separate TE note, I was contacted by radiology regarding additional findings on her CT of the cervical spine.  Therefore, she was brought in for close follow-up.  Today, she reports continued improvement in her condition.  Her bruising over her face is still present but much improved.  She continues to have a pretty sizable left subgaleal hematoma.  As far as her neck is concerned, she reports today by day improvement of her neck pain.  She is not necessitating c-collar, not even for comfort.  Her pain is controlled with Tylenol and anti-inflammatories.  She does feel some tension when rotating her neck in certain directions, but this is nonpainful more so than slightly restrictive.  She denies any paresthesias, balance difficulties, decreased dexterity, or any other red flags for myelopathy.     INTERVAL HISTORY 8/17/2023:   Ms. Oliveros presents for continued close follow-up of her C1 fracture.  Today, she is happy to report continued  improvement in her pain.  As of last week she was still able to feel little little whenever it was humid outside.  The last 2 days this is felt much better.  She had x-rays done today prior to her visit, this was reportedly stable.  She is here to review and discuss the results.     INTERVAL HISTORY 8/28/2023:   Ms. Oliveros presents for short interval reevaluation of her C1 fracture.  She was last seen on 8/17/2023. Unfortunately, shortly after leaving the clinic she went to her daughter's house and tripped and fell in the driveway.  She ended up hitting her head against concrete on the floor.  She did not have any significant increase in neck pain or other symptoms otherwise.  However, she was convinced to present to the emergency room for evaluation to rule out any intracranial pathology.  CT of the brain was negative for acute pathology.  However, CT of the cervical spine demonstrated worsening widening of her fracture fragments and significantly more overhang on the right C1-C2 joint concerning for ligamentous injury.  An MRI was obtained while in the hospital for this reason, which failed to demonstrate rosanne ligamentous injury.  No spinal cord compression.  Today, she reports 100% compliance with her cervical collar.  She does have some pressure pain along the posterior aspect of her head after a long day of wearing the c-collar.  Otherwise she denies significant neck pain.  She denies any neurologic symptoms.     INTERVAL HISTORY 9/26/2023:   Ms. Oliveros presents for close follow-up today.  It has been approximately 1 month since her second fall that resulted in distraction of her fracture.  She reports no neck pain.  No neurologic symptoms.  Everything is stable.  She is 100% compliant with her brace.  Most of the bruising over her face and scalp are mostly resolved.  She is planning a trip soon, and had some questions related to it.  She is also doing physical therapy and has been helping with her left  shoulder.  She had x-rays on the  and she is here to discuss the results.     INTERVAL HISTORY 2023:  Ms. Oliveros returns to clinic today for continued neurosurgical follow-up of her C1 burst fracture.  She had a CT of the cervical spine on 2023 and is here to discuss results.  She reports no neck pain.  She continues to be compliant with the c-collar.  She continues to deny any signs or symptoms concerning for cervical spinal cord compression.    INTERVAL HISTORY 2/15/2024:  Miranda Oliveros returns to clinic today for follow-up regarding her C1 fracture.  She recently had a CT of the cervical spine and is here to review those results.  She has been wearing cervical collar for at all times for the past 6 months.  She has no cervical pain but when there is a change in weather (rainy or cold) there is an arthritic type pain.  She continues to deny any neurologic changes.  She remains neurologically stable.    PAST MEDICAL HISTORY:  Past Medical History:   Diagnosis Date    Allergic rhinitis due to pollen     Carcinoma in situ of cervix uteri 1986    Esophageal reflux     High blood pressure     High cholesterol     Menopause 2006    Osteoarthritis     generalized    Pure hypercholesterolemia     Rosacea     Tubular adenoma of colon 2012    Visual impairment     glasses     PAST SURGICAL HISTORY:  Past Surgical History:   Procedure Laterality Date              COLONOSCOPY N/A 2021    Procedure: COLONOSCOPY with polypectomy;  Surgeon: Sivakumar Benson MD;  Location: [facility]    COLONOSCOPY,BIOPSY  2012    Procedure: COLONOSCOPY, POSSIBLE BIOPSY, POSSIBLE POLYPECTOMY 89758;  Surgeon: Sivakumar Benson MD;  Location: [facility]    COLONOSCOPY,DIAGNOSTIC N/A 2016    Procedure: COLONOSCOPY, POSSIBLE BIOPSY, POSSIBLE POLYPECTOMY 83289;  Surgeon: Sivakumar Benson MD;  Location: [facility]    CT HEART W/ CALCIUM  SCORING  2/16/2009    calcium score of 0    CT HEART W/ CALCIUM SCORING N/A 08/08/2019    calcium score is zero    IMPACT TOOTH REM BONY W/COMP Bilateral 1973    wisdom teeth    KNEE REPLACEMENT SURGERY Right 02/21/2017    right    LASER SURGERY OF CERVIX  1/1986    PERIPHERAL VASCULAR SCREENING HISTORICAL CONV Bilateral 8/1/2016    PAD screen is normal    TOTAL KNEE REPLACEMENT Right 02/22/2017     FAMILY HISTORY:  family history includes Alcohol and Other Disorders Associated in her maternal grandfather; Arthritis in her sister; Breast Cancer in her maternal great-grandmother; Breast Cancer (age of onset: 56) in her sister; Cancer in her paternal grandfather; Cancer (age of onset: 74) in her mother; Cataracts in her mother; Clotting Disorder in her sister; Dementia in her paternal grandmother; Diabetes in her father, paternal grandfather, and sister; Ear Problems in her father; Eye Problems in her maternal grandmother; Heart Disease in her father and maternal grandmother; Heart Disorder in her father and paternal grandfather; Heart Surgery (age of onset: 55) in her father; High Cholesterol in her maternal grandmother and sister; Hypertension in her father, mother, and sister; Kidney Disease in her sister; Lipids in her maternal grandmother; Musculo-skelatal Disorder in her maternal grandmother; Neurological Disorder in her paternal grandmother; Obesity in her sister; Other in her sister; Pulmonary Disease in her maternal grandmother.    SOCIAL HISTORY:   reports that she quit smoking about 41 years ago. Her smoking use included cigarettes. She has a 6 pack-year smoking history. She has never used smokeless tobacco. She reports current alcohol use of about 5.8 standard drinks of alcohol per week. She reports that she does not use drugs.    ALLERGIES:  Allergies   Allergen Reactions    Amoxicillin-Pot Clavulanate RASH     MEDICATIONS:  Current Outpatient Medications on File Prior to Visit   Medication Sig Dispense  Refill    famotidine 20 MG Oral Tab Take 1 tablet (20 mg total) by mouth 2 (two) times daily.      loratadine 10 MG Oral Tab Take 1 tablet (10 mg total) by mouth daily.      acetaminophen 325 MG Oral Tab Take 2 tablets (650 mg total) by mouth every 4 (four) hours as needed.  0    losartan 50 MG Oral Tab Take 1 tablet (50 mg total) by mouth daily. 30 tablet 0    polyethylene glycol, PEG 3350, 17 g Oral Powd Pack Take 17 g by mouth daily as needed. 30 each 0    montelukast 10 MG Oral Tab Take 1 tablet (10 mg total) by mouth nightly. 90 tablet 3    atorvastatin 10 MG Oral Tab TAKE 1 TABLET(10 MG) BY MOUTH EVERY DAY 90 tablet 3    Meloxicam 15 MG Oral Tab TAKE 1 TABLET(15 MG) BY MOUTH DAILY 90 tablet 3    Levocetirizine Dihydrochloride 5 MG Oral Tab Take 1 tablet (5 mg total) by mouth every evening.      Cyanocobalamin (VITAMIN B 12) 100 MCG Oral Lozenge Take 1,000 mcg by mouth daily.   30 lozenge 0    Vitamin D3 2000 units Oral Cap Take 2 capsules (4,000 Units total) by mouth daily. 180 capsule 3    cetirizine 10 MG Oral Tab Take 1 tablet (10 mg total) by mouth daily.      Omeprazole Magnesium 20 MG Oral Tab EC Take 1 tablet (20 mg total) by mouth daily. 28 tablet 0     No current facility-administered medications on file prior to visit.     REVIEW OF SYSTEMS:  All other systems were reviewed and were negative except for those previously mentioned in the HPI    PHYSICAL EXAMINATION:  All other systems were reviewed and were negative except for those previously mentioned in the HPI     PHYSICAL EXAMINATION:  General: No acute distress.  Respiratory: Non-labored respirations bilaterally. No audible wheezing  Cardiovascular: Extremities warm and well-perfused.  Abdomen: Soft, nontender, nondistended.   Musculoskeletal: Moves all extremities well, symmetrically.  Extremities: No edema.     NEUROLOGIC EXAMINATION:  Mental status: Alert and oriented x 3  Speech: Clear, fluent  Cranial nerves: PERRLA, EOMI, face symmetric,  with normal strength and sensation, tongue and palate midline, SCM 5/5 bilaterally  Motor:                           RIGHT  Delt 5/5   Bic 5/5  Tri 5/5   HI 5/5    5/5  IP 5/5   Quad 5/5   Ham 5/5   AT 5/5   EHL 5/5 Crispin 5/5                          LEFT    Delt 5/5   Bic 5/5  Tri 5/5   HI 5/5    5/5  IP 5/5   Quad 5/5   Ham 5/5   AT 5/5   EHL 5/5 Crispin 5/5            No pronator drift  Tone: Normal  Atrophy/Fasciculations: None  Sensation: Normal to light touch, symmetric, no neglect  Cerebellar: Normal finger nose finger  Gait: Normal, nondistressed heel toe tandem gait      Reflexes: 2+ throughout, symmetric, no Ophelia's    IMAGING:  CT cervical 2/15/2024: Stable appearance of known C1 burst fracture, which remains displaced and nonhealing.  Stable overhang of the C1 on C2 lateral mass.    ASSESSMENT:  68 yoF s/p fall resulting in a C1 burst fracture involving the anterior and posterior arches of C1. Initially this fracture had no complicated radiographic features as there was no significant overhang of the lateral masses of C1 on C2.  Additionally, her neck pain had improved and she had remained neurologically intact.  For all the above reasons, this fracture was managed without a cervical collar.  Unfortunately, in the interim she had a subsequent fall with head trauma and neck extension.  Subsequent imaging demonstrated widening of the fracture gaps along with increasing overhang at the C1-2 joint on the right.  This prompted recommending use of cervical collar at all times.  An MRI of the cervical spine demonstrated no neural element compression.  Her presentation is a complex one as she has remained pain-free.  She also remains neurologically intact.  She again had a CT today, that unfortunately shows no significant healing of her fracture with stable displacement and overhang of the C1 and C2 lateral masses.  We again had a lengthy discussion regarding her imaging findings and went over in detail  with her and her .  At this point, I do not think this is going to heal with conservative managements.  Despite her lack of pain, she remains very aware and concerned about not wearing her cervical collar.  I discussed in detail the option of performing flexion-extension x-rays, although this carries significant risk of displacement and neurologic injury.  The other option would be to perform occiput to C2 fixation and fusion to definitively treat this fracture and protect her neurologic condition.  We will plan on updating an MRI of the cervical spine, and likely proceed with fixation of fusion after careful discussions with them as well as the alternatives.     Plan:  - O-C2 fixation and fusion after updated MRI cervical    Bartolo King MD  Neurological Surgery    [address], [address]  [place] [id number] [phone]  Pager [phone]  2/15/2024 3:00 PM      This note was created using a voice-recognition transcribing system. Incorrect words or phrases may have been missed during proofreading. Please interpret accordingly.    Total Time    Established Patient Total Time       40  minutes.      Activities       Preparing to see the patient (chart/tests/imaging review).       Obtaining and/or reviewing separately obtained history.       Performing a medically appropriate examination and/or evaluation.       Counseling and educating the patient/family/caregiver.       Ordering medications, tests, or procedures.       Referring and communicating with other health care professionals (when not separately reported).       Documenting clincal information in the electronic or other health record.       Independently interpreting results (not separately reported).    Communicating results to the patient/family/caregiver.    Care coordination (not separately reported).

## 2024-02-15 NOTE — PROGRESS NOTES
Patient presents today to follow up regarding C1 fracture. Patient would like to review CT Cervical scan results. Patient has been wearing her Cervical collar at all times. Patient states she has no cervical pain but when there is a change in weather (rainy or cold) there is an arthritic, cervical pain.

## 2024-02-19 ENCOUNTER — TELEPHONE (OUTPATIENT)
Dept: SURGERY | Facility: CLINIC | Age: 69
End: 2024-02-19

## 2024-02-19 DIAGNOSIS — S12.000A CLOSED DISPLACED FRACTURE OF FIRST CERVICAL VERTEBRA, UNSPECIFIED FRACTURE MORPHOLOGY, INITIAL ENCOUNTER (HCC): Primary | ICD-10-CM

## 2024-02-19 NOTE — TELEPHONE ENCOUNTER
From: Miranda Oliveros  To: Bartolo King  Sent: 2/16/2024 12:40 PM CST  Subject: surgery question    Hello, I have decided to proceed with the surgery.  I’ve scheduled my MRI and the earliest I could get in was 3/21.  Yesterday I was processing a lot and I honestly don’t remember who I needed to contact to schedule surgery. Or if someone would be contacting me.  Please let me know what my next steps should be to get the surgery scheduled.    Thank you.  Marion Oliveros

## 2024-02-21 NOTE — TELEPHONE ENCOUNTER
Patient is scheduled for cervical occiput to cervical 2 posterior fixation and fusion on 4-9-24 with Dr King at [facility].    Y pre-op apt scheduled (if sx is more than 30 days from last apt)  Ypre-op apt scheduled with RN spine navigator  Y Surgical instructions reviewed by nursing staff with patient  Y  form completed  Y Surgery order signed   Y Placed sx on surgery sheet  Y Placed on outlook calendar  Y [employer] message sent to patient with sx instructions  Y Faxed pre-op clearance request to PCP Dr Berger   N/a Faxed letter to prescribing provider requesting anticoagulants be held for surgery  N/a E-mail sent to [email or web address]  Y Post-op appointments made  Y TANG SANTANA. Routed to PA team to initiate.  Y Post-Op outreach pt reminder placed.   Y Entire Neurosurgery Checklist Completed    Clearances: PCP  PA: MAX   CPT Codes: 75617, 83705, 75694, 68126, 79477, 85124, 92643, 63162

## 2024-02-21 NOTE — TELEPHONE ENCOUNTER
You are scheduled for cervical occiput to cervical 2 fixation and fusion on 4-9-24 with  at [facility].    Pre-op clearance from your primary care provider is needed within 30 days of surgery.  We have faxed a clearance request to Dr. Neal 's office.  Please contact their office for appointment.  Please schedule this appointment AT LEAST 1 WEEK PRIOR TO SURGERY DATE.  Your PCP may order additional testing or clearance from another specialist.     You will have an appointment with our RN Spine Navigator prior to surgery.  This is an educational telehealth/phone visit in which you will receive more information on the specifics of your surgery, instructions for before surgery, what to expect in the hospital and how to take care of yourself after surgery.  Your surgeon wants you to to participate in this visit so that you are as prepared for surgery as possible and have an opportunity to ask questions.  If possible, we would like your care partner to be present for the visit as well.      You will need to contact the Pre-admission department at [phone] to schedule your pre-op testing.  They will get you scheduled for blood work and a MRSA test (nasal swab). You will need for fast for the blood work.  You will need a COVID test 48-72 hours prior to surgery.  You may also need an EKG and/or chest x-ray depending on your current health status.  If they do not answer when you call, please leave a message and they will call you back.  They return calls in order of the date of surgery so it may be a few days before they return your call.      You should have nothing to eat or drink after 11:00pm the night prior to surgery EXCEPT GATORADE:  You will need to drink 12 ounces (small bottle) of regular Gatorade (NOT RED) 12 hours and 4 hours prior to your scheduled surgery time. Do not drink any other liquids (including water) before your surgery. Do not chew gum or eat candies before surgery.  Take 1000 mg  of Tylenol (Acetaminophen) 4 hours before your scheduled surgery time, take this with your scheduled Gatorade.  You should take your daily medications with the 4 hour Gatorade, unless instructed otherwise.     Surgery is usually scheduled as 2-3 day admission.  This is an estimate and varies from person to person.  Ultimately, the surgeon will determine when you are ready to be discharged.    Our office will contact your insurance for authorization of surgery.       The hospital will contact you 1-2 days before surgery with your expected arrival time and day-of instructions.     To prevent infection post-operatively, you will need to shower with Hibiclens soap for 5 days prior to surgery. The last shower should be the night before surgery.  Hibiclens soap can be found at any pharmacy in the first-aid section.  See detailed instructions at the end of this message.      FMLA/DISABILITY PAPERWORK  If you require FMLA or disability paperwork for your recovery, please make sure to either drop it off or have it faxed to our office at [phone]. Be sure the form has your name and date of birth on it.  The form will be faxed to our Forms Department and they will complete it for you.  There is a 25$ fee for all forms that need to be filled out.  Please be aware there is a 10-14 day turnaround time.  You will need to sign a release of information (KOSTAS) form if your paperwork does not come with one.  You may call the Forms Department with any questions at [phone].  Their fax number is [phone].     Hibiclens Bathing  Hibiclens is a body soap that is used before surgery protect you from getting an infection after surgery   Hibiclens can be found in most pharmacies in the First Aid supplies  Shower with this daily for FIVE consecutive days before surgery, using the entire bottle over the five days.  The last shower should be the night before surgery.   Steps to bathing with Hibiclens  Do not use Hibiclens on your  hair, face or private areas  Wash your hair and face as normal with your usual cleansers  Rinse well  Using a clean wet washcloth apply enough Hibiclens to cover your body. Wash from the neck down avoiding the genital areas and concentrating on the surgical area  Rinse well  Dry yourself with a clean, dry towel  Do not use any powders, creams, lotions or sprays on your body as these attract bacteria  Deodorant and facial creams are acceptable.   (Laundering/cleaning: Chlorhexidine gluconate skin cleansers will cause stains if used with chlorine releasing products. Rinse completely and use only non-chlorine detergents.)    POST OP CARE--First Two Weeks  No bending at waist, twisting, pushing or pulling  No lifting more than 10 lb (a gallon of milk)  Wear cervical collar/lumbar brace, if prescribed, as instructed by surgeon  No overhead reaching  For anterior cervical surgeries, begin with eating soft foods and thick liquids for the first few days.  It is normal to have some discomfort when swallowing.  Return to normal diet as tolerated.   No driving until approved by your surgeon   Change positions frequently. No prolonged sitting or standing.  Increase walking as tolerated to avoid blood clots  No NSAIDs until approved by surgeon (ibuprofen, aleve, advil, meloxicam, Celebrex, diclofenac etc).   Remove any bandage on post op day 3.  Leave incision open to air.  Glue will fall off on its own.  If you have staples or sutures that are not dissolvable, these will be removed at your 2 week post op visit.   Check your incision for signs of infection daily (redness, warmth, drainage, increased pain at incision site, fever)  Do not shower until post op day 3.  Do not allow water pressure to directly hit the incision.  Do not scrub the incision and avoid any lotion, creams or perfume near the incision site.  No swimming, hot tubs or baths until your incision is completely healed  Take pain medication as prescribed, if  needed.  Constipation is a common side effect of opioids.  If you experience constipation, drink plenty of water and take over-the-counter stool softeners daily.   Avoid nicotine and alcohol   When to call the office:  Increased or change in location of pain  Increased weakness in arms or legs  Incision drainage, redness or warmth  Fever  Bowel or bladder changes   Choking on food or liquids (cervical)  Pain, redness, swelling, color changes or warmth in lower leg

## 2024-02-25 NOTE — ED QUICK NOTES
MD at bedside. Patient playing around room on the floor and along side mom in the chair, drinking out of her sippy cup. Took dissolved Zofran without difficulty.

## 2024-03-18 ENCOUNTER — TELEPHONE (OUTPATIENT)
Dept: SURGERY | Facility: CLINIC | Age: 69
End: 2024-03-18

## 2024-03-18 NOTE — TELEPHONE ENCOUNTER
Received denial for MRI from [facility].       Denial Reason:    Your doctor said that you have a broken bone in your neck.  Your doctor ordered an MRI of your neck.  An MRI is a way to take pictures of the inside of your body.  This test should be used when the results will be used to plan for a procedure or a surgery.  The notes do not show that you are going to have a procedure or surgery.  Based on the information we have, this test is not medically necessary.      You have 180 days from 03/15/24 to file an appeal by calling [phone].

## 2024-03-18 NOTE — TELEPHONE ENCOUNTER
MR cervical spine at [facility]  3/14/2024-5/12/2024  Auth #[id number]    Bartolo King MD  Neurological Surgery    [address], [address] [place] [id number]  [phone]  Pager [phone]  3/18/2024 4:01 PM      This note was created using a voice-recognition transcribing system. Incorrect words or phrases may have been missed during proofreading. Please interpret accordingly.

## 2024-03-19 NOTE — TELEPHONE ENCOUNTER
Received approval for MRI from [facility].  Req [id number] effective from 03/14/24-05/12/24.    Faxed to [facility].  Confirmation received.

## 2024-03-19 NOTE — TELEPHONE ENCOUNTER
Prior Authorization for cervical occiput to cervical 2 posterior fixation and fusion initiated with [facility].  CPT codes: 80410,35325,26465,67171,70911,74726  Request for injection pending review, pending reference #[id number]. Clinical notes submitted via [facility].      Spoke with Freya at [facility] PA Dept (phone #: [phone])   Call Reference #: [id number]    Per Freya -CPT Codes 29849 and 51848 Do Not Require Prior Authorization but since surgery will be Inpatient CPT Codes will need to be included with inpatient stay PA Case [facility].

## 2024-03-21 ENCOUNTER — HOSPITAL ENCOUNTER (OUTPATIENT)
Dept: MRI IMAGING | Facility: HOSPITAL | Age: 69
Discharge: HOME | End: 2024-03-21
Attending: STUDENT IN AN ORGANIZED HEALTH CARE EDUCATION/TRAINING PROGRAM
Payer: COMMERCIAL

## 2024-03-21 DIAGNOSIS — S12.01XA CLOSED STABLE BURST FRACTURE OF FIRST CERVICAL VERTEBRA, INITIAL ENCOUNTER (HCC): ICD-10-CM

## 2024-03-21 DIAGNOSIS — S12.000A CLOSED DISPLACED FRACTURE OF FIRST CERVICAL VERTEBRA, UNSPECIFIED FRACTURE MORPHOLOGY, INITIAL ENCOUNTER (HCC): ICD-10-CM

## 2024-03-21 DIAGNOSIS — S12.01XD CLOSED STABLE BURST FRACTURE OF FIRST CERVICAL VERTEBRA WITH ROUTINE HEALING, SUBSEQUENT ENCOUNTER: ICD-10-CM

## 2024-03-21 PROCEDURE — 72141 MRI NECK SPINE W/O DYE: CPT | Performed by: STUDENT IN AN ORGANIZED HEALTH CARE EDUCATION/TRAINING PROGRAM

## 2024-04-01 ENCOUNTER — TELEPHONE (OUTPATIENT)
Dept: SURGERY | Facility: CLINIC | Age: 69
End: 2024-04-01

## 2024-04-01 DIAGNOSIS — S12.000A CLOSED DISPLACED FRACTURE OF FIRST CERVICAL VERTEBRA, UNSPECIFIED FRACTURE MORPHOLOGY, INITIAL ENCOUNTER (HCC): Primary | ICD-10-CM

## 2024-04-01 NOTE — TELEPHONE ENCOUNTER
[facility] preadmission testing called. Patient has abnormal lab results. Schedule for surgery on 4/9/24 with Dr. King.

## 2024-04-01 NOTE — PAT NURSING NOTE
Notified Amelia at Dr. Gonsalves's office [facility] Reflex lab results abnormal. States she will message patient's PCP.

## 2024-04-02 ENCOUNTER — PATIENT MESSAGE (OUTPATIENT)
Dept: SURGERY | Facility: CLINIC | Age: 69
End: 2024-04-02

## 2024-04-02 ENCOUNTER — NURSE ONLY (OUTPATIENT)
Dept: SURGERY | Facility: CLINIC | Age: 69
End: 2024-04-02
Payer: COMMERCIAL

## 2024-04-02 DIAGNOSIS — S12.000A CLOSED DISPLACED FRACTURE OF FIRST CERVICAL VERTEBRA, UNSPECIFIED FRACTURE MORPHOLOGY, INITIAL ENCOUNTER (HCC): Primary | ICD-10-CM

## 2024-04-02 DIAGNOSIS — Z71.9 ENCOUNTER FOR EDUCATION: Primary | ICD-10-CM

## 2024-04-02 NOTE — PROGRESS NOTES
RN Spine Navigator Education for Miranda     If you have received instructions from your surgeon that are different from those listed below, please follow your physician's instructions.    You are scheduled for a Cervical fusion and Cervical decompression with Dr. King on 4/9.      Patient Surgical Goals: Heal fracture and get out of neck    Before Your Surgery    Choose a Care Partner  Patient attended spine navigator visit independently.  Care partner is Evaristo. Your care partner(s) should be able to provide transportation to and from the hospital. They should be able to help at home for the first week after discharge, including helping you with meals, medication, and dressing changes.    Clearance Before Surgery  You will need to see your primary care provider within 30 days before surgery. Please make sure this appointment is at least a week before surgery as more testing or doctor visits may be ordered. Presurgical testing may include labs, nasal swab, imaging, EKG.   Make sure that you complete all preadmission testing so that surgery does not get delayed.    Home Environment  Assessed home status: home has stairs, bathroom and bedroom are upstairs, and patient has pets. Suggested arrangements for pet care.  It is recommended that you prepare your home by putting clean sheets on your bed, freezing meals, and putting frequently used items at waist level.   Prevent falls by removing items that could cause you to trip, adding nightlights and adding a nonskid mat in shower.   Assistive devices can be purchased at a medical supply store or online including canes, walkers, toileting devices (commodes, raised toilet seats, toilet paper wiping aid), long handled sponge, shower chair or tub transfer bench, grabber/reacher tool, sock aid, long handled shoehorn, if needed.      Tobacco, Nicotine and Marijuana Use   Not applicable    Medications to Stop   For 7-10 days before surgery do not take any blood thinning  medications. This includes non-steroidal anti-inflammatories or NSAIDs (Advil, Aleve, Motrin, ibuprofen, naproxen, meloxicam, diclofenac, celecoxib, etc.), aspirin (unless told otherwise by your cardiologist or surgeon), herbal supplements and vitamins (garlic, turmeric, vitamin E, fish oil or krill oil, etc.). You may only take Tylenol or prescribed narcotic medication if needed for pain.   Other medications to stop include: hold ACE/ARBS day of surgery    Leading Up to Day of Surgery  Five days before surgery wash with Hibiclens soap after your regular body soap every day. Do not put use Hibiclens on your face, hair or privates. Your last shower should be the night before surgery.  One business day before surgery, the preadmission testing staff will call you and let you know what time to arrive, where to park and will provide any additional instructions.   After 11pm the day before surgery, do not eat or drink anything (including water, gum, or candies) except for Tylenol and Gatorade.   Drink 12 ounces of regular Gatorade (NOT RED) 12 hours prior to your scheduled surgery time. Drink your second 12 ounces of regular Gatorade and take 1000 mg of Tylenol (acetaminophen) 4 hours before your scheduled surgery time.     Items to Bring to the Hospital   Bring insurance card, ID, advanced directive, or medical power of  paperwork, loose fitting clothing, shoes with a back that can accommodate swollen feet, long phone charging cord, glasses and brace.  Do not bring jewelry, valuables, or medications.   If you take an uncommon medication that the hospital may not have, it must be brought to the hospital in the original container, and you must notify the nurse of this medication.     In the Hospital     Operative Day and Hospital Stay  In the preoperative area, you will change into a gown, have an IV placed in your hand or arm by the nurse, and sign any consent paperwork that is needed for your procedure. You will  speak to the surgeon and anesthesiologist. It is important to tell the doctors and nurses if you have had any significant side effects from pain medications or anesthesia such as a rash or severe nausea.    In the operating room, the anesthesiologist will attach monitors, give you oxygen through a mask, and give you medicine through the IV to fall asleep. After you are sleeping, the breathing tube will be placed. The surgeon may use additional nerve monitoring during your surgery. This is to make sure that the muscles and nerves in your arms and legs are working normally as he operates. The equipment will be hooked up and removed while you are asleep. You will wake up on the stretcher.     During the surgery, your care partner can sit in the surgical waiting area. There are TV screens in that area that keep them informed of your progress.     In the recovery room, monitors will be attached that check your heart rate, blood pressure and oxygen levels. While you may not remember this part, a nurse is with you and constantly checking on your condition. Medications for pain and nausea will be given if you need them. You may have a barajas catheter to empty your bladder or a drain at your surgical site. Your family is not allowed in the recovery room. When you are ready to leave the recovery room, you will be transported on your stretcher to the inpatient unit accompanied by your family once a room is available.  On the inpatient unit, a team of doctors and advanced practice providers will manage your care. The spine care nurses will check your blood pressure, temperature, heartbeat, breathing, stomach sounds and your incision. They may assess the strength and sensation in your arms and legs. Medications that are given in the hospital include antibiotics, IV fluids, pain medications, muscle relaxers, stool softeners, and your home medications. You may get blood drawn and another x-ray. Physical and occupational therapists  may come to your room to teach you how to move around safely after surgery.     Post Op Plan   The average length of hospital stay is one to three days. A  may visit you to help arrange extra care for you once you leave the hospital. Occasionally, it is recommended that a home health nurse or therapist visit you in your home for a short time. The best place to recover is your own home, but if you need more assistance than home health and your care partner can provide, the  will help you and your family choose other facilities to help you recover your strength.    Preventing surgical complications  It is important to follow all instructions before and after surgery to decrease the risks of surgery and prevent complications.     Blood clots: walk, wear leg compression devices in the hospital, and do ankle pumps at home  Infection: wash with Hibiclens before surgery, wash your hands, don't touch your incision  Pneumonia: take deep breaths and cough and use the breathing exercise (incentive spirometer)   Nausea/vomiting: start with liquids and small meals and do not take pills on an empty stomach  Constipation: drink water and walk frequently    Tell your nurse if you are experiencing nausea, vomiting or constipation as they have medications to help treat these.      At home     Understanding Pain After Surgery  We will do our best to manage your pain after surgery, but it is not possible to be completely pain-free. There will be operative pain in your back or neck. Pain in the arms or legs may be one of the first things to improve. Numbness, weakness, and tingling should improve over time. However, there can be a temporary increase in symptoms in the first few days due to inflammation from surgery.   Pain medications will be prescribed to take home at discharge. The goal of pain medicine after surgery is to make your pain tolerable, not to make you pain free. We encourage you to use the  medication prescribed to you after your surgery, but please take the lowest possible dose to manage your pain. Taking high doses of narcotics can cause side effects. Transition to plain Tylenol when your pain improves. You may get more continuous pain relief by alternating between medications if you have multiple instead of taking them at the same time. Write down when you have taken a medication as it may be difficult to remember after a few doses.    Post operative medication   Tylenol (acetaminophen): take for pain. Do not take more than 3000 mg - 4000 mg in 24 hours because it can damage your liver.   Narcotics: take for moderate to severe pain. Do not drink alcohol or drive while taking narcotics. Some narcotic medications (Norco, Tylenol #3, Percocet, Vicodin) contain Tylenol (acetaminophen). Make sure to not exceed the maximum dose if you are taking additional Tylenol with these medications.  Muscle relaxers: take for muscle cramping. These can cause drowsiness.    NSAIDS (Advil, Aleve, Motrin, ibuprofen, naproxen, meloxicam, diclofenac, celecoxib, etc.) or aspirin: Do not take these unless your physician says it is ok. For a fusion, it may be several months before you can take NSAIDS.  Stool softeners: take to prevent constipation while you are taking narcotic medications. You can get these over the counter at the pharmacy. You may use laxatives, which are stronger, if needed.    If you believe you will need more of medication your surgeon has prescribed to you, request a refill through your pharmacy or through the refill request in [employer] (log in, go to medications, then select refill request) at least two business days before you run out of medication.     Nonpharmacologic pain management   You may use ice on your incision for 20 minutes every hour to help with pain and swelling. Do not place ice directly on your skin. You can use heat to sooth your muscles but avoid placing heat directly on your  incision. Make frequent position changes. You can do gentle stretching while avoiding significant bending or twisting. Use deep breathing techniques and distractions such as TV, music, reading, or games.     Movement restrictions  After surgery, no bending or twisting your neck or back. Do not lift anything over 10lbs (about the weight of a gallon of milk) or lift anything over your shoulders. Avoid pulling or pushing. You may use stairs while holding the handrail.  It is ok to ride in the car but refrain from driving or traveling long distances until cleared to do so by your surgeon. You may not drive while taking narcotics or muscle relaxants. If you have cervical surgery, it may be several weeks before you can drive. , If you had a fracture or fusion surgery, your doctor may give you a brace. Braces are worn for comfort, when up and moving around, or constantly depending on your doctor's order. Wear your brace as instructed.     Post Op Exercise   Walk frequently. Start with walking short distances and increase as you start to feel better. Do ankle pumps (bending at your ankles, bring your feet towards your head then point them towards the ground) 15-20 times every hour when awake to help prevent blood clots.     Your surgeon will let you know at your post operative appointment if you are ready to decrease your movement restrictions and increase your exercise. If you have questions in between appointments about lessening your restrictions, please contact the office.     You and your doctor will discuss how you are feeling as you heal and decide if outpatient physical therapy or a medical fitness referral is needed.    Caring for your incision  Always wash your hands before touching your incision. Your incision will be closed with sutures under the skin and skin glue or Steri-Strips (thin white bandages) on top of the skin. Do not attempt to peal off Skin glue/Steri-Strips as they will come off on their own. If  the incision is closed with sutures or staples on top of the skin, they will be removed at a post op appointment. The incision may be covered with a gauze dressing that can come off after three days. Once the original gauze dressing is removed, we prefer that you leave your incision open to air (without a gauze dressing). If the incision has drainage or is rubbing against your clothes or brace, you may place gauze and medical tape over it. Change the gauze and medical tape daily. Look at your incision daily to check for signs of infection.  You can shower three days after your surgery or sooner if your surgeon allows. We recommend the care partner be present during the first shower for safety. Let soapy water run over the incision, but do not scrub it or spray it directly. Gently pat it dry after with a clean towel. Do not apply any creams or lotions to the incision. Do not soak in a tub, pool, or any body of water until your incision is fully healed.    Signs of Infection   Check your incision daily for swelling, redness, drainage, pus, bad smell, or opening of the incision.     When to Call for Assistance  Call the Neurosurgery Office ([phone] Option #2) if you experience signs of infection, opening of the incision, continuous nausea or vomiting, poor pain control despite using the pain medication as directed, a sudden increase in pain, temperature over 101F, difficulty swallowing, leg swelling, or with any concerns, unanswered questions, or new problems, such as new numbness/weakness/tingling.  Call 911 or go to the nearest emergency room if you experience chest pain, difficulty breathing, loss of bowel or bladder control, extreme drowsiness, or any other life-threatening situation.     Follow-up Plan   Appointments with Dr. King or John at 2-3, 4-6 and 12 weeks    Answered questions regarding: doctor rounding in hospital     You can contact the RN Spine Navigator at [phone] or [email or web address]  with additional questions or feedback on your care. It may take several business days to receive a reply so please do not call the RN spine navigator for refills or for emergencies.    Spine navigator spent 38 minutes conducting a virtual visit to provide education. Thank you for letting the RN Spine Navigator participate in your care.

## 2024-04-02 NOTE — TELEPHONE ENCOUNTER
Noted patient's question for a need to complete a Covid test is also being addressed in \"Schedule Sx\" TE per CHAN Mercer.     Awaiting feedback from ELLI Ahumada PA if she will be taken to ICU post op, thus there would be a need for Covid test to be performed.

## 2024-04-02 NOTE — TELEPHONE ENCOUNTER
From: Miranda Oliveros  To: Bartolo King  Sent: 4/2/2024 1:21 PM CDT  Subject: follow up question for Karen.    Karen, I did forget one question. Previously, something was mentioned about a covid text 2-3 days prior. This was not mentioned today during our conversation today and I forgot to bring it up. Is a covid test still required? and do you know where I can even get one anymore? 2-3 days prior would put me at Saturday/Sunday, so it would have to be a place that tests on the weekend.   Thank you. Marion

## 2024-04-03 RX ORDER — ASCORBIC ACID 500 MG
500 TABLET ORAL DAILY
COMMUNITY

## 2024-04-04 ENCOUNTER — OFFICE VISIT (OUTPATIENT)
Dept: SURGERY | Facility: CLINIC | Age: 69
End: 2024-04-04
Payer: COMMERCIAL

## 2024-04-04 ENCOUNTER — LAB ENCOUNTER (OUTPATIENT)
Dept: LAB | Facility: HOSPITAL | Age: 69
End: 2024-04-04
Attending: STUDENT IN AN ORGANIZED HEALTH CARE EDUCATION/TRAINING PROGRAM
Payer: COMMERCIAL

## 2024-04-04 VITALS
HEART RATE: 96 BPM | HEIGHT: 63 IN | RESPIRATION RATE: 18 BRPM | BODY MASS INDEX: 31.36 KG/M2 | OXYGEN SATURATION: 96 % | WEIGHT: 177 LBS

## 2024-04-04 DIAGNOSIS — S12.01XA CLOSED STABLE BURST FRACTURE OF FIRST CERVICAL VERTEBRA, INITIAL ENCOUNTER (HCC): ICD-10-CM

## 2024-04-04 DIAGNOSIS — M53.2X2 SPINAL INSTABILITY OF CERVICAL REGION: ICD-10-CM

## 2024-04-04 DIAGNOSIS — S12.000A CLOSED DISPLACED FRACTURE OF FIRST CERVICAL VERTEBRA, UNSPECIFIED FRACTURE MORPHOLOGY, INITIAL ENCOUNTER (HCC): Primary | ICD-10-CM

## 2024-04-04 DIAGNOSIS — S12.01XD CLOSED STABLE BURST FRACTURE OF FIRST CERVICAL VERTEBRA WITH ROUTINE HEALING, SUBSEQUENT ENCOUNTER: ICD-10-CM

## 2024-04-04 DIAGNOSIS — S12.000A CLOSED DISPLACED FRACTURE OF FIRST CERVICAL VERTEBRA, UNSPECIFIED FRACTURE MORPHOLOGY, INITIAL ENCOUNTER (HCC): ICD-10-CM

## 2024-04-04 LAB
ANTIBODY SCREEN: NEGATIVE
RH BLOOD TYPE: POSITIVE

## 2024-04-04 PROCEDURE — 86901 BLOOD TYPING SEROLOGIC RH(D): CPT | Performed by: STUDENT IN AN ORGANIZED HEALTH CARE EDUCATION/TRAINING PROGRAM

## 2024-04-04 PROCEDURE — 99215 OFFICE O/P EST HI 40 MIN: CPT | Performed by: STUDENT IN AN ORGANIZED HEALTH CARE EDUCATION/TRAINING PROGRAM

## 2024-04-04 PROCEDURE — 3008F BODY MASS INDEX DOCD: CPT | Performed by: STUDENT IN AN ORGANIZED HEALTH CARE EDUCATION/TRAINING PROGRAM

## 2024-04-04 PROCEDURE — 86850 RBC ANTIBODY SCREEN: CPT | Performed by: STUDENT IN AN ORGANIZED HEALTH CARE EDUCATION/TRAINING PROGRAM

## 2024-04-04 PROCEDURE — 86900 BLOOD TYPING SEROLOGIC ABO: CPT | Performed by: STUDENT IN AN ORGANIZED HEALTH CARE EDUCATION/TRAINING PROGRAM

## 2024-04-04 PROCEDURE — 86880 COOMBS TEST DIRECT: CPT | Performed by: STUDENT IN AN ORGANIZED HEALTH CARE EDUCATION/TRAINING PROGRAM

## 2024-04-04 PROCEDURE — 86870 RBC ANTIBODY IDENTIFICATION: CPT | Performed by: STUDENT IN AN ORGANIZED HEALTH CARE EDUCATION/TRAINING PROGRAM

## 2024-04-04 PROCEDURE — 86077 PHYS BLOOD BANK SERV XMATCH: CPT | Performed by: STUDENT IN AN ORGANIZED HEALTH CARE EDUCATION/TRAINING PROGRAM

## 2024-04-04 RX ORDER — LOSARTAN POTASSIUM 100 MG/1
TABLET ORAL
COMMUNITY
Start: 2024-03-19

## 2024-04-04 NOTE — H&P (VIEW-ONLY)
[facility]  Neurological Surgery Established Patient Clinic Note    Miranda Oliveros  8/16/1955  [id number]  PCP: Jhonathan Neal MD  Referring Provider: Bernardo Clark DO     REASON FOR VISIT:  C1 burst fracture    HISTORY OF PRESENT ILLNESS:  Miranda Oliveros is a(n) 68 year old female with HTN, HLD, GERD who was admitted s/p fall. History obtained from spouse at bedside as patient has no recollection of events. Per , the morning of 7/21 around 4 am he heard a noise and found her at the bottom of the stairs with blood all around her. She has been disoriented and complaining of neck pain since the fall. She has significant left sided periorbital edema. Trauma imaging was negative for intracranial injuries but did reveal a tiny non-displaced partial fracture of the left posterior arch of C1. Neurosurgery was consulted.       INTERVAL HISTORY 8/1/2023:   Ms. Oliveros presents for close follow-up of her C1 fracture. As documented in a separate TE note, I was contacted by radiology regarding additional findings on her CT of the cervical spine.  Therefore, she was brought in for close follow-up.  Today, she reports continued improvement in her condition.  Her bruising over her face is still present but much improved.  She continues to have a pretty sizable left subgaleal hematoma.  As far as her neck is concerned, she reports today by day improvement of her neck pain.  She is not necessitating c-collar, not even for comfort.  Her pain is controlled with Tylenol and anti-inflammatories.  She does feel some tension when rotating her neck in certain directions, but this is nonpainful more so than slightly restrictive.  She denies any paresthesias, balance difficulties, decreased dexterity, or any other red flags for myelopathy.     INTERVAL HISTORY 8/17/2023:   Ms. Oliveros presents for continued close follow-up of her C1 fracture.  Today, she is happy to report continued  improvement in her pain.  As of last week she was still able to feel little little whenever it was humid outside.  The last 2 days this is felt much better.  She had x-rays done today prior to her visit, this was reportedly stable.  She is here to review and discuss the results.     INTERVAL HISTORY 8/28/2023:   Ms. Oliveros presents for short interval reevaluation of her C1 fracture.  She was last seen on 8/17/2023. Unfortunately, shortly after leaving the clinic she went to her daughter's house and tripped and fell in the driveway.  She ended up hitting her head against concrete on the floor.  She did not have any significant increase in neck pain or other symptoms otherwise.  However, she was convinced to present to the emergency room for evaluation to rule out any intracranial pathology.  CT of the brain was negative for acute pathology.  However, CT of the cervical spine demonstrated worsening widening of her fracture fragments and significantly more overhang on the right C1-C2 joint concerning for ligamentous injury.  An MRI was obtained while in the hospital for this reason, which failed to demonstrate rosanne ligamentous injury.  No spinal cord compression.  Today, she reports 100% compliance with her cervical collar.  She does have some pressure pain along the posterior aspect of her head after a long day of wearing the c-collar.  Otherwise she denies significant neck pain.  She denies any neurologic symptoms.     INTERVAL HISTORY 9/26/2023:   Ms. Oliveros presents for close follow-up today.  It has been approximately 1 month since her second fall that resulted in distraction of her fracture.  She reports no neck pain.  No neurologic symptoms.  Everything is stable.  She is 100% compliant with her brace.  Most of the bruising over her face and scalp are mostly resolved.  She is planning a trip soon, and had some questions related to it.  She is also doing physical therapy and has been helping with her left  shoulder.  She had x-rays on the  and she is here to discuss the results.     INTERVAL HISTORY 2023:  Ms. Oliveros returns to clinic today for continued neurosurgical follow-up of her C1 burst fracture.  She had a CT of the cervical spine on 2023 and is here to discuss results.  She reports no neck pain.  She continues to be compliant with the c-collar.  She continues to deny any signs or symptoms concerning for cervical spinal cord compression.     INTERVAL HISTORY 2/15/2024:  Miranda Oliveros returns to clinic today for follow-up regarding her C1 fracture.  She recently had a CT of the cervical spine and is here to review those results.  She has been wearing cervical collar for at all times for the past 6 months.  She has no cervical pain but when there is a change in weather (rainy or cold) there is an arthritic type pain.  She continues to deny any neurologic changes.  She remains neurologically stable.    INTERVAL HISTORY 2024:  Miranda Oliveros returns to clinic today for preoperative discussions.  She is scheduled to undergo posterior cervical fixation and fusion on 2024.  She recently had an MRI of the cervical spine on 3/21/2024 and is here to discuss those results.  I answered all her and her 's questions.    PAST MEDICAL HISTORY:  Past Medical History:   Diagnosis Date    Allergic rhinitis due to pollen     Carcinoma in situ of cervix uteri 1986    Cataract     Esophageal reflux     High blood pressure     High cholesterol     Hyperlipidemia     Menopause 2006    Osteoarthritis     generalized    Pure hypercholesterolemia     Rosacea     Tubular adenoma of colon 2012    Visual impairment     glasses     PAST SURGICAL HISTORY:  Past Surgical History:   Procedure Laterality Date              COLONOSCOPY N/A 2021    Procedure: COLONOSCOPY with polypectomy;  Surgeon: Sivakumar Benson MD;  Location: [facility]     COLONOSCOPY,BIOPSY  11/5/2012    Procedure: COLONOSCOPY, POSSIBLE BIOPSY, POSSIBLE POLYPECTOMY 52409;  Surgeon: Sivakumar Benson MD;  Location: [facility]    COLONOSCOPY,DIAGNOSTIC N/A 5/14/2016    Procedure: COLONOSCOPY, POSSIBLE BIOPSY, POSSIBLE POLYPECTOMY 69360;  Surgeon: Sivakumar Benson MD;  Location: [facility]    CT HEART W/ CALCIUM SCORING  2/16/2009    calcium score of 0    CT HEART W/ CALCIUM SCORING N/A 08/08/2019    calcium score is zero    IMPACT TOOTH REM BONY W/COMP Bilateral 1973    wisdom teeth    KNEE REPLACEMENT SURGERY Right 02/21/2017    right    LASER SURGERY OF CERVIX  1/1986    PERIPHERAL VASCULAR SCREENING HISTORICAL CONV Bilateral 8/1/2016    PAD screen is normal    TOTAL KNEE REPLACEMENT Right 02/22/2017     FAMILY HISTORY:  family history includes Alcohol and Other Disorders Associated in her maternal grandfather; Arthritis in her sister; Breast Cancer in her maternal great-grandmother; Breast Cancer (age of onset: 56) in her sister; Cancer in her paternal grandfather; Cancer (age of onset: 74) in her mother; Cataracts in her mother; Clotting Disorder in her sister; Dementia in her paternal grandmother; Diabetes in her father, paternal grandfather, and sister; Ear Problems in her father; Eye Problems in her maternal grandmother; Heart Disease in her father and maternal grandmother; Heart Disorder in her father and paternal grandfather; Heart Surgery (age of onset: 55) in her father; High Cholesterol in her maternal grandmother and sister; Hypertension in her father, mother, and sister; Kidney Disease in her sister; Lipids in her maternal grandmother; Musculo-skelatal Disorder in her maternal grandmother; Neurological Disorder in her paternal grandmother; Obesity in her sister; Other in her sister; Pulmonary Disease in her maternal grandmother.    SOCIAL HISTORY:   reports that she quit smoking about 41 years ago. Her smoking use included cigarettes. She  has a 6 pack-year smoking history. She has never used smokeless tobacco. She reports current alcohol use of about 7.0 standard drinks of alcohol per week. She reports that she does not use drugs.    ALLERGIES:  Allergies   Allergen Reactions    Amoxicillin-Pot Clavulanate RASH     No peeling/ blisters  No organ damage     MEDICATIONS:  Current Outpatient Medications on File Prior to Visit   Medication Sig Dispense Refill    losartan 100 MG Oral Tab       Vitamin C 500 MG Oral Tab Take 1 tablet (500 mg total) by mouth daily.      acetaminophen 325 MG Oral Tab Take 2 tablets (650 mg total) by mouth every 4 (four) hours as needed.  0    montelukast 10 MG Oral Tab Take 1 tablet (10 mg total) by mouth nightly. 90 tablet 3    atorvastatin 10 MG Oral Tab TAKE 1 TABLET(10 MG) BY MOUTH EVERY DAY 90 tablet 3    Meloxicam 15 MG Oral Tab TAKE 1 TABLET(15 MG) BY MOUTH DAILY 90 tablet 3    Cyanocobalamin (VITAMIN B 12) 100 MCG Oral Lozenge Take 1,000 mcg by mouth daily.   30 lozenge 0    Vitamin D3 2000 units Oral Cap Take 2 capsules (4,000 Units total) by mouth daily. 180 capsule 3    cetirizine 10 MG Oral Tab Take 1 tablet (10 mg total) by mouth daily.      Omeprazole Magnesium 20 MG Oral Tab EC Take 1 tablet (20 mg total) by mouth every morning. 28 tablet 0     No current facility-administered medications on file prior to visit.     REVIEW OF SYSTEMS:  All other systems were reviewed and were negative except for those previously mentioned in the HPI    PHYSICAL EXAMINATION:  General: No acute distress.  Respiratory: Non-labored respirations bilaterally. No audible wheezing  Cardiovascular: Extremities warm and well-perfused.  Abdomen: Soft, nontender, nondistended.   Musculoskeletal: Moves all extremities well, symmetrically.  Extremities: No edema.     NEUROLOGIC EXAMINATION:  Mental status: Alert and oriented x 3  Speech: Clear, fluent  Cranial nerves: PERRLA, EOMI, face symmetric, with normal strength and sensation, tongue  and palate midline, SCM 5/5 bilaterally  Motor:                           RIGHT  Delt 5/5   Bic 5/5  Tri 5/5   HI 5/5    5/5  IP 5/5   Quad 5/5   Ham 5/5   AT 5/5   EHL 5/5 Crispin 5/5                          LEFT    Delt 5/5   Bic 5/5  Tri 5/5   HI 5/5    5/5  IP 5/5   Quad 5/5   Ham 5/5   AT 5/5   EHL 5/5 Crispin 5/5            No pronator drift  Tone: Normal  Atrophy/Fasciculations: None  Sensation: Normal to light touch, symmetric, no neglect  Cerebellar: Normal finger nose finger  Gait: Normal, nondistressed heel toe tandem gait      Reflexes: 2+ throughout, symmetric, no Ophelia's    IMAGING:  MR cervical 3/21/2024: No associated cord signal abnormality or stenosis of the central canal of the level of the craniocervical junction.  Advanced multilevel degenerative changes throughout the cervical spine.  There is stable C3-4 stenosis right sided foraminal stenosis, which is asymptomatic.  Nonunion of C1 fracture, stable displacement.    ASSESSMENT:  68 yoF s/p fall resulting in a C1 burst fracture involving the anterior and posterior arches of C1. Initially this fracture had no complicated radiographic features as there was no significant overhang of the lateral masses of C1 on C2.  Additionally, her neck pain had improved and she had remained neurologically intact.  For all the above reasons, this fracture was managed without a cervical collar.  Unfortunately, in the interim she had a subsequent fall with head trauma and neck extension.  Subsequent imaging demonstrated widening of the fracture gaps along with increasing overhang at the C1-2 joint on the right.  This prompted recommending use of cervical collar at all times.  An MRI of the cervical spine demonstrated no neural element compression.  Her presentation is a complex one as she has remained pain-free.  She also remains neurologically intact.  She again had a CT, that unfortunately showed no significant healing of her fracture with stable  displacement and overhang of the C1 and C2 lateral masses.  We lengthy discussions regarding her imaging findings and went over in detail with her and her .  At this point, I do not think this is going to heal with conservative managements.  Despite her lack of pain, she remains very aware and concerned about not wearing her cervical collar.  After discussing all her options, will they have elected for occiput to C2 fixation and fusion to definitively treat her fracture and protect her neurologic condition.  Her updated MRI demonstrates no neural element compromise and nonunion of her cervical fracture.  Will proceed as planned on 4/9/2024.  I answered all their questions.     Plan:  - O-C2 fixation and fusion after updated MRI cervical    Bartolo King MD  Neurological Surgery    [address], [address]  [place] [id number]  [phone]  Pager [phone]  4/4/2024 12:44 PM      This note was created using a voice-recognition transcribing system. Incorrect words or phrases may have been missed during proofreading. Please interpret accordingly.    Total Time    Established Patient Total Time       30  minutes.      Activities       Preparing to see the patient (chart/tests/imaging review).       Obtaining and/or reviewing separately obtained history.       Performing a medically appropriate examination and/or evaluation.       Counseling and educating the patient/family/caregiver.       Ordering medications, tests, or procedures.       Referring and communicating with other health care professionals (when not separately reported).       Documenting clincal information in the electronic or other health record.       Independently interpreting results (not separately reported).    Communicating results to the patient/family/caregiver.    Care coordination (not separately reported).

## 2024-04-07 ENCOUNTER — LAB ENCOUNTER (OUTPATIENT)
Dept: LAB | Facility: HOSPITAL | Age: 69
End: 2024-04-07
Attending: STUDENT IN AN ORGANIZED HEALTH CARE EDUCATION/TRAINING PROGRAM
Payer: COMMERCIAL

## 2024-04-07 DIAGNOSIS — S12.000A CLOSED DISPLACED FRACTURE OF FIRST CERVICAL VERTEBRA, UNSPECIFIED FRACTURE MORPHOLOGY, INITIAL ENCOUNTER (HCC): ICD-10-CM

## 2024-04-07 LAB — SARS-COV-2 RNA RESP QL NAA+PROBE: NOT DETECTED

## 2024-04-07 PROCEDURE — 87635 SARS-COV-2 COVID-19 AMP PRB: CPT

## 2024-04-09 ENCOUNTER — APPOINTMENT (OUTPATIENT)
Dept: CT IMAGING | Facility: HOSPITAL | Age: 69
End: 2024-04-09
Attending: STUDENT IN AN ORGANIZED HEALTH CARE EDUCATION/TRAINING PROGRAM
Payer: COMMERCIAL

## 2024-04-09 ENCOUNTER — APPOINTMENT (OUTPATIENT)
Dept: GENERAL RADIOLOGY | Facility: HOSPITAL | Age: 69
End: 2024-04-09
Attending: STUDENT IN AN ORGANIZED HEALTH CARE EDUCATION/TRAINING PROGRAM
Payer: COMMERCIAL

## 2024-04-09 ENCOUNTER — ANESTHESIA (OUTPATIENT)
Dept: SURGERY | Facility: HOSPITAL | Age: 69
End: 2024-04-09
Payer: COMMERCIAL

## 2024-04-09 ENCOUNTER — HOSPITAL ENCOUNTER (INPATIENT)
Facility: HOSPITAL | Age: 69
LOS: 2 days | Discharge: HOME | End: 2024-04-11
Attending: STUDENT IN AN ORGANIZED HEALTH CARE EDUCATION/TRAINING PROGRAM | Admitting: STUDENT IN AN ORGANIZED HEALTH CARE EDUCATION/TRAINING PROGRAM
Payer: COMMERCIAL

## 2024-04-09 ENCOUNTER — ANESTHESIA EVENT (OUTPATIENT)
Dept: SURGERY | Facility: HOSPITAL | Age: 69
End: 2024-04-09
Payer: COMMERCIAL

## 2024-04-09 DIAGNOSIS — S12.000A CLOSED DISPLACED FRACTURE OF FIRST CERVICAL VERTEBRA, UNSPECIFIED FRACTURE MORPHOLOGY, INITIAL ENCOUNTER (HCC): ICD-10-CM

## 2024-04-09 DIAGNOSIS — Z98.1 S/P CERVICAL SPINAL FUSION: Primary | ICD-10-CM

## 2024-04-09 LAB
ANION GAP SERPL CALC-SCNC: 5 MMOL/L (ref 0–18)
BUN BLD-MCNC: 9 MG/DL (ref 9–23)
BUN/CREAT SERPL: 14.1 (ref 10–20)
CALCIUM BLD-MCNC: 8.5 MG/DL (ref 8.7–10.4)
CHLORIDE SERPL-SCNC: 106 MMOL/L (ref 98–112)
CO2 SERPL-SCNC: 25 MMOL/L (ref 21–32)
CREAT BLD-MCNC: 0.64 MG/DL
DIRECT COOMBS POLY: NEGATIVE
EGFRCR SERPLBLD CKD-EPI 2021: 97 ML/MIN/1.73M2 (ref 60–?)
GLUCOSE BLD-MCNC: 164 MG/DL (ref 70–99)
HCT VFR BLD AUTO: 35.6 %
HGB BLD-MCNC: 12.7 G/DL
OSMOLALITY SERPL CALC.SUM OF ELEC: 284 MOSM/KG (ref 275–295)
POCT HEMOCUE: 12.1 G/DL (ref 12–16)
POTASSIUM SERPL-SCNC: 3.2 MMOL/L (ref 3.5–5.1)
SODIUM SERPL-SCNC: 136 MMOL/L (ref 136–145)

## 2024-04-09 PROCEDURE — 70496 CT ANGIOGRAPHY HEAD: CPT | Performed by: STUDENT IN AN ORGANIZED HEALTH CARE EDUCATION/TRAINING PROGRAM

## 2024-04-09 PROCEDURE — 4A11X4G MONITORING OF PERIPHERAL NERVOUS ELECTRICAL ACTIVITY, INTRAOPERATIVE, EXTERNAL APPROACH: ICD-10-PCS | Performed by: STUDENT IN AN ORGANIZED HEALTH CARE EDUCATION/TRAINING PROGRAM

## 2024-04-09 PROCEDURE — 76000 FLUOROSCOPY <1 HR PHYS/QHP: CPT | Performed by: STUDENT IN AN ORGANIZED HEALTH CARE EDUCATION/TRAINING PROGRAM

## 2024-04-09 PROCEDURE — 0PH304Z INSERTION OF INTERNAL FIXATION DEVICE INTO CERVICAL VERTEBRA, OPEN APPROACH: ICD-10-PCS | Performed by: STUDENT IN AN ORGANIZED HEALTH CARE EDUCATION/TRAINING PROGRAM

## 2024-04-09 PROCEDURE — 0RG20K1 FUSION OF 2 OR MORE CERVICAL VERTEBRAL JOINTS WITH NONAUTOLOGOUS TISSUE SUBSTITUTE, POSTERIOR APPROACH, POSTERIOR COLUMN, OPEN APPROACH: ICD-10-PCS | Performed by: STUDENT IN AN ORGANIZED HEALTH CARE EDUCATION/TRAINING PROGRAM

## 2024-04-09 PROCEDURE — 99223 1ST HOSP IP/OBS HIGH 75: CPT | Performed by: INTERNAL MEDICINE

## 2024-04-09 PROCEDURE — 70498 CT ANGIOGRAPHY NECK: CPT | Performed by: STUDENT IN AN ORGANIZED HEALTH CARE EDUCATION/TRAINING PROGRAM

## 2024-04-09 DEVICE — AGENT HEMSTAT 2X4IN HUM FIBRIN HUM THROM COMP: Type: IMPLANTABLE DEVICE | Site: NECK | Status: FUNCTIONAL

## 2024-04-09 DEVICE — SET SCREW 3600215 M6 SETSCREW
Type: IMPLANTABLE DEVICE | Site: NECK | Status: FUNCTIONAL
Brand: INFINITY™ OCCIPITOCERVICAL UPPER THORACIC SYSTEM

## 2024-04-09 DEVICE — MAGNETOS EASYPACK PUTTY 2.5CC 1-2MM USA
Type: IMPLANTABLE DEVICE | Site: NECK | Status: FUNCTIONAL
Brand: MAGNETOS

## 2024-04-09 RX ORDER — SODIUM CHLORIDE 9 MG/ML
INJECTION, SOLUTION INTRAVENOUS CONTINUOUS PRN
Status: DISCONTINUED | OUTPATIENT
Start: 2024-04-09 | End: 2024-04-09 | Stop reason: SURG

## 2024-04-09 RX ORDER — ENOXAPARIN SODIUM 100 MG/ML
40 INJECTION SUBCUTANEOUS DAILY
Status: DISCONTINUED | OUTPATIENT
Start: 2024-04-10 | End: 2024-04-11

## 2024-04-09 RX ORDER — CEFAZOLIN SODIUM/WATER 2 G/20 ML
2 SYRINGE (ML) INTRAVENOUS ONCE
Status: COMPLETED | OUTPATIENT
Start: 2024-04-09 | End: 2024-04-09

## 2024-04-09 RX ORDER — METHOCARBAMOL 500 MG/1
500 TABLET, FILM COATED ORAL EVERY 6 HOURS
Status: DISCONTINUED | OUTPATIENT
Start: 2024-04-09 | End: 2024-04-11

## 2024-04-09 RX ORDER — MORPHINE SULFATE 4 MG/ML
2 INJECTION, SOLUTION INTRAMUSCULAR; INTRAVENOUS EVERY 10 MIN PRN
Status: DISCONTINUED | OUTPATIENT
Start: 2024-04-09 | End: 2024-04-09 | Stop reason: HOSPADM

## 2024-04-09 RX ORDER — BUPIVACAINE HYDROCHLORIDE 2.5 MG/ML
INJECTION, SOLUTION EPIDURAL; INFILTRATION; INTRACAUDAL AS NEEDED
Status: DISCONTINUED | OUTPATIENT
Start: 2024-04-09 | End: 2024-04-09 | Stop reason: HOSPADM

## 2024-04-09 RX ORDER — ENEMA 19; 7 G/133ML; G/133ML
1 ENEMA RECTAL ONCE AS NEEDED
Status: DISCONTINUED | OUTPATIENT
Start: 2024-04-09 | End: 2024-04-11

## 2024-04-09 RX ORDER — GLYCOPYRROLATE 0.2 MG/ML
INJECTION, SOLUTION INTRAMUSCULAR; INTRAVENOUS AS NEEDED
Status: DISCONTINUED | OUTPATIENT
Start: 2024-04-09 | End: 2024-04-09 | Stop reason: SURG

## 2024-04-09 RX ORDER — HYDRALAZINE HYDROCHLORIDE 20 MG/ML
10 INJECTION INTRAMUSCULAR; INTRAVENOUS
Status: DISCONTINUED | OUTPATIENT
Start: 2024-04-09 | End: 2024-04-11

## 2024-04-09 RX ORDER — FAMOTIDINE 20 MG/1
20 TABLET, FILM COATED ORAL ONCE
Status: DISCONTINUED | OUTPATIENT
Start: 2024-04-09 | End: 2024-04-09 | Stop reason: HOSPADM

## 2024-04-09 RX ORDER — SODIUM CHLORIDE, SODIUM LACTATE, POTASSIUM CHLORIDE, CALCIUM CHLORIDE 600; 310; 30; 20 MG/100ML; MG/100ML; MG/100ML; MG/100ML
INJECTION, SOLUTION INTRAVENOUS CONTINUOUS
Status: DISCONTINUED | OUTPATIENT
Start: 2024-04-09 | End: 2024-04-09 | Stop reason: HOSPADM

## 2024-04-09 RX ORDER — BISACODYL 10 MG
10 SUPPOSITORY, RECTAL RECTAL
Status: DISCONTINUED | OUTPATIENT
Start: 2024-04-09 | End: 2024-04-11

## 2024-04-09 RX ORDER — ROCURONIUM BROMIDE 10 MG/ML
INJECTION, SOLUTION INTRAVENOUS AS NEEDED
Status: DISCONTINUED | OUTPATIENT
Start: 2024-04-09 | End: 2024-04-09 | Stop reason: SURG

## 2024-04-09 RX ORDER — PHENYLEPHRINE HCL 10 MG/ML
VIAL (ML) INJECTION AS NEEDED
Status: DISCONTINUED | OUTPATIENT
Start: 2024-04-09 | End: 2024-04-09 | Stop reason: SURG

## 2024-04-09 RX ORDER — FAMOTIDINE 10 MG/ML
20 INJECTION, SOLUTION INTRAVENOUS ONCE
Status: DISCONTINUED | OUTPATIENT
Start: 2024-04-09 | End: 2024-04-09 | Stop reason: HOSPADM

## 2024-04-09 RX ORDER — SENNOSIDES 8.6 MG
17.2 TABLET ORAL NIGHTLY
Status: DISCONTINUED | OUTPATIENT
Start: 2024-04-09 | End: 2024-04-11

## 2024-04-09 RX ORDER — ASPIRIN 81 MG/1
81 TABLET, CHEWABLE ORAL DAILY
Status: DISCONTINUED | OUTPATIENT
Start: 2024-04-10 | End: 2024-04-11

## 2024-04-09 RX ORDER — MIDAZOLAM HYDROCHLORIDE 1 MG/ML
INJECTION INTRAMUSCULAR; INTRAVENOUS AS NEEDED
Status: DISCONTINUED | OUTPATIENT
Start: 2024-04-09 | End: 2024-04-09 | Stop reason: SURG

## 2024-04-09 RX ORDER — DIPHENHYDRAMINE HYDROCHLORIDE 50 MG/ML
25 INJECTION INTRAMUSCULAR; INTRAVENOUS EVERY 4 HOURS PRN
Status: DISCONTINUED | OUTPATIENT
Start: 2024-04-09 | End: 2024-04-11

## 2024-04-09 RX ORDER — HYDROMORPHONE HYDROCHLORIDE 1 MG/ML
0.2 INJECTION, SOLUTION INTRAMUSCULAR; INTRAVENOUS; SUBCUTANEOUS EVERY 5 MIN PRN
Status: DISCONTINUED | OUTPATIENT
Start: 2024-04-09 | End: 2024-04-09 | Stop reason: HOSPADM

## 2024-04-09 RX ORDER — MONTELUKAST SODIUM 10 MG/1
10 TABLET ORAL NIGHTLY
Status: DISCONTINUED | OUTPATIENT
Start: 2024-04-09 | End: 2024-04-11

## 2024-04-09 RX ORDER — HYDROMORPHONE HYDROCHLORIDE 1 MG/ML
0.4 INJECTION, SOLUTION INTRAMUSCULAR; INTRAVENOUS; SUBCUTANEOUS EVERY 2 HOUR PRN
Status: DISCONTINUED | OUTPATIENT
Start: 2024-04-09 | End: 2024-04-11

## 2024-04-09 RX ORDER — VANCOMYCIN HYDROCHLORIDE 1 G/20ML
INJECTION, POWDER, LYOPHILIZED, FOR SOLUTION INTRAVENOUS AS NEEDED
Status: DISCONTINUED | OUTPATIENT
Start: 2024-04-09 | End: 2024-04-09 | Stop reason: HOSPADM

## 2024-04-09 RX ORDER — NALOXONE HYDROCHLORIDE 0.4 MG/ML
0.08 INJECTION, SOLUTION INTRAMUSCULAR; INTRAVENOUS; SUBCUTANEOUS AS NEEDED
Status: DISCONTINUED | OUTPATIENT
Start: 2024-04-09 | End: 2024-04-09 | Stop reason: HOSPADM

## 2024-04-09 RX ORDER — OXYCODONE HYDROCHLORIDE 5 MG/1
2.5 TABLET ORAL EVERY 4 HOURS PRN
Status: DISCONTINUED | OUTPATIENT
Start: 2024-04-09 | End: 2024-04-11

## 2024-04-09 RX ORDER — POLYETHYLENE GLYCOL 3350 17 G/17G
17 POWDER, FOR SOLUTION ORAL DAILY PRN
Status: DISCONTINUED | OUTPATIENT
Start: 2024-04-09 | End: 2024-04-11

## 2024-04-09 RX ORDER — METOCLOPRAMIDE HYDROCHLORIDE 5 MG/ML
10 INJECTION INTRAMUSCULAR; INTRAVENOUS EVERY 8 HOURS PRN
Status: DISCONTINUED | OUTPATIENT
Start: 2024-04-09 | End: 2024-04-09 | Stop reason: HOSPADM

## 2024-04-09 RX ORDER — ONDANSETRON 2 MG/ML
4 INJECTION INTRAMUSCULAR; INTRAVENOUS EVERY 6 HOURS PRN
Status: DISCONTINUED | OUTPATIENT
Start: 2024-04-09 | End: 2024-04-11

## 2024-04-09 RX ORDER — HYDROMORPHONE HYDROCHLORIDE 1 MG/ML
0.6 INJECTION, SOLUTION INTRAMUSCULAR; INTRAVENOUS; SUBCUTANEOUS EVERY 5 MIN PRN
Status: DISCONTINUED | OUTPATIENT
Start: 2024-04-09 | End: 2024-04-09 | Stop reason: HOSPADM

## 2024-04-09 RX ORDER — LOSARTAN POTASSIUM 100 MG/1
100 TABLET ORAL DAILY
Status: DISCONTINUED | OUTPATIENT
Start: 2024-04-10 | End: 2024-04-11

## 2024-04-09 RX ORDER — SODIUM CHLORIDE 9 MG/ML
INJECTION, SOLUTION INTRAVENOUS CONTINUOUS
Status: DISCONTINUED | OUTPATIENT
Start: 2024-04-09 | End: 2024-04-11

## 2024-04-09 RX ORDER — LIDOCAINE HYDROCHLORIDE 20 MG/ML
INJECTION, SOLUTION EPIDURAL; INFILTRATION; INTRACAUDAL; PERINEURAL AS NEEDED
Status: DISCONTINUED | OUTPATIENT
Start: 2024-04-09 | End: 2024-04-09 | Stop reason: SURG

## 2024-04-09 RX ORDER — METOCLOPRAMIDE 10 MG/1
10 TABLET ORAL ONCE
Status: COMPLETED | OUTPATIENT
Start: 2024-04-09 | End: 2024-04-09

## 2024-04-09 RX ORDER — METOCLOPRAMIDE HYDROCHLORIDE 5 MG/ML
10 INJECTION INTRAMUSCULAR; INTRAVENOUS ONCE
Status: COMPLETED | OUTPATIENT
Start: 2024-04-09 | End: 2024-04-09

## 2024-04-09 RX ORDER — DEXAMETHASONE SODIUM PHOSPHATE 4 MG/ML
VIAL (ML) INJECTION AS NEEDED
Status: DISCONTINUED | OUTPATIENT
Start: 2024-04-09 | End: 2024-04-09 | Stop reason: SURG

## 2024-04-09 RX ORDER — LABETALOL HYDROCHLORIDE 5 MG/ML
INJECTION, SOLUTION INTRAVENOUS AS NEEDED
Status: DISCONTINUED | OUTPATIENT
Start: 2024-04-09 | End: 2024-04-09 | Stop reason: SURG

## 2024-04-09 RX ORDER — DIPHENHYDRAMINE HCL 25 MG
25 CAPSULE ORAL EVERY 4 HOURS PRN
Status: DISCONTINUED | OUTPATIENT
Start: 2024-04-09 | End: 2024-04-11

## 2024-04-09 RX ORDER — SODIUM CHLORIDE, SODIUM LACTATE, POTASSIUM CHLORIDE, CALCIUM CHLORIDE 600; 310; 30; 20 MG/100ML; MG/100ML; MG/100ML; MG/100ML
INJECTION, SOLUTION INTRAVENOUS CONTINUOUS
Status: DISCONTINUED | OUTPATIENT
Start: 2024-04-09 | End: 2024-04-11

## 2024-04-09 RX ORDER — LABETALOL HYDROCHLORIDE 5 MG/ML
10 INJECTION, SOLUTION INTRAVENOUS ONCE
Status: COMPLETED | OUTPATIENT
Start: 2024-04-09 | End: 2024-04-09

## 2024-04-09 RX ORDER — SCOLOPAMINE TRANSDERMAL SYSTEM 1 MG/1
1 PATCH, EXTENDED RELEASE TRANSDERMAL ONCE
Status: DISCONTINUED | OUTPATIENT
Start: 2024-04-09 | End: 2024-04-09

## 2024-04-09 RX ORDER — ACETAMINOPHEN 10 MG/ML
1000 INJECTION, SOLUTION INTRAVENOUS EVERY 6 HOURS
Status: DISCONTINUED | OUTPATIENT
Start: 2024-04-09 | End: 2024-04-11

## 2024-04-09 RX ORDER — DOCUSATE SODIUM 100 MG/1
100 CAPSULE, LIQUID FILLED ORAL 2 TIMES DAILY
Status: DISCONTINUED | OUTPATIENT
Start: 2024-04-09 | End: 2024-04-11

## 2024-04-09 RX ORDER — OXYCODONE HYDROCHLORIDE 5 MG/1
5 TABLET ORAL EVERY 4 HOURS PRN
Status: DISCONTINUED | OUTPATIENT
Start: 2024-04-09 | End: 2024-04-11

## 2024-04-09 RX ORDER — PANTOPRAZOLE SODIUM 20 MG/1
20 TABLET, DELAYED RELEASE ORAL EVERY MORNING
Status: DISCONTINUED | OUTPATIENT
Start: 2024-04-10 | End: 2024-04-11

## 2024-04-09 RX ORDER — CETIRIZINE HYDROCHLORIDE 5 MG/1
10 TABLET ORAL DAILY
Status: DISCONTINUED | OUTPATIENT
Start: 2024-04-10 | End: 2024-04-11

## 2024-04-09 RX ORDER — ATORVASTATIN CALCIUM 10 MG/1
10 TABLET, FILM COATED ORAL DAILY
Status: DISCONTINUED | OUTPATIENT
Start: 2024-04-10 | End: 2024-04-11

## 2024-04-09 RX ORDER — MORPHINE SULFATE 10 MG/ML
6 INJECTION, SOLUTION INTRAMUSCULAR; INTRAVENOUS EVERY 10 MIN PRN
Status: DISCONTINUED | OUTPATIENT
Start: 2024-04-09 | End: 2024-04-09 | Stop reason: HOSPADM

## 2024-04-09 RX ORDER — LABETALOL HYDROCHLORIDE 5 MG/ML
10 INJECTION, SOLUTION INTRAVENOUS EVERY 10 MIN PRN
Status: DISCONTINUED | OUTPATIENT
Start: 2024-04-09 | End: 2024-04-11

## 2024-04-09 RX ORDER — HYDROMORPHONE HYDROCHLORIDE 1 MG/ML
0.4 INJECTION, SOLUTION INTRAMUSCULAR; INTRAVENOUS; SUBCUTANEOUS EVERY 5 MIN PRN
Status: DISCONTINUED | OUTPATIENT
Start: 2024-04-09 | End: 2024-04-09 | Stop reason: HOSPADM

## 2024-04-09 RX ORDER — METOCLOPRAMIDE HYDROCHLORIDE 5 MG/ML
10 INJECTION INTRAMUSCULAR; INTRAVENOUS EVERY 8 HOURS PRN
Status: DISCONTINUED | OUTPATIENT
Start: 2024-04-09 | End: 2024-04-11

## 2024-04-09 RX ORDER — ACETAMINOPHEN 500 MG
1000 TABLET ORAL ONCE
Status: DISCONTINUED | OUTPATIENT
Start: 2024-04-09 | End: 2024-04-09 | Stop reason: HOSPADM

## 2024-04-09 RX ORDER — MORPHINE SULFATE 4 MG/ML
4 INJECTION, SOLUTION INTRAMUSCULAR; INTRAVENOUS EVERY 10 MIN PRN
Status: DISCONTINUED | OUTPATIENT
Start: 2024-04-09 | End: 2024-04-09 | Stop reason: HOSPADM

## 2024-04-09 RX ORDER — ONDANSETRON 2 MG/ML
4 INJECTION INTRAMUSCULAR; INTRAVENOUS EVERY 6 HOURS PRN
Status: DISCONTINUED | OUTPATIENT
Start: 2024-04-09 | End: 2024-04-09 | Stop reason: HOSPADM

## 2024-04-09 RX ORDER — ONDANSETRON 2 MG/ML
INJECTION INTRAMUSCULAR; INTRAVENOUS AS NEEDED
Status: DISCONTINUED | OUTPATIENT
Start: 2024-04-09 | End: 2024-04-09 | Stop reason: SURG

## 2024-04-09 RX ORDER — ASPIRIN 300 MG/1
300 SUPPOSITORY RECTAL DAILY
Status: DISCONTINUED | OUTPATIENT
Start: 2024-04-09 | End: 2024-04-09 | Stop reason: HOSPADM

## 2024-04-09 RX ORDER — HYDROMORPHONE HYDROCHLORIDE 1 MG/ML
0.2 INJECTION, SOLUTION INTRAMUSCULAR; INTRAVENOUS; SUBCUTANEOUS EVERY 2 HOUR PRN
Status: DISCONTINUED | OUTPATIENT
Start: 2024-04-09 | End: 2024-04-11

## 2024-04-09 RX ADMIN — PHENYLEPHRINE HCL 100 MCG: 10 MG/ML VIAL (ML) INJECTION at 14:15:00

## 2024-04-09 RX ADMIN — LABETALOL HYDROCHLORIDE 2.5 MG: 5 INJECTION, SOLUTION INTRAVENOUS at 12:22:00

## 2024-04-09 RX ADMIN — SODIUM CHLORIDE, SODIUM LACTATE, POTASSIUM CHLORIDE, CALCIUM CHLORIDE: 600; 310; 30; 20 INJECTION, SOLUTION INTRAVENOUS at 14:00:00

## 2024-04-09 RX ADMIN — ROCURONIUM BROMIDE 5 MG: 10 INJECTION, SOLUTION INTRAVENOUS at 10:16:00

## 2024-04-09 RX ADMIN — ONDANSETRON 4 MG: 2 INJECTION INTRAMUSCULAR; INTRAVENOUS at 14:28:00

## 2024-04-09 RX ADMIN — DEXAMETHASONE SODIUM PHOSPHATE 8 MG: 4 MG/ML VIAL (ML) INJECTION at 11:22:00

## 2024-04-09 RX ADMIN — CEFAZOLIN SODIUM/WATER 2 G: 2 G/20 ML SYRINGE (ML) INTRAVENOUS at 10:47:00

## 2024-04-09 RX ADMIN — CEFAZOLIN SODIUM/WATER 2 G: 2 G/20 ML SYRINGE (ML) INTRAVENOUS at 14:35:00

## 2024-04-09 RX ADMIN — SODIUM CHLORIDE, SODIUM LACTATE, POTASSIUM CHLORIDE, CALCIUM CHLORIDE: 600; 310; 30; 20 INJECTION, SOLUTION INTRAVENOUS at 10:12:00

## 2024-04-09 RX ADMIN — PHENYLEPHRINE HCL 50 MCG: 10 MG/ML VIAL (ML) INJECTION at 14:28:00

## 2024-04-09 RX ADMIN — LIDOCAINE HYDROCHLORIDE 80 MG: 20 INJECTION, SOLUTION EPIDURAL; INFILTRATION; INTRACAUDAL; PERINEURAL at 10:16:00

## 2024-04-09 RX ADMIN — MIDAZOLAM HYDROCHLORIDE 2 MG: 1 INJECTION INTRAMUSCULAR; INTRAVENOUS at 10:12:00

## 2024-04-09 RX ADMIN — SODIUM CHLORIDE: 9 INJECTION, SOLUTION INTRAVENOUS at 10:27:00

## 2024-04-09 RX ADMIN — GLYCOPYRROLATE 0.2 MG: 0.2 INJECTION, SOLUTION INTRAMUSCULAR; INTRAVENOUS at 10:16:00

## 2024-04-09 RX ADMIN — PHENYLEPHRINE HCL 50 MCG: 10 MG/ML VIAL (ML) INJECTION at 14:49:00

## 2024-04-09 NOTE — ANESTHESIA PROCEDURE NOTES
Peripheral IV  Date/Time: 4/9/2024 12:07 PM  Inserted by: Ava Ho CRNA    Placement  Needle size: 20 G  Laterality: left  Location: forearm  Local anesthetic: none (under anesthesia)  Site prep: alcohol  Technique: anatomical landmarks  Attempts: 1

## 2024-04-09 NOTE — INTERVAL H&P NOTE
Pre-op Diagnosis: Closed displaced fracture of first cervical vertebra, unspecified fracture morphology, initial encounter ([facility]) [S12.000A]    The H&P by Dr. Neal was reviewed by John Lundberg PA-C on 3/18/2024. The patient was examined and no significant changes have occurred in the patient's condition since the H&P was performed.  Dr. King discussed with the patient and/or legal representative the potential benefits, risks and side effects of this procedure; the likelihood of the patient achieving goals; and potential problems that might occur during recuperation.  Dr. King discussed reasonable alternatives to the procedure, including risks, benefits and side effects related to the alternatives and risks related to not receiving this procedure.  We will proceed with procedure as planned. No questions at this time. Deferred marking of patient due to cervical collar and unstable fracture. No laterality associated with procedure.

## 2024-04-09 NOTE — ANESTHESIA PREPROCEDURE EVALUATION
Anesthesia PreOp Note    HPI:     Miranda Oliveros is a 68 year old female who presents for preoperative consultation requested by: Bartolo King MD    Date of Surgery: 4/9/2024    Procedure(s):  Occiput to cervical 2 posterior fixation and fusion, possible decompressive cervical 1 and 2 laminectomies  POSTERIOR CERVICAL LAMINECTOMY FORAMINOTOMY 1 LEV  Indication: Closed displaced fracture of first cervical vertebra, unspecified fracture morphology, initial encounter (Formerly [facility]) [S12.000A]    Relevant Problems   No relevant active problems       NPO:  Last Liquid Consumption Date: 04/09/24  Last Liquid Consumption Time: 0630  Last Solid Consumption Date: 04/08/24  Last Solid Consumption Time: 2000  Last Liquid Consumption Date: 04/09/24          History Review:  Patient Active Problem List    Diagnosis Date Noted    Closed displaced fracture of first cervical vertebra, unspecified fracture morphology, initial encounter (Formerly [facility]) 08/18/2023    Closed nondisplaced posterior arch fracture of first cervical vertebra (Formerly [facility]) 07/22/2023    Hyponatremia 07/21/2023    Hypokalemia 07/21/2023    Hyperglycemia 07/21/2023    Azotemia 07/21/2023    Concussion with unknown loss of consciousness status, initial encounter 07/21/2023    Injury of head, initial encounter 07/21/2023    Strain of neck muscle, initial encounter 07/21/2023    Cellulitis 04/26/2021    Aortic atherosclerosis (Formerly [facility]) 08/09/2019    Seasonal allergic rhinitis due to pollen 06/26/2018    Borderline hypertension 06/26/2018    BMI 35.0-35.9,adult 06/26/2018    Personal history of colonic polyps 06/27/2017    Status post total right knee replacement 04/19/2017    Encounter for monitoring long-term proton pump inhibitor therapy 04/26/2016    Vitamin D deficiency 04/26/2016    Vitamin B 12 deficiency 04/26/2016    GERD (gastroesophageal reflux disease) 02/13/2015    Pure hypercholesterolemia 01/28/2009    Family history of ischemic heart disease 01/28/2009       Past  Medical History:   Diagnosis Date    Allergic rhinitis due to pollen     Carcinoma in situ of cervix uteri 1986    Cataract     Esophageal reflux     High blood pressure     High cholesterol     Hyperlipidemia     Menopause 2006    Osteoarthritis     generalized    Pure hypercholesterolemia     Rosacea     Tubular adenoma of colon 2012    Visual impairment     glasses       Past Surgical History:   Procedure Laterality Date              COLONOSCOPY N/A 2021    Procedure: COLONOSCOPY with polypectomy;  Surgeon: Sivakumar Benson MD;  Location: [facility]    COLONOSCOPY,BIOPSY  2012    Procedure: COLONOSCOPY, POSSIBLE BIOPSY, POSSIBLE POLYPECTOMY 21971;  Surgeon: Sivakumar Benson MD;  Location: [facility]    COLONOSCOPY,DIAGNOSTIC N/A 2016    Procedure: COLONOSCOPY, POSSIBLE BIOPSY, POSSIBLE POLYPECTOMY 81784;  Surgeon: Sivakumar Benson MD;  Location: [facility]    CT HEART W/ CALCIUM SCORING  2009    calcium score of 0    CT HEART W/ CALCIUM SCORING N/A 2019    calcium score is zero    IMPACT TOOTH REM BONY W/COMP Bilateral 1973    wisdom teeth    KNEE REPLACEMENT SURGERY Right 2017    right    LASER SURGERY OF CERVIX  1986    PERIPHERAL VASCULAR SCREENING HISTORICAL CONV Bilateral 2016    PAD screen is normal    TOTAL KNEE REPLACEMENT Right 2017       Medications Prior to Admission   Medication Sig Dispense Refill Last Dose    losartan 100 MG Oral Tab    2024 at 800    Vitamin C 500 MG Oral Tab Take 1 tablet (500 mg total) by mouth daily.   2024    acetaminophen 325 MG Oral Tab Take 2 tablets (650 mg total) by mouth every 4 (four) hours as needed.  0 2024 at 630    montelukast 10 MG Oral Tab Take 1 tablet (10 mg total) by mouth nightly. 90 tablet 3 2024 at 2230    atorvastatin 10 MG Oral Tab TAKE 1 TABLET(10 MG) BY MOUTH EVERY DAY 90 tablet 3 2024 at 630    Meloxicam 15 MG Oral  Tab TAKE 1 TABLET(15 MG) BY MOUTH DAILY 90 tablet 3 4/2/2024    Cyanocobalamin (VITAMIN B 12) 100 MCG Oral Lozenge Take 1,000 mcg by mouth daily.   30 lozenge 0 4/2/2024    Vitamin D3 2000 units Oral Cap Take 2 capsules (4,000 Units total) by mouth daily. 180 capsule 3 4/2/2024    cetirizine 10 MG Oral Tab Take 1 tablet (10 mg total) by mouth daily.   4/9/2024 at 630    Omeprazole Magnesium 20 MG Oral Tab EC Take 1 tablet (20 mg total) by mouth every morning. 28 tablet 0 4/9/2024 at 630     Current Facility-Administered Medications Ordered in Epic   Medication Dose Route Frequency Provider Last Rate Last Admin    lactated ringers infusion   Intravenous Continuous Bartolo King MD 20 mL/hr at 04/09/24 0912 New Bag at 04/09/24 0912    acetaminophen (Tylenol Extra Strength) tab 1,000 mg  1,000 mg Oral Once Bartolo King MD        famotidine (Pepcid) tab 20 mg  20 mg Oral Once Bartolo King MD        Or    famotidine (Pepcid) 20 mg/2mL injection 20 mg  20 mg Intravenous Once Bartolo King MD        ceFAZolin (Ancef) 2 g in 20mL IV syringe premix  2 g Intravenous Once Bartolo King MD        scopolamine (Transderm-Scop) 1 MG/3DAYS patch 1 patch  1 patch Transdermal Once Jacob Rivera MD         No current [facility]-ordered outpatient medications on file.       Allergies   Allergen Reactions    Amoxicillin-Pot Clavulanate RASH     No peeling/ blisters  No organ damage       Family History   Problem Relation Age of Onset    Diabetes Father     Heart Disease Father     Heart Surgery Father 55        CABGx4    Ear Problems Father         hearing loss    Heart Disorder Father     Hypertension Father     Cancer Mother 74        colon    Cataracts Mother     Hypertension Mother     Eye Problems Maternal Grandmother         macular degeneration    High Cholesterol Maternal Grandmother     Musculo-skelatal Disorder Maternal Grandmother         osteoporosis    Heart Disease Maternal Grandmother         CHf     Pulmonary Disease Maternal Grandmother         COPD    Lipids Maternal Grandmother     Alcohol and Other Disorders Associated Maternal Grandfather     Dementia Paternal Grandmother         Alzheimers    Neurological Disorder Paternal Grandmother         Parkinson's    Diabetes Paternal Grandfather     Heart Disorder Paternal Grandfather         CHF    Cancer Paternal Grandfather     High Cholesterol Sister     Hypertension Sister     Diabetes Sister     Clotting Disorder Sister         DVT    Obesity Sister     Kidney Disease Sister         stones    Arthritis Sister         TKA    Breast Cancer Sister 56        56    Other (Other) Sister         PMR    Breast Cancer Maternal Great-Grandmother      Social History     Socioeconomic History    Marital status:      Spouse name: Humberto    Number of children: 3    Years of education: 14   Occupational History    Occupation: Bank Asst Manager     Employer: [employer]   Tobacco Use    Smoking status: Former     Packs/day: 1.00     Years: 6.00     Additional pack years: 0.00     Total pack years: 6.00     Types: Cigarettes     Quit date: 1983     Years since quittin.2    Smokeless tobacco: Never   Vaping Use    Vaping Use: Never used   Substance and Sexual Activity    Alcohol use: Yes     Alcohol/week: 7.0 standard drinks of alcohol     Types: 7 Standard drinks or equivalent per week     Comment: 2 hard seltzers daily    Drug use: No    Sexual activity: Yes     Partners: Male   Other Topics Concern     Service No    Blood Transfusions No    Caffeine Concern No    Occupational Exposure No    Hobby Hazards No    Sleep Concern No    Stress Concern No    Weight Concern No    Special Diet No    Back Care No    Exercise No    Bike Helmet No    Seat Belt No    Self-Exams Yes       Available pre-op labs reviewed.             Vital Signs:  Body mass index is 31.35 kg/m².   height is 1.6 m (5' 3\") and weight is 80.3 kg (177 lb). Her oral temperature is  98.4 °F (36.9 °C). Her blood pressure is 150/68 and her pulse is 85. Her respiration is 16 and oxygen saturation is 100%.   Vitals:    04/03/24 1025 04/09/24 0856   BP:  150/68   Pulse:  85   Resp:  16   Temp:  98.4 °F (36.9 °C)   TempSrc:  Oral   SpO2:  100%   Weight: 80.3 kg (177 lb) 80.3 kg (177 lb)   Height: 1.6 m (5' 3\")         Anesthesia Evaluation     Patient summary reviewed and Nursing notes reviewed    No history of anesthetic complications   Airway   Mallampati: II  TM distance: >3 FB  Neck ROM: limited  Dental - Dentition appears grossly intact     Pulmonary - negative ROS and normal exam   Cardiovascular - normal exam  Exercise tolerance: good  (+) hypertension well controlled    ROS comment: hyperlipidemia    Neuro/Psych    (+)  neuromuscular disease (Patient on a neck brace since 8/23 for nonhealing cervical injury from a fall),        GI/Hepatic/Renal    (+) GERD well controlled    Endo/Other - negative ROS   Abdominal  - normal exam                 Anesthesia Plan:   ASA:  2  Plan:   General  Monitors and Lines:   A-line  Airway:  ETT and Video laryngoscope  Post-op Pain Management: IV analgesics and Oral pain medication  Informed Consent Plan and Risks Discussed With:  Patient and spouse  Discussed plan with:  CRNA      I have informed Miranda Oliveros of the nature of the anesthetic plan, benefits, risks including possible dental damage if relevant, major complications, and any alternative forms of anesthetic management.   All of the patient's questions were answered to the best of my ability. The patient desires the anesthetic management as planned.  MEAGHAN MOFFETT MD  4/9/2024 9:18 AM  Present on Admission:  **None**
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
## 2024-04-09 NOTE — CONSULTS
[facility]  part of [facility]    Report of Consultation    Miranda Oliveros Patient Status:  Inpatient    1955 MRN [id number]   Location [facility] POST ANESTHESIA CARE UNIT Attending Bartolo King MD   Hosp Day # 0 PCP Jhonathan Neal MD     Date of Admission:  2024    Reason for Consultation:   Postop management    History of Present Illness:   Patient is a 68-year-old female with past medical history significant for hypertension, hyperlipidemia with recent history of C1 fracture who presents for scheduled C1-C2 posterior fixation and fusion.  Currently resting in bed.  Moving all extremities.  Alert, awake.  Admits to some neck discomfort.  Denies significant dyspnea.  Dynamically stable.  Saturation stable.    Past Medical History  Past Medical History:   Diagnosis Date    Allergic rhinitis due to pollen     Carcinoma in situ of cervix uteri 1986    Cataract     Esophageal reflux     High blood pressure     High cholesterol     Hyperlipidemia     Menopause 2006    Osteoarthritis     generalized    Pure hypercholesterolemia     Rosacea     Tubular adenoma of colon 2012    Visual impairment     glasses       Past Surgical History  Past Surgical History:   Procedure Laterality Date              COLONOSCOPY N/A 2021    Procedure: COLONOSCOPY with polypectomy;  Surgeon: Sivakumar Benson MD;  Location: [facility]    COLONOSCOPY,BIOPSY  2012    Procedure: COLONOSCOPY, POSSIBLE BIOPSY, POSSIBLE POLYPECTOMY 13313;  Surgeon: Sivakumar Benson MD;  Location: [facility]    COLONOSCOPY,DIAGNOSTIC N/A 2016    Procedure: COLONOSCOPY, POSSIBLE BIOPSY, POSSIBLE POLYPECTOMY 25863;  Surgeon: Sivakumar Benson MD;  Location: [facility]    CT HEART W/ CALCIUM SCORING  2009    calcium score of 0    CT HEART W/ CALCIUM SCORING N/A 2019    calcium score is zero    IMPACT TOOTH REM BONY  W/COMP Bilateral 1973    wisdom teeth    KNEE REPLACEMENT SURGERY Right 02/21/2017    right    LASER SURGERY OF CERVIX  1/1986    PERIPHERAL VASCULAR SCREENING HISTORICAL CONV Bilateral 8/1/2016    PAD screen is normal    TOTAL KNEE REPLACEMENT Right 02/22/2017       Family History  Family History   Problem Relation Age of Onset    Diabetes Father     Heart Disease Father     Heart Surgery Father 55        CABGx4    Ear Problems Father         hearing loss    Heart Disorder Father     Hypertension Father     Cancer Mother 74        colon    Cataracts Mother     Hypertension Mother     Eye Problems Maternal Grandmother         macular degeneration    High Cholesterol Maternal Grandmother     Musculo-skelatal Disorder Maternal Grandmother         osteoporosis    Heart Disease Maternal Grandmother         CHf    Pulmonary Disease Maternal Grandmother         COPD    Lipids Maternal Grandmother     Alcohol and Other Disorders Associated Maternal Grandfather     Dementia Paternal Grandmother         Alzheimers    Neurological Disorder Paternal Grandmother         Parkinson's    Diabetes Paternal Grandfather     Heart Disorder Paternal Grandfather         CHF    Cancer Paternal Grandfather     High Cholesterol Sister     Hypertension Sister     Diabetes Sister     Clotting Disorder Sister         DVT    Obesity Sister     Kidney Disease Sister         stones    Arthritis Sister         TKA    Breast Cancer Sister 56        56    Other (Other) Sister         PMR    Breast Cancer Maternal Great-Grandmother        Social History  Social History     Socioeconomic History    Marital status:      Spouse name: Humberto    Number of children: 3    Years of education: 14   Occupational History    Occupation: Bank Asst Manager     Employer: [id number]   Tobacco Use    Smoking status: Former     Packs/day: 1.00     Years: 6.00     Additional pack years: 0.00     Total pack years: 6.00     Types: Cigarettes     Quit  date: 1983     Years since quittin.2    Smokeless tobacco: Never   Vaping Use    Vaping Use: Never used   Substance and Sexual Activity    Alcohol use: Yes     Alcohol/week: 7.0 standard drinks of alcohol     Types: 7 Standard drinks or equivalent per week     Comment: 2 hard seltzers daily    Drug use: No    Sexual activity: Yes     Partners: Male   Other Topics Concern     Service No    Blood Transfusions No    Caffeine Concern No    Occupational Exposure No    Hobby Hazards No    Sleep Concern No    Stress Concern No    Weight Concern No    Special Diet No    Back Care No    Exercise No    Bike Helmet No    Seat Belt No    Self-Exams Yes           Current Medications:  Current Facility-Administered Medications   Medication Dose Route Frequency    lactated ringers infusion   Intravenous Continuous    scopolamine (Transderm-Scop) 1 MG/3DAYS patch 1 patch  1 patch Transdermal Once    lactated ringers infusion   Intravenous Continuous    lactated ringers IV bolus 500 mL  500 mL Intravenous Once PRN    atropine 0.1 MG/ML injection 0.5 mg  0.5 mg Intravenous PRN    naloxone (Narcan) 0.4 MG/ML injection 0.08 mg  0.08 mg Intravenous PRN    fentaNYL (Sublimaze) 50 mcg/mL injection 25 mcg  25 mcg Intravenous Q5 Min PRN    fentaNYL (Sublimaze) 50 mcg/mL injection 50 mcg  50 mcg Intravenous Q5 Min PRN    HYDROmorphone (Dilaudid) 1 MG/ML injection 0.2 mg  0.2 mg Intravenous Q5 Min PRN    HYDROmorphone (Dilaudid) 1 MG/ML injection 0.4 mg  0.4 mg Intravenous Q5 Min PRN    HYDROmorphone (Dilaudid) 1 MG/ML injection 0.6 mg  0.6 mg Intravenous Q5 Min PRN    morphINE PF 4 MG/ML injection 2 mg  2 mg Intravenous Q10 Min PRN    morphINE PF 4 MG/ML injection 4 mg  4 mg Intravenous Q10 Min PRN    morphINE PF 10 MG/ML injection 6 mg  6 mg Intravenous Q10 Min PRN    ondansetron (Zofran) 4 MG/2ML injection 4 mg  4 mg Intravenous Q6H PRN    metoclopramide (Reglan) 5 mg/mL injection 10 mg  10 mg Intravenous Q8H PRN     aspirin 300 MG rectal suppository 300 mg  300 mg Rectal Daily     Medications Prior to Admission   Medication Sig    losartan 100 MG Oral Tab     Vitamin C 500 MG Oral Tab Take 1 tablet (500 mg total) by mouth daily.    acetaminophen 325 MG Oral Tab Take 2 tablets (650 mg total) by mouth every 4 (four) hours as needed.    montelukast 10 MG Oral Tab Take 1 tablet (10 mg total) by mouth nightly.    atorvastatin 10 MG Oral Tab TAKE 1 TABLET(10 MG) BY MOUTH EVERY DAY    Meloxicam 15 MG Oral Tab TAKE 1 TABLET(15 MG) BY MOUTH DAILY    Cyanocobalamin (VITAMIN B 12) 100 MCG Oral Lozenge Take 1,000 mcg by mouth daily.      Vitamin D3 2000 units Oral Cap Take 2 capsules (4,000 Units total) by mouth daily.    cetirizine 10 MG Oral Tab Take 1 tablet (10 mg total) by mouth daily.    Omeprazole Magnesium 20 MG Oral Tab EC Take 1 tablet (20 mg total) by mouth every morning.       Allergies  Allergies   Allergen Reactions    Amoxicillin-Pot Clavulanate RASH     No peeling/ blisters  No organ damage       Review of Systems:   Constitutional: denies fevers, chills, weakness, fatigue, recent illness  HEENT: denies headache, sore throat, vision loss  Cardio: denies chest pain, chest pressure, palpitations  Respiratory: denies dyspnea, cough, wheezing, hemoptysis   GI: denies nausea, vomiting, abdominal pain  : denies dysuria, hematuria  Musculoskeletal: Neck pain  Integumentary: denies rash, itching  Neurological: denies syncope, weakness, dizziness,   Psychiatric: denies depression, anxiety  Hematologic: denies bruising        Physical Exam:   Blood pressure 150/68, pulse 85, temperature 98.4 °F (36.9 °C), temperature source Oral, resp. rate 16, height 5' 3\" (1.6 m), weight 177 lb (80.3 kg), SpO2 100%, not currently breastfeeding.    Constitutional: no acute distress  Eyes: PERRL  ENT: nares patent  Neck: neck supple, no JVD  Cardio: RRR, S1 S2  Respiratory: clear to auscultation bilaterally, no wheezing, rales, rhonchi,  crackles  GI: abdomen soft, non tender, active bowel souds, no organomegaly  Extremities: no clubbing, cyanosis, edema  Neurologic: no gross motor deficits  Skin: warm, dry    Results:   Laboratory Data  Lab Results   Component Value Date    WBC 8.6 08/18/2023    HGB 12.7 04/09/2024    HCT 35.6 04/09/2024    .0 08/18/2023    CREATSERUM 0.94 08/18/2023    BUN 13 08/18/2023     (L) 08/18/2023    K 3.6 08/18/2023    CL 98 08/18/2023    CO2 28.0 08/18/2023     (H) 08/18/2023    CA 9.5 08/18/2023    ALB 4.2 08/18/2023    ALKPHO 101 08/18/2023    TP 7.4 08/18/2023    AST 18 08/18/2023    ALT 39 08/18/2023    INR 0.94 08/18/2023    PTP 12.6 08/18/2023    TSH 1.500 11/09/2021    ESRML 80 (H) 04/28/2021    CRP 0.53 (H) 04/28/2021    MG 1.9 07/23/2023    B12 709 11/09/2021    ETOH <3 08/18/2023         Imaging  XR FLUOROSCOPY C-ARM TIME LESS THAN 1 HOUR (CPT=76000)    Result Date: 4/9/2024  CONCLUSION: See above.   Dictated by (CST): Edilberto Christensen MD on 4/09/2024 at 3:45 PM     Finalized by (CST): Edilberto Christensen MD on 4/09/2024 at 3:47 PM           Assessment   1.  POD #0 status post C1/C2 posterior fixation and fusion  2.  C1 fracture  3.  Hypertension  4.  Hyperlipidemia    Plan   -Patient presents today for scheduled C1-2 C2 posterior fixation and fusion.  Recent history of C1 fracture  -Pain control  -Close neuromonitoring  -Antihypertensives as need be  -Further recommendations per neurosurgery  -Reviewed vitals, labs and imaging    Cony Pugh DO  Pulmonary Critical Care Medicine  [facility]  4/9/2024  4:12 PM

## 2024-04-09 NOTE — ANESTHESIA PROCEDURE NOTES
Airway  Date/Time: 4/9/2024 10:20 AM  Urgency: Elective      General Information and Staff    Patient location during procedure: OR  Anesthesiologist: Jacob Rivera MD  Resident/CRNA: Ava Ho CRNA  Performed: CRNA   Performed by: Ava Ho CRNA  Authorized by: Jacob Rivera MD      Indications and Patient Condition  Indications for airway management: anesthesia  Sedation level: deep  Preoxygenated: yes  Patient position: sniffing  MILS maintained throughout  Mask difficulty assessment: 0 - not attempted    Final Airway Details  Final airway type: endotracheal airway      Successful airway: ETT  Cuffed: yes   Successful intubation technique: Video laryngoscopy  Endotracheal tube insertion site: oral  Blade: GlideScope  Blade size: #3  ETT size (mm): 7.5    Cormack-Lehane Classification: grade I - full view of glottis  Placement verified by: capnometry   Measured from: lips  ETT to lips (cm): 21  Number of attempts at approach: 1    Additional Comments  Patient positioned self on bed prior to induction; cervical collar and stability maintained throughout; easy, atraumatic intubation with GS; dentition, lips and mucosa unchanged.

## 2024-04-09 NOTE — BRIEF OP NOTE
Pre-Operative Diagnosis: Closed displaced fracture of first cervical vertebra, unspecified fracture morphology, initial encounter ([facility]) [S12.000A]     Post-Operative Diagnosis: Closed displaced fracture of first cervical vertebra, unspecified fracture morphology, initial encounter ([facility]) [S12.000A]      Procedure Performed:   Cervical 1 to 2 posterior fixation and fusion    Surgeon(s) and Role:     * Bartolo King MD - Primary    Assistant(s):  PA: John Lundberg PA-C     Surgical Findings: As expected preoperatively     Specimen: None     Estimated Blood Loss: Blood Output: 250 mL (4/9/2024  2:09 PM)    Bartolo King MD  Neurological Surgery    [address], [place] [id number] [phone]  Pager [phone]  4/9/2024 3:08 PM      This note was created using a voice-recognition transcribing system. Incorrect words or phrases may have been missed during proofreading. Please interpret accordingly.

## 2024-04-09 NOTE — H&P
[facility] Hospitalist H&P       CC: No chief complaint on file.       PCP: Jhonathan Neal MD    Date of Admission: 2024  8:46 AM    ASSESSMENT / PLAN:     Ms. Oliveros is a 69 yo F with PMH Of HTN, HL GERD who presented for cervical surgery.     S/p C1-C2 posterior fixation and fusion  - PRN pain meds, Transition to PO when able  - monitor for acute blood loss anemia  - Bowel reg, antiemetics  - DVT Prophy- SCD  - PT/OT/SW   - as per surgery     HTN  - BP mildly elevated  - continue home losartan    HL  - statin    GERD  - PPI    FN:  - IVF:  - Diet: ADAT    DVT Prophy: SCD  Lines: PIV    Dispo: pending clinical course    Outpatient records or previous hospital records reviewed.     Further recommendations pending patient's clinical course.  [facility] hospitalist to continue to follow patient while in house    Patient and/or patient's family given opportunity to ask questions and note understanding and agreeing with therapeutic plan as outlined    Malou Bolivar MD  [facility] Hospitalist  Answering Service number: [phone]    HPI       History of Present Illness:       Ms. Oliveros is a 69 yo F with PMH Of HTN, HL GERD who presented for cervical surgery. Patient seen post op, having some neck pain. No CP, SOB, N/V.       PMH  Past Medical History:   Diagnosis Date    Allergic rhinitis due to pollen     Carcinoma in situ of cervix uteri 1986    Cataract     Esophageal reflux     High blood pressure     High cholesterol     Hyperlipidemia     Menopause 2006    Osteoarthritis     generalized    Pure hypercholesterolemia     Rosacea     Tubular adenoma of colon 2012    Visual impairment     glasses        PSH  Past Surgical History:   Procedure Laterality Date              COLONOSCOPY N/A 2021    Procedure: COLONOSCOPY with polypectomy;  Surgeon: Sivakumar Benson MD;  Location: [facility] SURGICAL [facility]    COLONOSCOPY,BIOPSY  2012    Procedure: COLONOSCOPY, POSSIBLE BIOPSY, POSSIBLE  POLYPECTOMY 01716;  Surgeon: Sivakumar Benson MD;  Location: [facility]    COLONOSCOPY,DIAGNOSTIC N/A 5/14/2016    Procedure: COLONOSCOPY, POSSIBLE BIOPSY, POSSIBLE POLYPECTOMY 63191;  Surgeon: Sivakumar Benson MD;  Location: [facility]    CT HEART W/ CALCIUM SCORING  2/16/2009    calcium score of 0    CT HEART W/ CALCIUM SCORING N/A 08/08/2019    calcium score is zero    IMPACT TOOTH REM BONY W/COMP Bilateral 1973    wisdom teeth    KNEE REPLACEMENT SURGERY Right 02/21/2017    right    LASER SURGERY OF CERVIX  1/1986    PERIPHERAL VASCULAR SCREENING HISTORICAL CONV Bilateral 8/1/2016    PAD screen is normal    TOTAL KNEE REPLACEMENT Right 02/22/2017        ALL:  Allergies   Allergen Reactions    Amoxicillin-Pot Clavulanate RASH     No peeling/ blisters  No organ damage        Home Medications:  Outpatient Medications Marked as Taking for the 4/9/24 encounter (Hospital Encounter)   Medication Sig Dispense Refill    losartan 100 MG Oral Tab       Vitamin C 500 MG Oral Tab Take 1 tablet (500 mg total) by mouth daily.      acetaminophen 325 MG Oral Tab Take 2 tablets (650 mg total) by mouth every 4 (four) hours as needed.  0    [DISCONTINUED] losartan 50 MG Oral Tab Take 1 tablet (50 mg total) by mouth daily. (Patient taking differently: Take 2 tablets (100 mg total) by mouth every morning.) 30 tablet 0    montelukast 10 MG Oral Tab Take 1 tablet (10 mg total) by mouth nightly. 90 tablet 3    atorvastatin 10 MG Oral Tab TAKE 1 TABLET(10 MG) BY MOUTH EVERY DAY 90 tablet 3    Meloxicam 15 MG Oral Tab TAKE 1 TABLET(15 MG) BY MOUTH DAILY 90 tablet 3    Cyanocobalamin (VITAMIN B 12) 100 MCG Oral Lozenge Take 1,000 mcg by mouth daily.   30 lozenge 0    Vitamin D3 2000 units Oral Cap Take 2 capsules (4,000 Units total) by mouth daily. 180 capsule 3    cetirizine 10 MG Oral Tab Take 1 tablet (10 mg total) by mouth daily.      Omeprazole Magnesium 20 MG Oral Tab EC Take 1 tablet (20 mg total) by  mouth every morning. 28 tablet 0         Soc Hx  Social History     Tobacco Use    Smoking status: Former     Packs/day: 1.00     Years: 6.00     Additional pack years: 0.00     Total pack years: 6.00     Types: Cigarettes     Quit date: 1983     Years since quittin.2    Smokeless tobacco: Never   Substance Use Topics    Alcohol use: Yes     Alcohol/week: 7.0 standard drinks of alcohol     Types: 7 Standard drinks or equivalent per week     Comment: 2 hard seltzers daily        Fam Hx  Family History   Problem Relation Age of Onset    Diabetes Father     Heart Disease Father     Heart Surgery Father 55        CABGx4    Ear Problems Father         hearing loss    Heart Disorder Father     Hypertension Father     Cancer Mother 74        colon    Cataracts Mother     Hypertension Mother     Eye Problems Maternal Grandmother         macular degeneration    High Cholesterol Maternal Grandmother     Musculo-skelatal Disorder Maternal Grandmother         osteoporosis    Heart Disease Maternal Grandmother         CHf    Pulmonary Disease Maternal Grandmother         COPD    Lipids Maternal Grandmother     Alcohol and Other Disorders Associated Maternal Grandfather     Dementia Paternal Grandmother         Alzheimers    Neurological Disorder Paternal Grandmother         Parkinson's    Diabetes Paternal Grandfather     Heart Disorder Paternal Grandfather         CHF    Cancer Paternal Grandfather     High Cholesterol Sister     Hypertension Sister     Diabetes Sister     Clotting Disorder Sister         DVT    Obesity Sister     Kidney Disease Sister         stones    Arthritis Sister         TKA    Breast Cancer Sister 56        56    Other (Other) Sister         PMR    Breast Cancer Maternal Great-Grandmother        Review of Systems  Comprehensive ROS reviewed and negative except for what's stated above.     OBJECTIVE:  /68 (BP Location: Right arm)   Pulse 85   Temp 98.4 °F (36.9 °C) (Oral)   Resp 16    Ht 5' 3\" (1.6 m)   Wt 177 lb (80.3 kg)   LMP  (LMP Unknown)   SpO2 100%   BMI 31.35 kg/m²     GEN: female in NAD  HEENT: EOMI  Pulm: CTAB, no crackles or wheezes  CV: RRR, no murmurs  ABD: Soft, non-tender, non-distended, +BS  SKIN: warm, dry  EXT: no edema    Diagnostic Data:    CBC/Chem    No results for input(s): \"RBC\", \"HGB\", \"HCT\", \"MCV\", \"MCH\", \"MCHC\", \"RDW\", \"NEPRELIM\", \"WBC\", \"PLT\" in the last 168 hours.    COMPREHENSIVE METABOLIC PANEL  Specimen: Blood  Component  Ref Range & Units 13 d ago Comments   Patient Fasting? Yes    Glucose  74 - 109 mg/dL 94    Blood Urea Nitrogen  6.0 - 20.0 mg/dL 12.0    Creatinine  0.5 - 0.9 mg/dL 0.65    BUN/CREAT Ratio  10.0 - 20.0 18.0    Sodium  136 - 145 mmol/L 139    Potassium  3.5 - 5.2 mmol/L 4.8    Chloride  98 - 107 mmol/L 100    Carbon Dioxide  22.0 - 29.0 mmol/L 28.9    Calcium  8.6 - 10.3 mg/dL 9.7    Total Protein  6.4 - 8.3 g/dL 7.0    Albumin  3.5 - 5.2 g/dL 4.7    Bilirubin, Total  0.00 - 1.20 mg/dL 0.49    Alkaline Phosphatase  55 - 142 U/L 84    AST  0 - 32 U/L 21    ALT  0 - 33 U/L 24    GFR CKD-EPI  >=60.00 mL/min/1.73 m² 92.81      COMPLETE BLOOD COUNT (CBC) WITH DIFFERENTIAL  Specimen: Blood  Component  Ref Range & Units 13 d ago   WBC  4.00 - 13.00 10^3/uL 4.11   RBC  3.80 - 5.10 10^6/uL 4.86   Hemoglobin  12.0 - 16.0 g/dL 14.0   Hematocrit  34.0 - 50.0 % 43.4   MCV  81.0 - 100.0 fL 89.3   MCH  27.0 - 33.2 pg 28.8   MCHC  31.0 - 37.0 g/dL 32.3   Platelet Count  150 - 450 10^3/uL 255   RDW  11.5 - 16.0 % 12.5   MPV  7.0 - 11.5 fL 9.6   Neutrophils Absolute  1.30 - 6.70 10ˆ3/µL 2.47   Lymphocytes Absolute  0.90 - 4.00 10ˆ3/µL 1.15   Monocytes Absolute  0.10 - 1.00 10ˆ3/µL 0.31   Eosinophils Absolute  0.00 - 0.30 10ˆ3/µL 0.15   Basophils Absolute  0.00 - 0.10 10ˆ3/µL 0.03   nRBC Absolute  0.000 - 0.012 10ˆ3/µL 0.000   Neutrophils %  % 60.2   Lymphocytes %  % 28.0   Monocytes %  % 7.5   Eosinophils %  % 3.6   Basophils %  % 0.7   nRBC/100 WBC  % 0.00        No results for input(s): \"GLU\", \"BUN\", \"CREATSERUM\", \"GFRAA\", \"GFRNAA\", \"EGFRCR\", \"CA\", \"NA\", \"K\", \"CL\", \"CO2\" in the last 168 hours.       No results for input(s): \"TROP\" in the last 168 hours.    Additional Diagnostics:     Radiology: XR FLUOROSCOPY C-ARM TIME LESS THAN 1 HOUR (CPT=76000)    Result Date: 4/9/2024  CONCLUSION: See above.   Dictated by (CST): Edilberto Christensen MD on 4/09/2024 at 3:45 PM     Finalized by (CST): Edilberto Christensen MD on 4/09/2024 at 3:47 PM

## 2024-04-09 NOTE — ANESTHESIA POSTPROCEDURE EVALUATION
Patient: Miranda Oliveros    Procedure Summary       Date: 04/09/24 Room / Location: [facility] MAIN OR 02 / [facility] MAIN OR    Anesthesia Start: 1012 Anesthesia Stop: 1539    Procedures:       Cervcal 1 to cervical 2 posterior fixation and fusion (Bilateral: Spine Cervical)      POSTERIOR CERVICAL LAMINECTOMY FORAMINOTOMY 1 LEV (Spine Cervical) Diagnosis:       Closed displaced fracture of first cervical vertebra, unspecified fracture morphology, initial encounter ([facility])      (Closed displaced fracture of first cervical vertebra, unspecified fracture morphology, initial encounter ([facility]) [S12.000A])    Surgeons: Bartolo King MD Anesthesiologist: Len Nayak MD    Anesthesia Type: general ASA Status: 2            Anesthesia Type: general    Vitals Value Taken Time   /81 04/09/24 1536   Temp 97.6 04/09/24 1539   Pulse 96 04/09/24 1539   Resp 12 04/09/24 1539   SpO2 98 % 04/09/24 1539   Vitals shown include unfiled device data.    [facility] AN Post Evaluation:   Patient Evaluated in PACU  Patient Participation: complete - patient participated  Level of Consciousness: sleepy but conscious  Pain Score: 0  Pain Management: adequate  Airway Patency:patent  Dental exam unchanged from preop  Yes    Nausea/Vomiting: none  Cardiovascular Status: acceptable  Respiratory Status: acceptable  Postoperative Hydration acceptable      Len Nayak MD  4/9/2024 3:39 PM

## 2024-04-09 NOTE — ANESTHESIA PROCEDURE NOTES
Arterial Line    Date/Time: 4/9/2024 10:27 AM    Performed by: Jacob Rivera MD  Authorized by: Jacob Rivera MD    General Information and Staff    Procedure Start:  4/9/2024 10:27 AM  Procedure End:  4/9/2024 10:29 AM  Anesthesiologist:  Jacob Rivera MD  Performed By:  Anesthesiologist  Patient Location:  OR  Indication: continuous blood pressure monitoring and blood sampling needed    Site Identification: surface landmarks    Preanesthetic Checklist: 2 patient identifiers, IV checked, risks and benefits discussed, monitors and equipment checked, pre-op evaluation, timeout performed, anesthesia consent and sterile technique used    Procedure Details    Catheter Size:  20 G  Catheter Length:  1 and 3/4 inch  Catheter Type:  Arrow  Seldinger Technique?: Yes    Laterality:  Left  Site:  Radial artery  Site Prep: chlorhexidine    Line Secured:  Wrist Brace, tape and Tegaderm    Assessment    Events: patient tolerated procedure well with no complications      Medications  4/9/2024 10:27 AM      Additional Comments

## 2024-04-10 ENCOUNTER — TELEPHONE (OUTPATIENT)
Dept: SURGERY | Facility: CLINIC | Age: 69
End: 2024-04-10

## 2024-04-10 DIAGNOSIS — Z98.890 HX OF CERVICAL SPINE SURGERY: Primary | ICD-10-CM

## 2024-04-10 DIAGNOSIS — M53.2X2 SPINAL INSTABILITY OF CERVICAL REGION: ICD-10-CM

## 2024-04-10 DIAGNOSIS — S12.01XD CLOSED STABLE BURST FRACTURE OF FIRST CERVICAL VERTEBRA WITH ROUTINE HEALING, SUBSEQUENT ENCOUNTER: ICD-10-CM

## 2024-04-10 DIAGNOSIS — S12.000A CLOSED DISPLACED FRACTURE OF FIRST CERVICAL VERTEBRA, UNSPECIFIED FRACTURE MORPHOLOGY, INITIAL ENCOUNTER (HCC): ICD-10-CM

## 2024-04-10 LAB — POTASSIUM SERPL-SCNC: 4 MMOL/L (ref 3.5–5.1)

## 2024-04-10 PROCEDURE — 99232 SBSQ HOSP IP/OBS MODERATE 35: CPT | Performed by: INTERNAL MEDICINE

## 2024-04-10 RX ORDER — NALOXONE HYDROCHLORIDE 4 MG/.1ML
4 SPRAY NASAL AS NEEDED
Qty: 1 KIT | Refills: 0 | Status: SHIPPED | OUTPATIENT
Start: 2024-04-10

## 2024-04-10 RX ORDER — OXYCODONE HYDROCHLORIDE 5 MG/1
5 TABLET ORAL EVERY 4 HOURS PRN
Qty: 30 TABLET | Refills: 0 | Status: SHIPPED | OUTPATIENT
Start: 2024-04-10

## 2024-04-10 RX ORDER — ASPIRIN 81 MG/1
81 TABLET, CHEWABLE ORAL DAILY
Qty: 90 TABLET | Refills: 1 | Status: SHIPPED | OUTPATIENT
Start: 2024-04-11

## 2024-04-10 RX ORDER — METHOCARBAMOL 500 MG/1
500 TABLET, FILM COATED ORAL 3 TIMES DAILY PRN
Qty: 60 TABLET | Refills: 0 | Status: SHIPPED | OUTPATIENT
Start: 2024-04-10 | End: 2024-04-24

## 2024-04-10 RX ORDER — ACETAMINOPHEN 500 MG
500 TABLET ORAL EVERY 4 HOURS PRN
Qty: 120 TABLET | Refills: 0 | Status: SHIPPED | OUTPATIENT
Start: 2024-04-10

## 2024-04-10 NOTE — PLAN OF CARE
Problem: CARDIOVASCULAR - ADULT  Goal: Maintains optimal cardiac output and hemodynamic stability  Description: INTERVENTIONS:  - Monitor vital signs, rhythm, and trends  - Monitor for bleeding, hypotension and signs of decreased cardiac output  - Evaluate effectiveness of vasoactive medications to optimize hemodynamic stability  - Monitor arterial and/or venous puncture sites for bleeding and/or hematoma  - Assess quality of pulses, skin color and temperature  - Assess for signs of decreased coronary artery perfusion - ex. Angina  - Evaluate fluid balance, assess for edema, trend weights  Outcome: Progressing  Goal: Absence of cardiac arrhythmias or at baseline  Description: INTERVENTIONS:  - Continuous cardiac monitoring, monitor vital signs, obtain 12 lead EKG if indicated  - Evaluate effectiveness of antiarrhythmic and heart rate control medications as ordered  - Initiate emergency measures for life threatening arrhythmias  - Monitor electrolytes and administer replacement therapy as ordered  Outcome: Progressing     Problem: RESPIRATORY - ADULT  Goal: Achieves optimal ventilation and oxygenation  Description: INTERVENTIONS:  - Assess for changes in respiratory status  - Assess for changes in mentation and behavior  - Position to facilitate oxygenation and minimize respiratory effort  - Oxygen supplementation based on oxygen saturation or ABGs  - Provide Smoking Cessation handout, if applicable  - Encourage broncho-pulmonary hygiene including cough, deep breathe, Incentive Spirometry  - Assess the need for suctioning and perform as needed  - Assess and instruct to report SOB or any respiratory difficulty  - Respiratory Therapy support as indicated  - Manage/alleviate anxiety  - Monitor for signs/symptoms of CO2 retention  Outcome: Progressing     Problem: GASTROINTESTINAL - ADULT  Goal: Minimal or absence of nausea and vomiting  Description: INTERVENTIONS:  - Maintain adequate hydration with IV or PO as  ordered and tolerated  - Nasogastric tube to low intermittent suction as ordered  - Evaluate effectiveness of ordered antiemetic medications  - Provide nonpharmacologic comfort measures as appropriate  - Advance diet as tolerated, if ordered  - Obtain nutritional consult as needed  - Evaluate fluid balance  Outcome: Progressing     Problem: METABOLIC/FLUID AND ELECTROLYTES - ADULT  Goal: Electrolytes maintained within normal limits  Description: INTERVENTIONS:  - Monitor labs and rhythm and assess patient for signs and symptoms of electrolyte imbalances  - Administer electrolyte replacement as ordered  - Monitor response to electrolyte replacements, including rhythm and repeat lab results as appropriate  - Fluid restriction as ordered  - Instruct patient on fluid and nutrition restrictions as appropriate  Outcome: Progressing     Problem: SKIN/TISSUE INTEGRITY - ADULT  Goal: Skin integrity remains intact  Description: INTERVENTIONS  - Assess and document risk factors for pressure ulcer development  - Assess and document skin integrity  - Monitor for areas of redness and/or skin breakdown  - Initiate interventions, skin care algorithm/standards of care as needed  Outcome: Progressing  Goal: Incision(s), wounds(s) or drain site(s) healing without S/S of infection  Description: INTERVENTIONS:  - Assess and document risk factors for pressure ulcer development  - Assess and document skin integrity  - Assess and document dressing/incision, wound bed, drain sites and surrounding tissue  - Implement wound care per orders  - Initiate isolation precautions as appropriate  - Initiate Pressure Ulcer prevention bundle as indicated  Outcome: Progressing     Problem: HEMATOLOGIC - ADULT  Goal: Maintains hematologic stability  Description: INTERVENTIONS  - Assess for signs and symptoms of bleeding or hemorrhage  - Monitor labs and vital signs for trends  - Administer supportive blood products/factors, fluids and medications as  ordered and appropriate  - Administer supportive blood products/factors as ordered and appropriate  Outcome: Progressing  Goal: Free from bleeding injury  Description: (Example usage: patient with low platelets)  INTERVENTIONS:  - Avoid intramuscular injections, enemas and rectal medication administration  - Ensure safe mobilization of patient  - Hold pressure on venipuncture sites to achieve adequate hemostasis  - Assess for signs and symptoms of internal bleeding  - Monitor lab trends  - Patient is to report abnormal signs of bleeding to staff  - Avoid use of toothpicks and dental floss  - Use electric shaver for shaving  - Use soft bristle tooth brush  - Limit straining and forceful nose blowing  Outcome: Progressing     Problem: NEUROLOGICAL - ADULT  Goal: Achieves stable or improved neurological status  Description: INTERVENTIONS  - Assess for and report changes in neurological status  - Initiate measures to prevent increased intracranial pressure  - Maintain blood pressure and fluid volume within ordered parameters to optimize cerebral perfusion and minimize risk of hemorrhage  - Monitor temperature, glucose, and sodium. Initiate appropriate interventions as ordered  Outcome: Progressing     Problem: Impaired Functional Mobility  Goal: Achieve highest/safest level of mobility/gait  Description: Interventions:  - Assess patient's functional ability and stability  - Promote increasing activity/tolerance for mobility and gait  - Educate and engage patient/family in tolerated activity level and precautions  Outcome: Progressing     Problem: Impaired Activities of Daily Living  Goal: Achieve highest/safest level of independence in self care  Description: Interventions:  - Assess ability and encourage patient to participate in ADLs to maximize function  - Promote sitting position while performing ADLs such as feeding, grooming, and bathing  - Educate and encourage patient/family in tolerated functional activity  level and precautions during self-care  Outcome: Progressing     Problem: PAIN - ADULT  Goal: Verbalizes/displays adequate comfort level or patient's stated pain goal  Description: INTERVENTIONS:  - Encourage pt to monitor pain and request assistance  - Assess pain using appropriate pain scale  - Administer analgesics based on type and severity of pain and evaluate response  - Implement non-pharmacological measures as appropriate and evaluate response  - Consider cultural and social influences on pain and pain management  - Manage/alleviate anxiety  - Utilize distraction and/or relaxation techniques  - Monitor for opioid side effects  - Notify MD/LIP if interventions unsuccessful or patient reports new pain  - Anticipate increased pain with activity and pre-medicate as appropriate  Outcome: Progressing

## 2024-04-10 NOTE — PROGRESS NOTES
DM Hospitalist Progress Note     CC: Hospital Follow up    PCP: Jhonathan Neal MD       Assessment/Plan:     Active Problems:    S/P cervical spinal fusion    Ms. Oliveros is a 67 yo F with PMH Of HTN, HL GERD who presented for cervical surgery.      S/p C1-C2 posterior fixation and fusion  - PRN pain meds, Transition to PO when able  - monitor for acute blood loss anemia  - Bowel reg, antiemetics  - DVT Prophy- SCD  - PT/OT/SW   - as per surgery     Sinus tachycardia  - HR 100s post op, pre-op 90s  - possibly pain related, denies chest pain, shortness of breath or palpitations  - monitor     HTN  - BP mildly elevated  - continue home losartan     HL  - statin     GERD  - PPI     FN:  - IVF:  - Diet: ADAT     DVT Prophy: SCD  Lines: PIV     Dispo: pending clinical course, likely home tomorrow, can transfer to ortho floor     Outpatient records or previous hospital records reviewed.      Further recommendations pending patient's clinical course.  [facility] hospitalist to continue to follow patient while in house     Patient and/or patient's family given opportunity to ask questions and note understanding and agreeing with therapeutic plan as outlined     Malou Bolivar MD  [facility] Hospitalist  Answering Service number: [phone]        Subjective:     Doing well, mild pain. No N/V/CP/SOB. Denies feeling palpitations.     OBJECTIVE:    Blood pressure 129/72, pulse 106, temperature 97.9 °F (36.6 °C), temperature source Temporal, resp. rate 22, height 5' 3\" (1.6 m), weight 176 lb 5.9 oz (80 kg), SpO2 98%, not currently breastfeeding.    Temp:  [97.1 °F (36.2 °C)-97.9 °F (36.6 °C)] 97.9 °F (36.6 °C)  Pulse:  [] 106  Resp:  [6-22] 22  BP: (113-157)/() 129/72  SpO2:  [93 %-100 %] 98 %  AO: (113-174)/(60-94) 113/62      Intake/Output:    Intake/Output Summary (Last 24 hours) at 4/10/2024 1011  Last data filed at 4/10/2024 0607  Gross per 24 hour   Intake 2591 ml   Output 1800 ml   Net 791 ml       Last 3 Weights    04/10/24 0600 176 lb 5.9 oz (80 kg)   04/09/24 0856 177 lb (80.3 kg)   04/03/24 1025 177 lb (80.3 kg)   04/04/24 1131 177 lb (80.3 kg)   02/15/24 1345 180 lb (81.6 kg)       Exam  GEN: female in NAD  HEENT: EOMI  Pulm: CTAB, no crackles or wheezes  CV: tachycardic, regular rhythm, no murmurs  ABD: Soft, non-tender, non-distended, +BS  SKIN: warm, dry  EXT: no edema       Data Review:       Labs:     Recent Labs   Lab 04/09/24  1549   HGB 12.7   HCT 35.6         Recent Labs   Lab 04/09/24  1851 04/10/24  0613   *  --    BUN 9  --    CREATSERUM 0.64  --    EGFRCR 97  --    CA 8.5*  --      --    K 3.2* 4.0     --    CO2 25.0  --        No results for input(s): \"ALT\", \"AST\", \"ALB\", \"AMYLASE\", \"LIPASE\", \"LDH\" in the last 168 hours.    Invalid input(s): \"ALPHOS\", \"TBIL\", \"DBIL\", \"TPROT\"      Imaging:  CTA BRAIN + CTA CAROTIDS (CPT=70496/89530)    Result Date: 4/9/2024  CONCLUSION:  1. Limited assessment of the junction of the V2/V3 segments both vertebral arteries at the C2 level due to streak artifact from the adjacent surgical hardware.  However, there is a suspected focally severe stenosis of the left vertebral artery at C2 where the left pedicular screw appears to course through the superior aspect of the transverse foramen adjacent to the vessel.  The remainder of the left vertebral artery is patent. 2. No flow limiting stenosis or occlusion of the internal carotid arteries or major intracranial arteries. 3. Postoperative changes from a recent posterior instrumented fusion at C1-C2 due to a known displaced comminuted burst fracture of C1. 4. No acute intracranial process by noncontrast CT technique. 5. Stable mild generalized atrophy and mild chronic microangiopathic ischemic changes.   Results of this examination were discussed with the patient's physician, Dr. Bartolo King, by Dr. Salomon at 22:40 on 04/09/2024.  Dictated by (CST): Coy Salomon MD on 4/09/2024 at 10:10 PM      Finalized by (CST): Coy Salomon MD on 4/09/2024 at 10:41 PM          XR FLUOROSCOPY C-ARM TIME LESS THAN 1 HOUR (CPT=76000)    Result Date: 4/9/2024  CONCLUSION: See above.   Dictated by (CST): Edilberto Christensen MD on 4/09/2024 at 3:45 PM     Finalized by (CST): Edilberto Christensen MD on 4/09/2024 at 3:47 PM             Meds:     INPATIENT MEDICATIONS    Scheduled Medications:      sennosides, 17.2 mg, Nightly  docusate sodium, 100 mg, BID  enoxaparin, 40 mg, Daily  methocarbamol, 500 mg, q6h  acetaminophen, 1,000 mg, Q6H  atorvastatin, 10 mg, Daily  cetirizine, 10 mg, Daily  montelukast, 10 mg, Nightly  pantoprazole, 20 mg, QAM  losartan, 100 mg, Daily  aspirin, 81 mg, Daily            Drips:  lactated ringers, Last Rate: 20 mL/hr at 04/09/24 0912  sodium chloride, Last Rate: 100 mL/hr at 04/09/24 1730        PRN Medications  sodium chloride, 500 mL, Once PRN  polyethylene glycol (PEG 3350), 17 g, Daily PRN  magnesium hydroxide, 30 mL, Daily PRN  bisacodyl, 10 mg, Daily PRN  fleet enema, 1 enema, Once PRN  ondansetron, 4 mg, Q6H PRN  metoclopramide, 10 mg, Q8H PRN  diphenhydrAMINE, 25 mg, Q4H PRN   Or  diphenhydrAMINE, 25 mg, Q4H PRN  benzocaine-menthol, 1 lozenge, Q15 Min PRN  oxyCODONE, 2.5 mg, Q4H PRN   Or  oxyCODONE, 5 mg, Q4H PRN  HYDROmorphone, 0.2 mg, Q2H PRN   Or  HYDROmorphone, 0.4 mg, Q2H PRN  hydrALAzine, 10 mg, Q1H PRN  labetalol, 10 mg, Q10 Min PRN

## 2024-04-10 NOTE — CM/SW NOTE
04/10/24 1600   CM/SW Referral Data   Referral Source Physician   Reason for Referral Discharge planning   Medical Hx   Does patient have an established PCP? Yes   Patient Info   Patient's Home Environment House   Number of Levels in Home 2   Patient lives with Spouse/Significant other   Patient Status Prior to Admission   Independent with ADLs and Mobility Yes   Discharge Needs   Anticipated D/C needs No anticipated discharge needs     MD order received regarding discharge planning.  The pt. Is home with his wife and has been independent prior to admission.    The pt. Worked with therapy and Anticipated therapy need: Home.      Plan is for discharge home when medically cleared.      WELLINGTON Jimenez ext [phone]

## 2024-04-10 NOTE — PLAN OF CARE
Problem: Patient Centered Care  Goal: Patient preferences are identified and integrated in the patient's plan of care  Description: Interventions:  - What would you like us to know as we care for you?   - Provide timely, complete, and accurate information to patient/family  - Incorporate patient and family knowledge, values, beliefs, and cultural backgrounds into the planning and delivery of care  - Encourage patient/family to participate in care and decision-making at the level they choose  - Honor patient and family perspectives and choices  Outcome: Progressing     Problem: CARDIOVASCULAR - ADULT  Goal: Maintains optimal cardiac output and hemodynamic stability  Description: INTERVENTIONS:  - Monitor vital signs, rhythm, and trends  - Monitor for bleeding, hypotension and signs of decreased cardiac output  - Evaluate effectiveness of vasoactive medications to optimize hemodynamic stability  - Monitor arterial and/or venous puncture sites for bleeding and/or hematoma  - Assess quality of pulses, skin color and temperature  - Assess for signs of decreased coronary artery perfusion - ex. Angina  - Evaluate fluid balance, assess for edema, trend weights  Outcome: Progressing  Goal: Absence of cardiac arrhythmias or at baseline  Description: INTERVENTIONS:  - Continuous cardiac monitoring, monitor vital signs, obtain 12 lead EKG if indicated  - Evaluate effectiveness of antiarrhythmic and heart rate control medications as ordered  - Initiate emergency measures for life threatening arrhythmias  - Monitor electrolytes and administer replacement therapy as ordered  Outcome: Progressing     Problem: RESPIRATORY - ADULT  Goal: Achieves optimal ventilation and oxygenation  Description: INTERVENTIONS:  - Assess for changes in respiratory status  - Assess for changes in mentation and behavior  - Position to facilitate oxygenation and minimize respiratory effort  - Oxygen supplementation based on oxygen saturation or ABGs  -  Provide Smoking Cessation handout, if applicable  - Encourage broncho-pulmonary hygiene including cough, deep breathe, Incentive Spirometry  - Assess the need for suctioning and perform as needed  - Assess and instruct to report SOB or any respiratory difficulty  - Respiratory Therapy support as indicated  - Manage/alleviate anxiety  - Monitor for signs/symptoms of CO2 retention  Outcome: Progressing     Problem: GASTROINTESTINAL - ADULT  Goal: Minimal or absence of nausea and vomiting  Description: INTERVENTIONS:  - Maintain adequate hydration with IV or PO as ordered and tolerated  - Nasogastric tube to low intermittent suction as ordered  - Evaluate effectiveness of ordered antiemetic medications  - Provide nonpharmacologic comfort measures as appropriate  - Advance diet as tolerated, if ordered  - Obtain nutritional consult as needed  - Evaluate fluid balance  Outcome: Progressing     Problem: METABOLIC/FLUID AND ELECTROLYTES - ADULT  Goal: Electrolytes maintained within normal limits  Description: INTERVENTIONS:  - Monitor labs and rhythm and assess patient for signs and symptoms of electrolyte imbalances  - Administer electrolyte replacement as ordered  - Monitor response to electrolyte replacements, including rhythm and repeat lab results as appropriate  - Fluid restriction as ordered  - Instruct patient on fluid and nutrition restrictions as appropriate  Outcome: Progressing     Problem: SKIN/TISSUE INTEGRITY - ADULT  Goal: Skin integrity remains intact  Description: INTERVENTIONS  - Assess and document risk factors for pressure ulcer development  - Assess and document skin integrity  - Monitor for areas of redness and/or skin breakdown  - Initiate interventions, skin care algorithm/standards of care as needed  Outcome: Progressing  Goal: Incision(s), wounds(s) or drain site(s) healing without S/S of infection  Description: INTERVENTIONS:  - Assess and document risk factors for pressure ulcer development  -  Assess and document skin integrity  - Assess and document dressing/incision, wound bed, drain sites and surrounding tissue  - Implement wound care per orders  - Initiate isolation precautions as appropriate  - Initiate Pressure Ulcer prevention bundle as indicated  Outcome: Progressing     Problem: HEMATOLOGIC - ADULT  Goal: Maintains hematologic stability  Description: INTERVENTIONS  - Assess for signs and symptoms of bleeding or hemorrhage  - Monitor labs and vital signs for trends  - Administer supportive blood products/factors, fluids and medications as ordered and appropriate  - Administer supportive blood products/factors as ordered and appropriate  Outcome: Progressing  Goal: Free from bleeding injury  Description: (Example usage: patient with low platelets)  INTERVENTIONS:  - Avoid intramuscular injections, enemas and rectal medication administration  - Ensure safe mobilization of patient  - Hold pressure on venipuncture sites to achieve adequate hemostasis  - Assess for signs and symptoms of internal bleeding  - Monitor lab trends  - Patient is to report abnormal signs of bleeding to staff  - Avoid use of toothpicks and dental floss  - Use electric shaver for shaving  - Use soft bristle tooth brush  - Limit straining and forceful nose blowing  Outcome: Progressing     Problem: NEUROLOGICAL - ADULT  Goal: Achieves stable or improved neurological status  Description: INTERVENTIONS  - Assess for and report changes in neurological status  - Initiate measures to prevent increased intracranial pressure  - Maintain blood pressure and fluid volume within ordered parameters to optimize cerebral perfusion and minimize risk of hemorrhage  - Monitor temperature, glucose, and sodium. Initiate appropriate interventions as ordered  Outcome: Progressing     Problem: Impaired Functional Mobility  Goal: Achieve highest/safest level of mobility/gait  Description: Interventions:  - Assess patient's functional ability and  stability  - Promote increasing activity/tolerance for mobility and gait  - Educate and engage patient/family in tolerated activity level and precautions    Outcome: Progressing     Problem: Impaired Activities of Daily Living  Goal: Achieve highest/safest level of independence in self care  Description: Interventions:  - Assess ability and encourage patient to participate in ADLs to maximize function  - Promote sitting position while performing ADLs such as feeding, grooming, and bathing  - Educate and encourage patient/family in tolerated functional activity level and precautions during self-care    Outcome: Progressing   Patient ambulated in the hallway with PT, became a little dizzy, TERESA drain removed, [place] removed, Penny removed, patient tolerated general diet well. Likely discharge tomorrow.

## 2024-04-10 NOTE — PROGRESS NOTES
Neurosurgery Progress Note    Assessment:   Miranda Oliveros in room 238/238-A, is a 68 year old female, Hospital Day ( LOS: 1 day ) hospitalized for <principal problem not specified>.      1 Day Post-Op s/p Procedure(s):  Cervcal 1 to cervical 2 posterior fixation and fusion    The patient's other hospital problems include:   Active Hospital Problems    S/P cervical spinal fusion      Surgical hemovac drain was removed without complication. Patient tolerated removal well. Dressed with gauze and band-aid.   Plan:   -Appropriate for downgrade from a neurosurgical standpoint   -Discontinue arterial line  -Discontinue barajas when ambulating   -q4h neurochecks   -Continue pain control regimen  -PT/OT  -[place] J collar when up and ambulating.  Patient requires a [place] J collar to restrict motion of the neck to reduce pain and promote healing.   Okay to remove when sitting/lying in a chair or in bed  -Discharge planning. Patient is appropriate for discharge today from a neurosurgical standpoint if medically cleared and cleared by therapy.  -Will coordinate to get patient a bone growth stimulator with [facility] for home use  -81 mg aspirin daily for at least 3 months, followed by CTA to determine further need for aspirin       Plan of care discussed with Dr. King   Subjective:   Patient reports that she is doing well this morning.  She notes some discomfort in her neck, but reports that it is tolerable overall.  She has no new neurologic complaints.    Objective:   VITALS: /67 (BP Location: Left arm)   Pulse 93   Temp 97.9 °F (36.6 °C) (Temporal)   Resp 15   Ht 63\"   Wt 176 lb 5.9 oz (80 kg)   LMP  (LMP Unknown)   SpO2 94%   BMI 31.24 kg/m²  Temp (24hrs), Av.7 °F (36.5 °C), Min:97.1 °F (36.2 °C), Max:98.4 °F (36.9 °C)       General: Well developed, well nourished, in no acute distress.     Incision: Dressing in place. Minimal strikethrough. No gross drainage.      Drain:   Output by Drain (mL) 24  0700 - 04/08/24 1859 04/08/24 1900 - 04/09/24 0659 04/09/24 0700 - 04/09/24 1859 04/09/24 1900 - 04/10/24 0659 04/10/24 0700 - 04/10/24 0846   Closed Drain Posterior Neck Accordion 10 Fr.    0         Neurological / Musculoskeletal: Awake, alert, and interactive. Recent and remote memory appear intact. Attention span and concentration are appropriate. No dysarthria. Coordination and motor control grossly intact. Patient follows commands briskly and appropriately. No pronator drift.    Cervical Spine:    Motor/ Extremities   Deltoid Biceps Triceps    Sensation   R 5/5 5/5 5/5 5/5 Intact to light touch   L 5/5 5/5 5/5 5/5 Intact to light touch       Lumbar Spine:    Motor / Extremities   Hip   flexion Knee   extension Dorsiflexion Plantarflexion   Sensation   R 5/5 5/5 5/5 5/5 Intact to light touch   L 5/5 5/5 5/5 5/5 Intact to light touch       Cranial Nerves:  I Smell not tested   II Visual acuity at baseline.  Visual fields intact.  Pupils equal, round, reactive to light and accommodating bilaterally.   III, VII No ptosis appreciated   III, IV, VI EOMs intact with full ROM   V Facial sensation intact to light touch   VII No facial asymmetry   VIII Hearing normal to voice   IX, X Soft palate elevation and gag reflex not tested   XI Spinal accessory muscles intact with 5/5 strength bilaterally   XII Tongue strength normal, midline, without fasciculations or deviations       I&O: I/O last 3 completed shifts:  In: 2591 [P.O.:200; I.V.:2351; IV PIGGYBACK:40]  Out: 1800 [Urine:1650; Blood:150]       Labs:   Recent Labs   Lab 04/09/24  1549   HGB 12.7   HCT 35.6     Recent Labs   Lab 04/09/24  1851 04/10/24  0613   *  --    BUN 9  --    CREATSERUM 0.64  --    EGFRCR 97  --    CA 8.5*  --      --    K 3.2* 4.0     --    CO2 25.0  --       No results for input(s): \"PT\", \"INR\", \"PTT\" in the last 168 hours.     Imaging:  CTA BRAIN + CTA CAROTIDS (CPT=70496/03041)    Result Date: 4/9/2024  CONCLUSION:   1. Limited assessment of the junction of the V2/V3 segments both vertebral arteries at the C2 level due to streak artifact from the adjacent surgical hardware.  However, there is a suspected focally severe stenosis of the left vertebral artery at C2 where the left pedicular screw appears to course through the superior aspect of the transverse foramen adjacent to the vessel.  The remainder of the left vertebral artery is patent. 2. No flow limiting stenosis or occlusion of the internal carotid arteries or major intracranial arteries. 3. Postoperative changes from a recent posterior instrumented fusion at C1-C2 due to a known displaced comminuted burst fracture of C1. 4. No acute intracranial process by noncontrast CT technique. 5. Stable mild generalized atrophy and mild chronic microangiopathic ischemic changes.   Results of this examination were discussed with the patient's physician, Dr. Bartolo King, by Dr. Salomon at 22:40 on 04/09/2024.  Dictated by (CST): Coy Salomon MD on 4/09/2024 at 10:10 PM     Finalized by (CST): Coy Salomon MD on 4/09/2024 at 10:41 PM          Imaging reviewed     Is this a shared or split note between Advanced Practice Provider and Physician? Yes    John Lundberg PA-C  Physician Assistant- Neurosurgery   [facility]  4/10/2024, 8:46 AM        This document was created using Dragon voice recognition software and transcription variances may occur. Please contact documenting provider for clarification of contents if needed.

## 2024-04-10 NOTE — PROGRESS NOTES
[facility]  part of [facility]     Progress Note    Miranda Oliveros Patient Status:  Inpatient    1955 MRN [id number]   Location [facility] 2W/SW Attending Bartolo King MD   Hosp Day # 1 PCP Jhonathan Neal MD       Subjective:   Patient seen and examined.  Admits to some mild neck discomfort.  No focal motor deficits or dysphagia.    Objective:   Blood pressure 124/74, pulse 94, temperature 97.9 °F (36.6 °C), temperature source Temporal, resp. rate 13, height 5' 3\" (1.6 m), weight 176 lb 5.9 oz (80 kg), SpO2 96%, not currently breastfeeding.  Intake/Output:   Last 3 shifts: I/O last 3 completed shifts:  In: 2591 [P.O.:200; I.V.:2351; IV PIGGYBACK:40]  Out: 1800 [Urine:1650; Blood:150]   This shift: No intake/output data recorded.     Vent Settings:      Hemodynamic parameters (last 24 hours):      Scheduled Meds:   Current Facility-Administered Medications   Medication Dose Route Frequency    lactated ringers infusion   Intravenous Continuous    sodium chloride 0.9 % IV bolus 500 mL  500 mL Intravenous Once PRN    sennosides (Senokot) tab 17.2 mg  17.2 mg Oral Nightly    docusate sodium (Colace) cap 100 mg  100 mg Oral BID    polyethylene glycol (PEG 3350) (Miralax) 17 g oral packet 17 g  17 g Oral Daily PRN    magnesium hydroxide (Milk of Magnesia) 400 MG/5ML oral suspension 30 mL  30 mL Oral Daily PRN    bisacodyl (Dulcolax) 10 MG rectal suppository 10 mg  10 mg Rectal Daily PRN    fleet enema (Fleet) 7-19 GM/118ML rectal enema 133 mL  1 enema Rectal Once PRN    ondansetron (Zofran) 4 MG/2ML injection 4 mg  4 mg Intravenous Q6H PRN    metoclopramide (Reglan) 5 mg/mL injection 10 mg  10 mg Intravenous Q8H PRN    diphenhydrAMINE (Benadryl) cap/tab 25 mg  25 mg Oral Q4H PRN    Or    diphenhydrAMINE (Benadryl) 50 mg/mL  injection 25 mg  25 mg Intravenous Q4H PRN    benzocaine-menthol (Cepacol) lozenge 1 lozenge  1 lozenge Buccal Q15 Min PRN    sodium chloride 0.9% infusion    Intravenous Continuous    enoxaparin (Lovenox) 40 MG/0.4ML SUBQ injection 40 mg  40 mg Subcutaneous Daily    oxyCODONE immediate release tab 2.5 mg  2.5 mg Oral Q4H PRN    Or    oxyCODONE immediate release tab 5 mg  5 mg Oral Q4H PRN    HYDROmorphone (Dilaudid) 1 MG/ML injection 0.2 mg  0.2 mg Intravenous Q2H PRN    Or    HYDROmorphone (Dilaudid) 1 MG/ML injection 0.4 mg  0.4 mg Intravenous Q2H PRN    methocarbamol (Robaxin) tab 500 mg  500 mg Oral q6h    acetaminophen (Ofirmev) 10 mg/mL infusion premix 1,000 mg  1,000 mg Intravenous Q6H    atorvastatin (Lipitor) tab 10 mg  10 mg Oral Daily    cetirizine (ZyrTEC) tab 10 mg  10 mg Oral Daily    montelukast (Singulair) tab 10 mg  10 mg Oral Nightly    pantoprazole (Protonix) DR tab 20 mg  20 mg Oral QAM    losartan (Cozaar) tab 100 mg  100 mg Oral Daily    hydrALAzine (Apresoline) 20 mg/mL injection 10 mg  10 mg Intravenous Q1H PRN    labetalol (Trandate) 5 mg/mL injection 10 mg  10 mg Intravenous Q10 Min PRN    aspirin chewable tab 81 mg  81 mg Oral Daily       Continuous Infusions:    lactated ringers 20 mL/hr at 04/09/24 0912    sodium chloride 100 mL/hr at 04/09/24 1730       Physical Exam  Constitutional: no acute distress  Eyes: PERRL  ENT: nares pateint  Neck: supple, no JVD  Cardio: RRR, S1 S2  Respiratory: clear to auscultation bilaterally, no wheezing, rales, rhonchi, crackles  GI: abdomen soft, non tender, active bowel sounds, no organomegaly  Extremities: no clubbing, cyanosis, edema  Neurologic: no gross motor deficits  Skin: warm, dry      Results:     Lab Results   Component Value Date    HGB 12.7 04/09/2024    HCT 35.6 04/09/2024    CREATSERUM 0.64 04/09/2024    BUN 9 04/09/2024     04/09/2024    K 4.0 04/10/2024     04/09/2024    CO2 25.0 04/09/2024     04/09/2024    CA 8.5 04/09/2024       CTA BRAIN + CTA CAROTIDS (CPT=70496/88110)    Result Date: 4/9/2024  CONCLUSION:  1. Limited assessment of the junction of the V2/V3 segments  both vertebral arteries at the C2 level due to streak artifact from the adjacent surgical hardware.  However, there is a suspected focally severe stenosis of the left vertebral artery at C2 where the left pedicular screw appears to course through the superior aspect of the transverse foramen adjacent to the vessel.  The remainder of the left vertebral artery is patent. 2. No flow limiting stenosis or occlusion of the internal carotid arteries or major intracranial arteries. 3. Postoperative changes from a recent posterior instrumented fusion at C1-C2 due to a known displaced comminuted burst fracture of C1. 4. No acute intracranial process by noncontrast CT technique. 5. Stable mild generalized atrophy and mild chronic microangiopathic ischemic changes.   Results of this examination were discussed with the patient's physician, Dr. Bartolo King, by Dr. Salomon at 22:40 on 04/09/2024.  Dictated by (CST): Coy Salomon MD on 4/09/2024 at 10:10 PM     Finalized by (CST): Coy Salomon MD on 4/09/2024 at 10:41 PM          XR FLUOROSCOPY C-ARM TIME LESS THAN 1 HOUR (CPT=76000)    Result Date: 4/9/2024  CONCLUSION: See above.   Dictated by (CST): Edilberto Christensen MD on 4/09/2024 at 3:45 PM     Finalized by (CST): Edilberto Christensen MD on 4/09/2024 at 3:47 PM                 Assessment   1.  POD #1 status post C1/C2 posterior fixation and fusion  2.  C1 fracture  3.  Hypertension  4.  Hyperlipidemia     Plan   -Patient presents today for scheduled C1-2 C2 posterior fixation and fusion.  Recent history of C1 fracture  -Pain control  -Close neuromonitoring  -Antihypertensives as need be  -Further recommendations per neurosurgery  -DVT prophylaxis: Duncan Pugh,   Pulmonary Critical Care Medicine  [facility]

## 2024-04-10 NOTE — PHYSICAL THERAPY NOTE
PHYSICAL THERAPY EVALUATION - INPATIENT     Room Number: 238/238-A  Evaluation Date: 4/10/2024  Type of Evaluation: Initial   Physician Order: PT Eval and Treat    Presenting Problem:  (C1 fx s/p posteriro fusion)     Reason for Therapy: Mobility Dysfunction and Discharge Planning    PHYSICAL THERAPY ASSESSMENT   Patient is a 68 year old female admitted 2024 for posterior spinal fusion.  Prior to admission, patient's baseline is to go home.  Patient is currently functioning at baseline with bed mobility, transfers, and gait.  Patient is requiring supervision as a result of the following impairments: pain.  Physical Therapy will continue to follow for duration of hospitalization.    Patient will benefit from continued skilled PT Services at discharge to promote functional independence and safety with additional support and return home.    PLAN  PT Treatment Plan: Bed mobility;Patient education;Family education;Gait training;Transfer training  Rehab Potential : Fair  Frequency (Obs): Daily    PHYSICAL THERAPY MEDICAL/SOCIAL HISTORY   History related to current admission:      Problem List  Active Problems:    S/P cervical spinal fusion      HOME SITUATION  Home Situation  Type of Home: House  Home Layout: Two level  Lives With: Spouse  Patient Regularly Uses: None     Prior Level of [place]: ind    SUBJECTIVE  \"I feel dizzy\"    PHYSICAL THERAPY EXAMINATION   OBJECTIVE  Precautions: Cervical brace;Spine ([place] J) for comfort  Fall Risk: High fall risk    WEIGHT BEARING RESTRICTION  Weight Bearing Restriction: None                PAIN ASSESSMENT  Ratin          COGNITION  Overall Cognitive Status:  WFL - within functional limits    RANGE OF MOTION AND STRENGTH ASSESSMENT  Upper extremity ROM and strength are within functional limits   Lower extremity ROM is within functional limits   Lower extremity strength is within functional limits     BALANCE  Static Sitting: Fair  Dynamic Sitting: Fair  Static  Standing: Fair  Dynamic Standing: Fair -    ACTIVITY TOLERANCE           BP: 132/74             O2 WALK       AM-PAC '6-Clicks' INPATIENT SHORT FORM - BASIC MOBILITY  How much difficulty does the patient currently have...  Patient Difficulty: Turning over in bed (including adjusting bedclothes, sheets and blankets)?: A Little   Patient Difficulty: Sitting down on and standing up from a chair with arms (e.g., wheelchair, bedside commode, etc.): A Little   Patient Difficulty: Moving from lying on back to sitting on the side of the bed?: A Little   How much help from another person does the patient currently need...   Help from Another: Moving to and from a bed to a chair (including a wheelchair)?: A Little   Help from Another: Need to walk in hospital room?: A Little   Help from Another: Climbing 3-5 steps with a railing?: A Little     AM-PAC Score:  Raw Score: 18   Approx Degree of Impairment: 46.58%   Standardized Score (AM-PAC Scale): 43.63   CMS Modifier (G-Code): CK    FUNCTIONAL ABILITY STATUS  Functional Mobility/Gait Assessment  Gait Assistance: Contact guard assist  Distance (ft): 220  Assistive Device: Rolling walker  Rolling: stand-by assist  Supine to Sit: contact guard assist  Sit to Supine: SBA  Sit to Stand: stand-by assist    Exercise/Education Provided:  Bed mobility  Gait training  Transfer training    The patient's Approx Degree of Impairment: 46.58% has been calculated based on documentation in the [facility] '6 clicks' Inpatient Basic Mobility Short Form.  Research supports that patients with this level of impairment may benefit from home.  Final disposition will be made by interdisciplinary medical team.    Patient End of Session: Up in chair    CURRENT GOALS  Goals to be met by: 4/24  Patient Goal Patient's self-stated goal is: to go home   Goal #1 Patient is able to demonstrate supine - sit EOB @ level: supervision     Goal #1   Current Status    Goal #2 Patient is able to demonstrate transfers Sit  to/from Stand at assistance level: supervision with walker - rolling     Goal #2  Current Status    Goal #3 Patient is able to ambulate 300 feet with assist device: walker - rolling at assistance level: supervision   Goal #3   Current Status    Goal #4 Patient will negotiate 3 stairs/one curb w/ assistive device and supervision   Goal #4   Current Status    Goal #5 Patient to demonstrate independence with home activity/exercise instructions provided to patient in preparation for discharge.   Goal #5   Current Status    Goal #6    Goal #6  Current Status      Patient Evaluation Complexity Level:  History Low - no personal factors and/or co-morbidities   Examination of body systems Low -  addressing 1-2 elements   Clinical Presentation Low- Stable   Clinical Decision Making  Low Complexity     Gait Trainin minutes

## 2024-04-10 NOTE — TELEPHONE ENCOUNTER
Staff message received    \"John Lundberg PA-C P [facility] 2 Nurse  Hi,    This patient will need a bone growth stimulator from [facility]. The contact is Gabino Solorio. Thank you!    Please fax DME order, patient face sheet, most recent H&P, front/back picture of insurance card, and op report/brief op note if available to Gabino Solorio at [facility] for a bone growth stimulator.    BGS Type: Cervical    Fax: [phone]  Phone: [phone]\"    Routed to nursing staff currently in office for order and faxing of forms.  Pt in surgery: POSTERIOR CERVICAL LAMINECTOMY FORAMINOTOMY 1 LEV yesterday.

## 2024-04-10 NOTE — OCCUPATIONAL THERAPY NOTE
OCCUPATIONAL THERAPY EVALUATION - INPATIENT     Room Number: 238/238-A  Evaluation Date: 4/10/2024  Type of Evaluation: Initial  Presenting Problem: C1-C2 posterior fixation    Physician Order: IP Consult to Occupational Therapy  Reason for Therapy: ADL/IADL Dysfunction and Discharge Planning    OCCUPATIONAL THERAPY ASSESSMENT   Patient is a 68 year old female admitted 4/9/2024 for C1-C2 posterior fixation; orders for [place] at all times, however, reached out to surgeon as pt not wearing while in bed upon therapy arrival; clarified: \"[place] J collar when up and ambulating.  Patient requires a [place] J collar to restrict motion of the neck to reduce pain and promote healing.  Okay to remove when sitting/lying in a chair or in bed.\"  Prior to admission, patient's baseline is assist PRN with ADLs (LE dressing) and managing other self care typically Mod I.  Patient is currently functioning near baseline with self care, transfers, and functional mobility.  Patient is requiring up to [place] A as a result of the following impairments: pain, activity [place]. Occupational Therapy will continue to follow for duration of hospitalization.    Patient will benefit from continued skilled OT Services For duration of hospitalization, however, given the patient is functioning near baseline level do not anticipate skilled therapy needs at discharge  Recommend increased support.     PLAN  OT Treatment Plan: Balance activities;Energy conservation/work simplification techniques;ADL training;Functional transfer training;Endurance training;Patient/Family education;Patient/Family training;Compensatory technique education  OT Device Recommendations: TBD    OCCUPATIONAL THERAPY MEDICAL/SOCIAL HISTORY   Problem List   Active Problems:    S/P cervical spinal fusion    HOME SITUATION  Type of Home: House  Home Layout: Two level  Lives With: Spouse  Patient Regularly Uses: None      SUBJECTIVE  Melanie been wearing that collar for  awhile    OCCUPATIONAL THERAPY EXAMINATION   OBJECTIVE  Precautions: Spine  Fall Risk: High fall risk    WEIGHT BEARING RESTRICTION     PAIN ASSESSMENT  Ratin  Location: spine  Management Techniques: Activity promotion    COGNITION  Overall Cognitive Status:  WFL - within functional limits    RANGE OF MOTION   Upper extremity ROM is within functional limits     STRENGTH ASSESSMENT  Upper extremity strength is within functional limits     ACTIVITIES OF DAILY LIVING ASSESSMENT  AM-PAC ‘6-Clicks’ Inpatient Daily Activity Short Form  How much help from another person does the patient currently need…  -   Putting on and taking off regular lower body clothing?: A Little  -   Bathing (including washing, rinsing, drying)?: A Little  -   Toileting, which includes using toilet, bedpan or urinal? : A Little  -   Putting on and taking off regular upper body clothing?: A Little  -   Taking care of personal grooming such as brushing teeth?: A Little  -   Eating meals?: A Little    AM-PAC Score:  Score: 18  Approx Degree of Impairment: 46.65%  Standardized Score (AM-PAC Scale): 38.66  CMS Modifier (G-Code): CK    FUNCTIONAL TRANSFER ASSESSMENT  Sit to Stand: Edge of Bed  Edge of Bed: Contact Guard Assist    BED MOBILITY  Rolling: Contact Guard Assist  Supine to Sit : Contact Guard Assist  Scooting: CGA    BALANCE ASSESSMENT  Static Sitting: Stand-by Assist  Static Standing: Contact Guard Assist    FUNCTIONAL ADL ASSESSMENT  Eating: Supervision  Grooming Seated: Stand-by Assist  Bathing Seated: Minimal Assist  UB Dressing Seated: Stand-by Assist  LB Dressing Seated: Minimal Assist  Toileting Seated: Minimal Assist      Skilled Therapy Provided:    Skilled Therapy Provided:    Therapeutic Activities: Patient rcvd in bed; Prior to initiating dynamic activities, provided education on spine precautions and log roll for OOB activities. Patient's vitals were monitored closely with position changes to ensure appropriate response to  activities. Patient was able to complete bed mobility with log roll technique with CGA assist and cues to sequence and for hand placement; Patient tolerated sitting  at EOB with Supervision; Demonstrated good static sitting balance. Educated on STS sequencing and hand placement; transitions completed with CGA. Tolerating functional mobility with RW >household distances with CGA. Functional TF completed with CGA. Education provided on increased ADITYA and to limit forward flexion for STS. While engaged in dynamic activities; pt encouraged to pace tasks allow time for rest breaks- standing or seated. Patient encouraged to complete as many basic tasks seated as allowed for more tolerance for dynamic activities.       ADL Retraining/Engagment:  Patient was unable to demonstrate figure 4 for LE dressing; 2/2 prior knee/hip issues; reports spouse will be able to assist;  Patient demonstrates sufficient  UE ROM/Strength to address UE self care including feeding, grooming, and dressing.     Education Provided: Role of OT, POC, Safety, DC recs, Activity promotion, Activity pacing, Energy Conservation Strategies, Return to Home Safety.     EDUCATION PROVIDED  Patient: Role of Occupational Therapy; Plan of Care; Discharge Recommendations; Functional Transfer Techniques; Fall Prevention; Weight Bear Status; Surgical Precautions; Posture/Positioning; Energy Conservation; Compensatory ADL Techniques  Patient's Response to Education: Returned Demonstration; Verbalized Understanding; Requires Further Education    The patient's Approx Degree of Impairment: 46.65% has been calculated based on documentation in the [facility] '6 clicks' Inpatient Daily Activity Short Form.  Research supports that patients with this level of impairment may benefit from home with support.  Final disposition will be made by interdisciplinary medical team.    Patient End of Session: Up in chair;Needs met;Call light within reach;RN aware of session/findings;All  patient questions and concerns addressed    OT Goals  Patient self-stated goal is: to return home      Patient will complete LE dressing with SBA   Comment:     Patient will complete toilet transfer with SBA   Comment:     Patient will complete self care task at sink level with SBA   Comment:     Comment:         Goals  on:   Frequency: 3-5x week     Patient Evaluation Complexity Level:   Occupational Profile/Medical History LOW - Brief history including review of medical or therapy records    Specific performance deficits impacting engagement in ADL/IADL LOW  1 - 3 performance deficits    Client Assessment/Performance Deficits LOW - No comorbidities nor modifications of tasks    Clinical Decision Making LOW - Analysis of occupational profile, problem-focused assessments, limited treatment options    Overall Complexity LOW     OT Session Time: 23 minutes  Self-Care Home Management: 10 minutes  Therapeutic Activity: 13 minutes    Kuldip Livingston Occupational Therapist, OTR/L ext [phone]

## 2024-04-10 NOTE — TELEPHONE ENCOUNTER
Completed order and faxed with insurance card, face sheet, brief op note and last office visit note to [facility].  Confirmation received.

## 2024-04-10 NOTE — DISCHARGE INSTRUCTIONS
Dr. King's Post-operative Instructions  Posterior Cervical Fusion    Refer to this sheet in the next few weeks. These instructions provide you with information on caring for yourself after your procedure. Your treatment has been planned according to current medical practices, but problems sometimes occur. Call your health care provider if you have any problems or questions after your procedure: [phone].          -This surgery was done to stabilize a fracture.    WHAT TO EXPECT AFTER THE PROCEDURE  After your procedure, it is typical to have the following symptoms:    Pain in the back of your neck   Numbness   Weakness     -Following surgery, these post-operative symptoms will improve with time.    HOME CARE INSTRUCTIONS  It can take 6-12 months to recover fully from this procedure. Make sure to follow all of your health care provider's instructions carefully.    Only take medicines as directed by your health care provider.  You will receive narcotic medication for pain, try to use this only as needed and begin to cut back as your pain improves.   You will receive muscle relaxant medication (Robaxin (methocarbamol) or Tizanidine (zanaflex)); Take this medication scheduled three times per day.   You will also receive two stool softeners to take as needed for constipation.   You can eat what you usually do. Occasionally patients experience difficulty swallowing after this procedure. If you experience this, try soft foods and smoothies for a few days.   Your activities will be limited for the first 3-4 weeks. In general:   ? It is fine to walk and climb stairs as much as tolerated.  ? Do not lift anything weighing more than 10 lb (4.5 kg).  ? Do not lift objects over your head.  ? Do not drive until your health care provider says it is okay, at least 7-10 days.     Take showers instead of baths until directed by your health care provider. Ok to let water run over incision but do not scrub over incision or apply  ointments or creams over incision.   You do not require a bandage, the incision will be covered with dermabond (skin glue).   The sutures are beneath the skin and dissolvable. You will not have to have them removed.   You may apply ice to the repaired area:    ? Put ice in a plastic bag.  ? Place a towel between your skin and the bag.  ? Leave the ice on for 20 minutes, 2-3 times a day.     You will return for follow-up appointment approximately two weeks post-op.    Medication  No NSAIDS until okay with surgeon.  NSAIDS (non-steroidal anti-inflammatory) include: Aspirin, Motrin, ibuprofen, Advil, Aleve, Naprosyn, Indocin, Nuprin, Vioxx, Celebrex, Bextra. (COMPLETE LIST IN GUIDEBOOK)  Tylenol (acetaminophen) is okay. Caution if your pain med contains Tylenol (acetaminophen); maximum 3 gms of Tylenol (acetaminophen) in 24 hours.  Take muscle relaxants and pain medications as prescribed allowing 30-45 minutes to take effect.  Do NOT drink alcohol while on pain medication.  Monitor need for pain medication refills closely.  Call pharmacy or physician office at least five days before out of pain medication. If calling physician office after 3 pm, it will be handled on the next business day.    SEEK MEDICAL CARE IF:   You have redness, swelling, or pain in your cut (incision) that is getting worse.   You have pus coming from the incision.   You have a fever.   There is a bad smell coming from the incision or bandage.   The edges of the incision break open after sutures or staples are taken out.   Your pain medicine is not helping.   You have swelling in your calf or leg.   You seem to be getting worse instead of better.     You develop shortness of breath or chest pain.   You have trouble swallowing.   You have pain, numbness, or weakness that is getting worse.    This information is not intended to replace advice given to you by your health care provider. Make sure you discuss any questions you have with your health  care provider.    Document Released: 12/18/2006 Document Revised: 10/23/2017 Document Reviewed: 10/23/2017  [employer]vier Interactive Patient Education © 2018 [employer]

## 2024-04-10 NOTE — PLAN OF CARE
Pt remains Alert and oriented x4 with no neurological deficits. Some bloody drainage on surgical dressing. Minimal drainage from Hemovac. CTA complete. Communication with Neurosurgery throughout. Pain managed with prns.   Problem: Patient Centered Care  Goal: Patient preferences are identified and integrated in the patient's plan of care  Description: Interventions:  - What would you like us to know as we care for you?   - Provide timely, complete, and accurate information to patient/family  - Incorporate patient and family knowledge, values, beliefs, and cultural backgrounds into the planning and delivery of care  - Encourage patient/family to participate in care and decision-making at the level they choose  - Honor patient and family perspectives and choices  Outcome: Progressing     Problem: CARDIOVASCULAR - ADULT  Goal: Maintains optimal cardiac output and hemodynamic stability  Description: INTERVENTIONS:  - Monitor vital signs, rhythm, and trends  - Monitor for bleeding, hypotension and signs of decreased cardiac output  - Evaluate effectiveness of vasoactive medications to optimize hemodynamic stability  - Monitor arterial and/or venous puncture sites for bleeding and/or hematoma  - Assess quality of pulses, skin color and temperature  - Assess for signs of decreased coronary artery perfusion - ex. Angina  - Evaluate fluid balance, assess for edema, trend weights  Outcome: Progressing  Goal: Absence of cardiac arrhythmias or at baseline  Description: INTERVENTIONS:  - Continuous cardiac monitoring, monitor vital signs, obtain 12 lead EKG if indicated  - Evaluate effectiveness of antiarrhythmic and heart rate control medications as ordered  - Initiate emergency measures for life threatening arrhythmias  - Monitor electrolytes and administer replacement therapy as ordered  Outcome: Progressing     Problem: RESPIRATORY - ADULT  Goal: Achieves optimal ventilation and oxygenation  Description: INTERVENTIONS:  -  Assess for changes in respiratory status  - Assess for changes in mentation and behavior  - Position to facilitate oxygenation and minimize respiratory effort  - Oxygen supplementation based on oxygen saturation or ABGs  - Provide Smoking Cessation handout, if applicable  - Encourage broncho-pulmonary hygiene including cough, deep breathe, Incentive Spirometry  - Assess the need for suctioning and perform as needed  - Assess and instruct to report SOB or any respiratory difficulty  - Respiratory Therapy support as indicated  - Manage/alleviate anxiety  - Monitor for signs/symptoms of CO2 retention  Outcome: Progressing     Problem: GASTROINTESTINAL - ADULT  Goal: Minimal or absence of nausea and vomiting  Description: INTERVENTIONS:  - Maintain adequate hydration with IV or PO as ordered and tolerated  - Nasogastric tube to low intermittent suction as ordered  - Evaluate effectiveness of ordered antiemetic medications  - Provide nonpharmacologic comfort measures as appropriate  - Advance diet as tolerated, if ordered  - Obtain nutritional consult as needed  - Evaluate fluid balance  Outcome: Progressing     Problem: METABOLIC/FLUID AND ELECTROLYTES - ADULT  Goal: Electrolytes maintained within normal limits  Description: INTERVENTIONS:  - Monitor labs and rhythm and assess patient for signs and symptoms of electrolyte imbalances  - Administer electrolyte replacement as ordered  - Monitor response to electrolyte replacements, including rhythm and repeat lab results as appropriate  - Fluid restriction as ordered  - Instruct patient on fluid and nutrition restrictions as appropriate  Outcome: Progressing     Problem: SKIN/TISSUE INTEGRITY - ADULT  Goal: Skin integrity remains intact  Description: INTERVENTIONS  - Assess and document risk factors for pressure ulcer development  - Assess and document skin integrity  - Monitor for areas of redness and/or skin breakdown  - Initiate interventions, skin care  algorithm/standards of care as needed  Outcome: Progressing  Goal: Incision(s), wounds(s) or drain site(s) healing without S/S of infection  Description: INTERVENTIONS:  - Assess and document risk factors for pressure ulcer development  - Assess and document skin integrity  - Assess and document dressing/incision, wound bed, drain sites and surrounding tissue  - Implement wound care per orders  - Initiate isolation precautions as appropriate  - Initiate Pressure Ulcer prevention bundle as indicated  Outcome: Progressing     Problem: HEMATOLOGIC - ADULT  Goal: Maintains hematologic stability  Description: INTERVENTIONS  - Assess for signs and symptoms of bleeding or hemorrhage  - Monitor labs and vital signs for trends  - Administer supportive blood products/factors, fluids and medications as ordered and appropriate  - Administer supportive blood products/factors as ordered and appropriate  Outcome: Progressing  Goal: Free from bleeding injury  Description: (Example usage: patient with low platelets)  INTERVENTIONS:  - Avoid intramuscular injections, enemas and rectal medication administration  - Ensure safe mobilization of patient  - Hold pressure on venipuncture sites to achieve adequate hemostasis  - Assess for signs and symptoms of internal bleeding  - Monitor lab trends  - Patient is to report abnormal signs of bleeding to staff  - Avoid use of toothpicks and dental floss  - Use electric shaver for shaving  - Use soft bristle tooth brush  - Limit straining and forceful nose blowing  Outcome: Progressing     Problem: NEUROLOGICAL - ADULT  Goal: Achieves stable or improved neurological status  Description: INTERVENTIONS  - Assess for and report changes in neurological status  - Initiate measures to prevent increased intracranial pressure  - Maintain blood pressure and fluid volume within ordered parameters to optimize cerebral perfusion and minimize risk of hemorrhage  - Monitor temperature, glucose, and sodium.  Initiate appropriate interventions as ordered  Outcome: Progressing     Problem: Impaired Functional Mobility  Goal: Achieve highest/safest level of mobility/gait  Description: Interventions:  - Assess patient's functional ability and stability  - Promote increasing activity/tolerance for mobility and gait  - Educate and engage patient/family in tolerated activity level and precautions    Outcome: Progressing     Problem: Impaired Activities of Daily Living  Goal: Achieve highest/safest level of independence in self care  Description: Interventions:  - Assess ability and encourage patient to participate in ADLs to maximize function  - Promote sitting position while performing ADLs such as feeding, grooming, and bathing  - Educate and encourage patient/family in tolerated functional activity level and precautions during self-care    Outcome: Progressing     Problem: PAIN - ADULT  Goal: Verbalizes/displays adequate comfort level or patient's stated pain goal  Description: INTERVENTIONS:  - Encourage pt to monitor pain and request assistance  - Assess pain using appropriate pain scale  - Administer analgesics based on type and severity of pain and evaluate response  - Implement non-pharmacological measures as appropriate and evaluate response  - Consider cultural and social influences on pain and pain management  - Manage/alleviate anxiety  - Utilize distraction and/or relaxation techniques  - Monitor for opioid side effects  - Notify MD/LIP if interventions unsuccessful or patient reports new pain  - Anticipate increased pain with activity and pre-medicate as appropriate  Outcome: Progressing

## 2024-04-11 ENCOUNTER — APPOINTMENT (OUTPATIENT)
Dept: GENERAL RADIOLOGY | Facility: HOSPITAL | Age: 69
End: 2024-04-11
Attending: STUDENT IN AN ORGANIZED HEALTH CARE EDUCATION/TRAINING PROGRAM
Payer: COMMERCIAL

## 2024-04-11 VITALS
SYSTOLIC BLOOD PRESSURE: 139 MMHG | DIASTOLIC BLOOD PRESSURE: 75 MMHG | WEIGHT: 176.38 LBS | TEMPERATURE: 98 F | HEART RATE: 120 BPM | OXYGEN SATURATION: 96 % | BODY MASS INDEX: 31.25 KG/M2 | RESPIRATION RATE: 21 BRPM | HEIGHT: 63 IN

## 2024-04-11 LAB
ANION GAP SERPL CALC-SCNC: 5 MMOL/L (ref 0–18)
BLOOD TYPE BARCODE: 5100
BUN BLD-MCNC: 8 MG/DL (ref 9–23)
BUN/CREAT SERPL: 12.5 (ref 10–20)
CALCIUM BLD-MCNC: 9.2 MG/DL (ref 8.7–10.4)
CHLORIDE SERPL-SCNC: 106 MMOL/L (ref 98–112)
CO2 SERPL-SCNC: 28 MMOL/L (ref 21–32)
CREAT BLD-MCNC: 0.64 MG/DL
EGFRCR SERPLBLD CKD-EPI 2021: 97 ML/MIN/1.73M2 (ref 60–?)
GLUCOSE BLD-MCNC: 112 MG/DL (ref 70–99)
HGB BLD-MCNC: 11.3 G/DL
OSMOLALITY SERPL CALC.SUM OF ELEC: 287 MOSM/KG (ref 275–295)
POTASSIUM SERPL-SCNC: 4 MMOL/L (ref 3.5–5.1)
SODIUM SERPL-SCNC: 139 MMOL/L (ref 136–145)
UNIT VOLUME: 350 ML

## 2024-04-11 PROCEDURE — 72040 X-RAY EXAM NECK SPINE 2-3 VW: CPT | Performed by: STUDENT IN AN ORGANIZED HEALTH CARE EDUCATION/TRAINING PROGRAM

## 2024-04-11 PROCEDURE — 99232 SBSQ HOSP IP/OBS MODERATE 35: CPT | Performed by: INTERNAL MEDICINE

## 2024-04-11 NOTE — PLAN OF CARE
Problem: Patient Centered Care  Goal: Patient preferences are identified and integrated in the patient's plan of care  Description: Interventions:  - What would you like us to know as we care for you?   - Provide timely, complete, and accurate information to patient/family  - Incorporate patient and family knowledge, values, beliefs, and cultural backgrounds into the planning and delivery of care  - Encourage patient/family to participate in care and decision-making at the level they choose  - Honor patient and family perspectives and choices  Outcome: Completed     Problem: CARDIOVASCULAR - ADULT  Goal: Maintains optimal cardiac output and hemodynamic stability  Description: INTERVENTIONS:  - Monitor vital signs, rhythm, and trends  - Monitor for bleeding, hypotension and signs of decreased cardiac output  - Evaluate effectiveness of vasoactive medications to optimize hemodynamic stability  - Monitor arterial and/or venous puncture sites for bleeding and/or hematoma  - Assess quality of pulses, skin color and temperature  - Assess for signs of decreased coronary artery perfusion - ex. Angina  - Evaluate fluid balance, assess for edema, trend weights  Outcome: Completed  Goal: Absence of cardiac arrhythmias or at baseline  Description: INTERVENTIONS:  - Continuous cardiac monitoring, monitor vital signs, obtain 12 lead EKG if indicated  - Evaluate effectiveness of antiarrhythmic and heart rate control medications as ordered  - Initiate emergency measures for life threatening arrhythmias  - Monitor electrolytes and administer replacement therapy as ordered  Outcome: Completed     Problem: RESPIRATORY - ADULT  Goal: Achieves optimal ventilation and oxygenation  Description: INTERVENTIONS:  - Assess for changes in respiratory status  - Assess for changes in mentation and behavior  - Position to facilitate oxygenation and minimize respiratory effort  - Oxygen supplementation based on oxygen saturation or ABGs  -  Provide Smoking Cessation handout, if applicable  - Encourage broncho-pulmonary hygiene including cough, deep breathe, Incentive Spirometry  - Assess the need for suctioning and perform as needed  - Assess and instruct to report SOB or any respiratory difficulty  - Respiratory Therapy support as indicated  - Manage/alleviate anxiety  - Monitor for signs/symptoms of CO2 retention  Outcome: Completed     Problem: GASTROINTESTINAL - ADULT  Goal: Minimal or absence of nausea and vomiting  Description: INTERVENTIONS:  - Maintain adequate hydration with IV or PO as ordered and tolerated  - Nasogastric tube to low intermittent suction as ordered  - Evaluate effectiveness of ordered antiemetic medications  - Provide nonpharmacologic comfort measures as appropriate  - Advance diet as tolerated, if ordered  - Obtain nutritional consult as needed  - Evaluate fluid balance  Outcome: Completed     Problem: METABOLIC/FLUID AND ELECTROLYTES - ADULT  Goal: Electrolytes maintained within normal limits  Description: INTERVENTIONS:  - Monitor labs and rhythm and assess patient for signs and symptoms of electrolyte imbalances  - Administer electrolyte replacement as ordered  - Monitor response to electrolyte replacements, including rhythm and repeat lab results as appropriate  - Fluid restriction as ordered  - Instruct patient on fluid and nutrition restrictions as appropriate  Outcome: Completed     Problem: SKIN/TISSUE INTEGRITY - ADULT  Goal: Skin integrity remains intact  Description: INTERVENTIONS  - Assess and document risk factors for pressure ulcer development  - Assess and document skin integrity  - Monitor for areas of redness and/or skin breakdown  - Initiate interventions, skin care algorithm/standards of care as needed  Outcome: Completed  Goal: Incision(s), wounds(s) or drain site(s) healing without S/S of infection  Description: INTERVENTIONS:  - Assess and document risk factors for pressure ulcer development  - Assess  and document skin integrity  - Assess and document dressing/incision, wound bed, drain sites and surrounding tissue  - Implement wound care per orders  - Initiate isolation precautions as appropriate  - Initiate Pressure Ulcer prevention bundle as indicated  Outcome: Completed     Problem: HEMATOLOGIC - ADULT  Goal: Maintains hematologic stability  Description: INTERVENTIONS  - Assess for signs and symptoms of bleeding or hemorrhage  - Monitor labs and vital signs for trends  - Administer supportive blood products/factors, fluids and medications as ordered and appropriate  - Administer supportive blood products/factors as ordered and appropriate  Outcome: Completed  Goal: Free from bleeding injury  Description: (Example usage: patient with low platelets)  INTERVENTIONS:  - Avoid intramuscular injections, enemas and rectal medication administration  - Ensure safe mobilization of patient  - Hold pressure on venipuncture sites to achieve adequate hemostasis  - Assess for signs and symptoms of internal bleeding  - Monitor lab trends  - Patient is to report abnormal signs of bleeding to staff  - Avoid use of toothpicks and dental floss  - Use electric shaver for shaving  - Use soft bristle tooth brush  - Limit straining and forceful nose blowing  Outcome: Completed     Problem: NEUROLOGICAL - ADULT  Goal: Achieves stable or improved neurological status  Description: INTERVENTIONS  - Assess for and report changes in neurological status  - Initiate measures to prevent increased intracranial pressure  - Maintain blood pressure and fluid volume within ordered parameters to optimize cerebral perfusion and minimize risk of hemorrhage  - Monitor temperature, glucose, and sodium. Initiate appropriate interventions as ordered  Outcome: Completed     Problem: Impaired Functional Mobility  Goal: Achieve highest/safest level of mobility/gait  Description: Interventions:  - Assess patient's functional ability and stability  - Promote  increasing activity/tolerance for mobility and gait  - Educate and engage patient/family in tolerated activity level and precautions    Outcome: Completed     Problem: Impaired Activities of Daily Living  Goal: Achieve highest/safest level of independence in self care  Description: Interventions:  - Assess ability and encourage patient to participate in ADLs to maximize function  - Promote sitting position while performing ADLs such as feeding, grooming, and bathing  - Educate and encourage patient/family in tolerated functional activity level and precautions during self-care  Outcome: Completed     Problem: PAIN - ADULT  Goal: Verbalizes/displays adequate comfort level or patient's stated pain goal  Description: INTERVENTIONS:  - Encourage pt to monitor pain and request assistance  - Assess pain using appropriate pain scale  - Administer analgesics based on type and severity of pain and evaluate response  - Implement non-pharmacological measures as appropriate and evaluate response  - Consider cultural and social influences on pain and pain management  - Manage/alleviate anxiety  - Utilize distraction and/or relaxation techniques  - Monitor for opioid side effects  - Notify MD/LIP if interventions unsuccessful or patient reports new pain  - Anticipate increased pain with activity and pre-medicate as appropriate  Outcome: Completed   Patient wet home with the

## 2024-04-11 NOTE — PROGRESS NOTES
[facility]  part of Merged with [facility]     Progress Note    Miranda Oliveros Patient Status:  Inpatient    1955 MRN [id number]   Location [facility] 2W/SW Attending Bartolo King MD   Hosp Day # 2 PCP Jhonathan Neal MD       Subjective:   Patient seen and examined.  Admits to some mild neck discomfort.  No focal motor deficits or dysphagia.    Objective:   Blood pressure 139/75, pulse 120, temperature 98.3 °F (36.8 °C), temperature source Temporal, resp. rate 21, height 5' 3\" (1.6 m), weight 176 lb 5.9 oz (80 kg), SpO2 96%, not currently breastfeeding.  Intake/Output:   Last 3 shifts: I/O last 3 completed shifts:  In: 3031 [P.O.:1480; I.V.:1451; IV PIGGYBACK:100]  Out:  [Urine:215]   This shift: No intake/output data recorded.     Vent Settings:      Hemodynamic parameters (last 24 hours):      Scheduled Meds:   Current Facility-Administered Medications   Medication Dose Route Frequency    lactated ringers infusion   Intravenous Continuous    sennosides (Senokot) tab 17.2 mg  17.2 mg Oral Nightly    docusate sodium (Colace) cap 100 mg  100 mg Oral BID    polyethylene glycol (PEG 3350) (Miralax) 17 g oral packet 17 g  17 g Oral Daily PRN    magnesium hydroxide (Milk of Magnesia) 400 MG/5ML oral suspension 30 mL  30 mL Oral Daily PRN    bisacodyl (Dulcolax) 10 MG rectal suppository 10 mg  10 mg Rectal Daily PRN    fleet enema (Fleet) 7-19 GM/118ML rectal enema 133 mL  1 enema Rectal Once PRN    ondansetron (Zofran) 4 MG/2ML injection 4 mg  4 mg Intravenous Q6H PRN    metoclopramide (Reglan) 5 mg/mL injection 10 mg  10 mg Intravenous Q8H PRN    diphenhydrAMINE (Benadryl) cap/tab 25 mg  25 mg Oral Q4H PRN    Or    diphenhydrAMINE (Benadryl) 50 mg/mL  injection 25 mg  25 mg Intravenous Q4H PRN    benzocaine-menthol (Cepacol) lozenge 1 lozenge  1 lozenge Buccal Q15 Min PRN    sodium chloride 0.9% infusion   Intravenous Continuous    enoxaparin (Lovenox) 40 MG/0.4ML SUBQ injection 40  mg  40 mg Subcutaneous Daily    oxyCODONE immediate release tab 2.5 mg  2.5 mg Oral Q4H PRN    Or    oxyCODONE immediate release tab 5 mg  5 mg Oral Q4H PRN    HYDROmorphone (Dilaudid) 1 MG/ML injection 0.2 mg  0.2 mg Intravenous Q2H PRN    Or    HYDROmorphone (Dilaudid) 1 MG/ML injection 0.4 mg  0.4 mg Intravenous Q2H PRN    methocarbamol (Robaxin) tab 500 mg  500 mg Oral q6h    acetaminophen (Ofirmev) 10 mg/mL infusion premix 1,000 mg  1,000 mg Intravenous Q6H    atorvastatin (Lipitor) tab 10 mg  10 mg Oral Daily    cetirizine (ZyrTEC) tab 10 mg  10 mg Oral Daily    montelukast (Singulair) tab 10 mg  10 mg Oral Nightly    pantoprazole (Protonix) DR tab 20 mg  20 mg Oral QAM    losartan (Cozaar) tab 100 mg  100 mg Oral Daily    hydrALAzine (Apresoline) 20 mg/mL injection 10 mg  10 mg Intravenous Q1H PRN    labetalol (Trandate) 5 mg/mL injection 10 mg  10 mg Intravenous Q10 Min PRN    aspirin chewable tab 81 mg  81 mg Oral Daily       Continuous Infusions:    lactated ringers 20 mL/hr at 04/09/24 0912    sodium chloride Stopped (04/10/24 0600)       Physical Exam  Constitutional: no acute distress  Eyes: PERRL  ENT: nares pateint  Neck: supple, no JVD  Cardio: RRR, S1 S2  Respiratory: clear to auscultation bilaterally, no wheezing, rales, rhonchi, crackles  GI: abdomen soft, non tender, active bowel sounds, no organomegaly  Extremities: no clubbing, cyanosis, edema  Neurologic: no gross motor deficits  Skin: warm, dry      Results:     Lab Results   Component Value Date    HGB 11.3 04/11/2024    CREATSERUM 0.64 04/11/2024    BUN 8 04/11/2024     04/11/2024    K 4.0 04/11/2024     04/11/2024    CO2 28.0 04/11/2024     04/11/2024    CA 9.2 04/11/2024       XR CERVICAL SPINE (2-3 VIEWS) (CPT=72040)    Result Date: 4/11/2024  CONCLUSION:  1. Posterior fusion hardware at C1-C2 grossly intact.    Dictated by (CST): Alex Seaman MD on 4/11/2024 at 10:55 AM     Finalized by (CST): Alex Seaman MD on  4/11/2024 at 10:58 AM          CTA BRAIN + CTA CAROTIDS (CPT=70496/85079)    Result Date: 4/9/2024  CONCLUSION:  1. Limited assessment of the junction of the V2/V3 segments both vertebral arteries at the C2 level due to streak artifact from the adjacent surgical hardware.  However, there is a suspected focally severe stenosis of the left vertebral artery at C2 where the left pedicular screw appears to course through the superior aspect of the transverse foramen adjacent to the vessel.  The remainder of the left vertebral artery is patent. 2. No flow limiting stenosis or occlusion of the internal carotid arteries or major intracranial arteries. 3. Postoperative changes from a recent posterior instrumented fusion at C1-C2 due to a known displaced comminuted burst fracture of C1. 4. No acute intracranial process by noncontrast CT technique. 5. Stable mild generalized atrophy and mild chronic microangiopathic ischemic changes.   Results of this examination were discussed with the patient's physician, Dr. Bartolo King, by Dr. Salomon at 22:40 on 04/09/2024.  Dictated by (CST): Coy Salomon MD on 4/09/2024 at 10:10 PM     Finalized by (CST): Coy Salomon MD on 4/09/2024 at 10:41 PM          XR FLUOROSCOPY C-ARM TIME LESS THAN 1 HOUR (CPT=76000)    Result Date: 4/9/2024  CONCLUSION: See above.   Dictated by (CST): Edilberto Christensne MD on 4/09/2024 at 3:45 PM     Finalized by (CST): Edilberto Christensen MD on 4/09/2024 at 3:47 PM                 Assessment   1.  POD #2 status post C1/C2 posterior fixation and fusion  2.  C1 fracture  3.  Hypertension  4.  Hyperlipidemia     Plan   -Patient presents today for scheduled C1-2 C2 posterior fixation and fusion.  Recent history of C1 fracture  -Pain control  -Close neuromonitoring  -Antihypertensives as need be  -Further recommendations per neurosurgery  -DVT prophylaxis: Lovenox  -Anticipate discharge later today    Cony Pugh DO  Pulmonary Critical Care  Medicine  [facility]

## 2024-04-11 NOTE — PLAN OF CARE
Problem: CARDIOVASCULAR - ADULT  Goal: Maintains optimal cardiac output and hemodynamic stability  Description: INTERVENTIONS:  - Monitor vital signs, rhythm, and trends  - Monitor for bleeding, hypotension and signs of decreased cardiac output  - Evaluate effectiveness of vasoactive medications to optimize hemodynamic stability  - Monitor arterial and/or venous puncture sites for bleeding and/or hematoma  - Assess quality of pulses, skin color and temperature  - Assess for signs of decreased coronary artery perfusion - ex. Angina  - Evaluate fluid balance, assess for edema, trend weights  Outcome: Progressing  Goal: Absence of cardiac arrhythmias or at baseline  Description: INTERVENTIONS:  - Continuous cardiac monitoring, monitor vital signs, obtain 12 lead EKG if indicated  - Evaluate effectiveness of antiarrhythmic and heart rate control medications as ordered  - Initiate emergency measures for life threatening arrhythmias  - Monitor electrolytes and administer replacement therapy as ordered  Outcome: Progressing     Problem: RESPIRATORY - ADULT  Goal: Achieves optimal ventilation and oxygenation  Description: INTERVENTIONS:  - Assess for changes in respiratory status  - Assess for changes in mentation and behavior  - Position to facilitate oxygenation and minimize respiratory effort  - Oxygen supplementation based on oxygen saturation or ABGs  - Provide Smoking Cessation handout, if applicable  - Encourage broncho-pulmonary hygiene including cough, deep breathe, Incentive Spirometry  - Assess the need for suctioning and perform as needed  - Assess and instruct to report SOB or any respiratory difficulty  - Respiratory Therapy support as indicated  - Manage/alleviate anxiety  - Monitor for signs/symptoms of CO2 retention  Outcome: Progressing     Problem: GASTROINTESTINAL - ADULT  Goal: Minimal or absence of nausea and vomiting  Description: INTERVENTIONS:  - Maintain adequate hydration with IV or PO as  ordered and tolerated  - Nasogastric tube to low intermittent suction as ordered  - Evaluate effectiveness of ordered antiemetic medications  - Provide nonpharmacologic comfort measures as appropriate  - Advance diet as tolerated, if ordered  - Obtain nutritional consult as needed  - Evaluate fluid balance  Outcome: Progressing     Problem: METABOLIC/FLUID AND ELECTROLYTES - ADULT  Goal: Electrolytes maintained within normal limits  Description: INTERVENTIONS:  - Monitor labs and rhythm and assess patient for signs and symptoms of electrolyte imbalances  - Administer electrolyte replacement as ordered  - Monitor response to electrolyte replacements, including rhythm and repeat lab results as appropriate  - Fluid restriction as ordered  - Instruct patient on fluid and nutrition restrictions as appropriate  Outcome: Progressing     Problem: SKIN/TISSUE INTEGRITY - ADULT  Goal: Skin integrity remains intact  Description: INTERVENTIONS  - Assess and document risk factors for pressure ulcer development  - Assess and document skin integrity  - Monitor for areas of redness and/or skin breakdown  - Initiate interventions, skin care algorithm/standards of care as needed  Outcome: Progressing  Goal: Incision(s), wounds(s) or drain site(s) healing without S/S of infection  Description: INTERVENTIONS:  - Assess and document risk factors for pressure ulcer development  - Assess and document skin integrity  - Assess and document dressing/incision, wound bed, drain sites and surrounding tissue  - Implement wound care per orders  - Initiate isolation precautions as appropriate  - Initiate Pressure Ulcer prevention bundle as indicated  Outcome: Progressing     Problem: HEMATOLOGIC - ADULT  Goal: Maintains hematologic stability  Description: INTERVENTIONS  - Assess for signs and symptoms of bleeding or hemorrhage  - Monitor labs and vital signs for trends  - Administer supportive blood products/factors, fluids and medications as  ordered and appropriate  - Administer supportive blood products/factors as ordered and appropriate  Outcome: Progressing  Goal: Free from bleeding injury  Description: (Example usage: patient with low platelets)  INTERVENTIONS:  - Avoid intramuscular injections, enemas and rectal medication administration  - Ensure safe mobilization of patient  - Hold pressure on venipuncture sites to achieve adequate hemostasis  - Assess for signs and symptoms of internal bleeding  - Monitor lab trends  - Patient is to report abnormal signs of bleeding to staff  - Avoid use of toothpicks and dental floss  - Use electric shaver for shaving  - Use soft bristle tooth brush  - Limit straining and forceful nose blowing  Outcome: Progressing     Problem: NEUROLOGICAL - ADULT  Goal: Achieves stable or improved neurological status  Description: INTERVENTIONS  - Assess for and report changes in neurological status  - Initiate measures to prevent increased intracranial pressure  - Maintain blood pressure and fluid volume within ordered parameters to optimize cerebral perfusion and minimize risk of hemorrhage  - Monitor temperature, glucose, and sodium. Initiate appropriate interventions as ordered  Outcome: Progressing     Problem: Impaired Functional Mobility  Goal: Achieve highest/safest level of mobility/gait  Description: Interventions:  - Assess patient's functional ability and stability  - Promote increasing activity/tolerance for mobility and gait  - Educate and engage patient/family in tolerated activity level and precautions  Outcome: Progressing     Problem: Impaired Activities of Daily Living  Goal: Achieve highest/safest level of independence in self care  Description: Interventions:  - Assess ability and encourage patient to participate in ADLs to maximize function  - Promote sitting position while performing ADLs such as feeding, grooming, and bathing  - Educate and encourage patient/family in tolerated functional activity  level and precautions during self-care  Outcome: Progressing

## 2024-04-11 NOTE — DISCHARGE SUMMARY
General Medicine Discharge Summary     Patient ID:  Miranda Oliveros  68 year old  8/16/1955    Admit date: 4/9/2024    Discharge date and time: 4/11/2024  1:16 PM     Attending Physician: Sundar Caraballo DO     Consults: IP CONSULT TO PULMONOLOGY  IP CONSULT TO CASE MANAGEMENT    Primary Care Physician: Jhonathan Neal MD     Reason for admission: Elective surgical surgery    Risk For Readmission: Low    Discharge Diagnoses: Closed displaced fracture of first cervical vertebra, unspecified fracture morphology, initial encounter ([facility]) [S12.000A]  S/P cervical spinal fusion  See Additional Discharge Diagnoses in Hospital Course    Discharged Condition: good    Follow-up with labs/images appointments:   Close follow-up with primary care recommended within 2 weeks  -Follow-up with surgery recommended    Exam  Gen: No acute distress  Pulm: Lungs clear, normal respiratory effort  CV: Heart with regular rate and rhythm  Abd: Abdomen soft,   EXT: no edema     HPI:   Per Dr. Bolivar:      Ms. Oliveros is a 67 yo F with PMH Of HTN, HL GERD who presented for cervical surgery. Patient seen post op, having some neck pain. No CP, SOB, N/V.        Hospital Course:   She was admitted for observation after elective cervical surgery, did well after procedure no complications, discharged home with close follow-up as previously scheduled    Operative Procedures: Procedure(s) (LRB):  Cervcal 1 to cervical 2 posterior fixation and fusion (Bilateral)  POSTERIOR CERVICAL LAMINECTOMY FORAMINOTOMY 1 LEV (N/A)     Imaging: XR CERVICAL SPINE (2-3 VIEWS) (CPT=72040)    Result Date: 4/11/2024  CONCLUSION:  1. Posterior fusion hardware at C1-C2 grossly intact.    Dictated by (CST): Alex Seaman MD on 4/11/2024 at 10:55 AM     Finalized by (CST): Alex Seaman MD on 4/11/2024 at 10:58 AM          CTA BRAIN + CTA CAROTIDS (CPT=70496/28320)    Result Date: 4/9/2024  CONCLUSION:  1. Limited assessment of the junction of the V2/V3 segments both  vertebral arteries at the C2 level due to streak artifact from the adjacent surgical hardware.  However, there is a suspected focally severe stenosis of the left vertebral artery at C2 where the left pedicular screw appears to course through the superior aspect of the transverse foramen adjacent to the vessel.  The remainder of the left vertebral artery is patent. 2. No flow limiting stenosis or occlusion of the internal carotid arteries or major intracranial arteries. 3. Postoperative changes from a recent posterior instrumented fusion at C1-C2 due to a known displaced comminuted burst fracture of C1. 4. No acute intracranial process by noncontrast CT technique. 5. Stable mild generalized atrophy and mild chronic microangiopathic ischemic changes.   Results of this examination were discussed with the patient's physician, Dr. Bartolo King, by Dr. Salomon at 22:40 on 04/09/2024.  Dictated by (CST): Coy Salomon MD on 4/09/2024 at 10:10 PM     Finalized by (CST): Coy Salomon MD on 4/09/2024 at 10:41 PM          XR FLUOROSCOPY C-ARM TIME LESS THAN 1 HOUR (CPT=76000)    Result Date: 4/9/2024  CONCLUSION: See above.   Dictated by (CST): Edilberto Christensen MD on 4/09/2024 at 3:45 PM     Finalized by (CST): Edilberto Christensen MD on 4/09/2024 at 3:47 PM           Disposition: home    Activity: activity as tolerated  Diet: regular diet  Wound Care: none needed  Code Status: Prior  O2: none    Home Medication Changes: See list below    Med list     Medication List        START taking these medications      aspirin 81 MG Chew  Chew 1 tablet (81 mg total) by mouth daily.     methocarbamol 500 MG Tabs  Commonly known as: Robaxin  Take 1 tablet (500 mg total) by mouth 3 (three) times daily as needed.     Naloxone HCl 4 MG/0.1ML Liqd  4 mg by Nasal route as needed. If patient remains unresponsive, repeat dose in other nostril 2-5 minutes after first dose.     oxyCODONE 5 MG Tabs  Take 1 tablet (5 mg total) by mouth  every 4 (four) hours as needed.            CHANGE how you take these medications      acetaminophen 500 MG Tabs  Commonly known as: Tylenol Extra Strength  Take 1 tablet (500 mg total) by mouth every 4 (four) hours as needed.  What changed:   medication strength  how much to take            CONTINUE taking these medications      atorvastatin 10 MG Tabs  Commonly known as: Lipitor  TAKE 1 TABLET(10 MG) BY MOUTH EVERY DAY     cetirizine 10 MG Tabs  Commonly known as: ZyrTEC     cholecalciferol 50 MCG (2000 UT) Caps  Generic drug: Cholecalciferol     losartan 100 MG Tabs  Commonly known as: Cozaar     montelukast 10 MG Tabs  Commonly known as: Singulair  Take 1 tablet (10 mg total) by mouth nightly.     Omeprazole Magnesium 20 MG Tbec  Commonly known as: PRILOSEC OTC     Vitamin B 12 100 MCG Lozg     Vitamin C 500 MG Tabs  Commonly known as: VITAMIN C            STOP taking these medications      Meloxicam 15 MG Tabs               Where to Get Your Medications        These medications were sent to [facility] [place] [address] [phone], [phone]  [address], [facility] [address]      Phone: [phone]   acetaminophen 500 MG Tabs  aspirin 81 MG Chew  methocarbamol 500 MG Tabs  Naloxone HCl 4 MG/0.1ML Liqd  oxyCODONE 5 MG Tabs         FU   Follow-up Information       John Lundberg PA-C. Go on 4/24/2024.    Specialty: Physician Assistant  Why: 1045am  Contact information:  [address] [facility] [address]  [facility] [id number] [phone]               Jhonathan Neal MD Follow up in 2 week(s).    Specialty: Family Medicine  Contact information:  [address]  [address]  [facility] [address] [phone]                             DC instructions:      Other Discharge Instructions:         Dr. King's Post-operative Instructions  Posterior Cervical Fusion    Refer to this sheet in the next few weeks. These instructions provide you with information on caring for yourself after your  procedure. Your treatment has been planned according to current medical practices, but problems sometimes occur. Call your health care provider if you have any problems or questions after your procedure: [phone].          -This surgery was done to stabilize a fracture.    WHAT TO EXPECT AFTER THE PROCEDURE  After your procedure, it is typical to have the following symptoms:    Pain in the back of your neck   Numbness   Weakness     -Following surgery, these post-operative symptoms will improve with time.    HOME CARE INSTRUCTIONS  It can take 6-12 months to recover fully from this procedure. Make sure to follow all of your health care provider's instructions carefully.    Only take medicines as directed by your health care provider.  You will receive narcotic medication for pain, try to use this only as needed and begin to cut back as your pain improves.   You will receive muscle relaxant medication (Robaxin (methocarbamol) or Tizanidine (zanaflex)); Take this medication scheduled three times per day.   You will also receive two stool softeners to take as needed for constipation.   You can eat what you usually do. Occasionally patients experience difficulty swallowing after this procedure. If you experience this, try soft foods and smoothies for a few days.   Your activities will be limited for the first 3-4 weeks. In general:   ? It is fine to walk and climb stairs as much as tolerated.  ? Do not lift anything weighing more than 10 lb (4.5 kg).  ? Do not lift objects over your head.  ? Do not drive until your health care provider says it is okay, at least 7-10 days.     Take showers instead of baths until directed by your health care provider. Ok to let water run over incision but do not scrub over incision or apply ointments or creams over incision.   You do not require a bandage, the incision will be covered with dermabond (skin glue).   The sutures are beneath the skin and dissolvable. You will not have to  have them removed.   You may apply ice to the repaired area:    ? Put ice in a plastic bag.  ? Place a towel between your skin and the bag.  ? Leave the ice on for 20 minutes, 2-3 times a day.     You will return for follow-up appointment approximately two weeks post-op.    Medication  No NSAIDS until okay with surgeon.  NSAIDS (non-steroidal anti-inflammatory) include: Aspirin, Motrin, ibuprofen, Advil, Aleve, Naprosyn, Indocin, Nuprin, Vioxx, Celebrex, Bextra. (COMPLETE LIST IN GUIDEBOOK)  Tylenol (acetaminophen) is okay. Caution if your pain med contains Tylenol (acetaminophen); maximum 3 gms of Tylenol (acetaminophen) in 24 hours.  Take muscle relaxants and pain medications as prescribed allowing 30-45 minutes to take effect.  Do NOT drink alcohol while on pain medication.  Monitor need for pain medication refills closely.  Call pharmacy or physician office at least five days before out of pain medication. If calling physician office after 3 pm, it will be handled on the next business day.    SEEK MEDICAL CARE IF:   You have redness, swelling, or pain in your cut (incision) that is getting worse.   You have pus coming from the incision.   You have a fever.   There is a bad smell coming from the incision or bandage.   The edges of the incision break open after sutures or staples are taken out.   Your pain medicine is not helping.   You have swelling in your calf or leg.   You seem to be getting worse instead of better.     You develop shortness of breath or chest pain.   You have trouble swallowing.   You have pain, numbness, or weakness that is getting worse.    This information is not intended to replace advice given to you by your health care provider. Make sure you discuss any questions you have with your health care provider.    Document Released: 12/18/2006 Document Revised: 10/23/2017 Document Reviewed: 10/23/2017  [employer] Patient Education © 2018 [employer]          I reconciled current  and discharge medications on day of discharge, discussed changes with patient and noted changes above.       Total Time Coordinating Care: 35 minutes    Patient had opportunity to ask questions and state understand and agree with therapeutic plan as outlined    Thank You,    Sundar Caraballo DO   Hospitalist with [facility]

## 2024-04-11 NOTE — PROGRESS NOTES
INPATIENT PROGRESS NOTE    Assessment:   Miranda Oliveros in room 238/238-A, is a 68 year old female, Hospital Day ( LOS: 2 days ) hospitalized for the below surgical procedure with Dr. King  Post-Op Day 2 Days Post-Op s/p Procedure(s):  Cervcal 1 to cervical 2 posterior fixation and fusion  POSTERIOR CERVICAL LAMINECTOMY FORAMINOTOMY 1 LEV    The patient's other hospital problems include:   Active Hospital Problems    S/P cervical spinal fusion        Plan:     1.  X-ray cervical spine pending  2.  PT/OT  3.  Medical management per hospitalist  4.  Continue neurochecks  5.  Sunflower J collar when up and ambulating and working with physical therapy.  Okay to remove when sitting and laying in a chair or bed.  6.  81 mg aspirin daily for at least 3 months, followed by CTA to determine further need for aspirin  7.  Will coordinate to get patient a bone growth stimulator with [employer] for home use  8.  Okay for discharge from a neurosurgical standpoint so long as x-ray cervical spine is completed, patient is medically cleared, she feels safe for discharge home and her pain is well-controlled  9.  Discussed with Dr. King    The patient was seen and examined with Dr. King, I am acting as scribe.  Patient and family agree to the plan, verbalized understanding were very appreciative.    Subjective:   The patient endorses no new neurologic complaints.  Incisional discomfort, to be expected postoperatively.  She would like to go home today.    Objective:   I&O: I/O last 3 completed shifts:  In: 3031 [P.O.:1480; I.V.:1451; IV PIGGYBACK:100]  Out: 2156 [Urine:2156]     VITALS: /88 (BP Location: Left arm)   Pulse 96   Temp 98.3 °F (36.8 °C) (Temporal)   Resp 15   Ht 63\"   Wt 176 lb 5.9 oz (80 kg)   LMP  (LMP Unknown)   SpO2 95%   BMI 31.24 kg/m²  Temp (24hrs), Av.9 °F (36.6 °C), Min:96.8 °F (36 °C), Max:98.6 °F (37 °C)     Clinical exam:  The patient is awake, alert and orientated.  Speech fluent.   Comprehension intact.  Answer questions appropriately.  He is without commands.  Breathing easy and even.  Skin warm and dry.  Sitting comfortably in the chair eating breakfast.  Cuming J collar at chair side.  Moving bilateral upper and lower extremities spontaneously to full resistance    Imaging reviewed:  X-ray cervical spine pending    Ju Samaniego M.S., PA-C  [address], [address] [address]  [phone]  4/11/2024 8:57 AM    Dragon speech recognition software was used to prepare this note. If a word or phrase is confusing, it is likely due to a failure of recognition. Please contact me with any questions or clarifications.    Is this a shared or split note between Advanced Practice Provider and Physician? Yes

## 2024-04-11 NOTE — PHYSICAL THERAPY NOTE
PHYSICAL THERAPY TREATMENT NOTE - INPATIENT     Room Number: 238/238-A       Presenting Problem:  (C1 fx s/p posteriro fusion)       Problem List  Active Problems:    S/P cervical spinal fusion      PHYSICAL THERAPY ASSESSMENT   Patient demonstrates good progress this session.    Patient continues to function near baseline with gait and stairs.  Contributing factors to remaining limitations include  balance .  Next session anticipate patient to progress stair negotiation.  Physical Therapy will continue to follow patient for duration of hospitalization.    Patient continues to benefit from continued skilled PT services: at discharge to promote functional independence and safety with spouse for additional support and return home.    PLAN  PT Treatment Plan: Bed mobility;Patient education;Family education;Gait training;Transfer training  Frequency (Obs): Daily    SUBJECTIVE  \"I am not dizzy today\"    OBJECTIVE  Precautions: Cervical brace;Spine (Kenaitze J)    WEIGHT BEARING RESTRICTION                PAIN ASSESSMENT   Ratin          BALANCE  Static Sitting: Fair  Dynamic Sitting: Fair  Static Standing: Fair -  Dynamic Standing: Fair -    ACTIVITY TOLERANCE                          O2 WALK       AM-PAC '6-Clicks' INPATIENT SHORT FORM - BASIC MOBILITY  How much difficulty does the patient currently have...  Patient Difficulty: Turning over in bed (including adjusting bedclothes, sheets and blankets)?: A Little   Patient Difficulty: Sitting down on and standing up from a chair with arms (e.g., wheelchair, bedside commode, etc.): A Little   Patient Difficulty: Moving from lying on back to sitting on the side of the bed?: A Little   How much help from another person does the patient currently need...   Help from Another: Moving to and from a bed to a chair (including a wheelchair)?: A Little   Help from Another: Need to walk in hospital room?: A Little   Help from Another: Climbing 3-5 steps with a railing?: A Little      AM-PAC Score:  Raw Score: 18   Approx Degree of Impairment: 46.58%   Standardized Score (AM-PAC Scale): 43.63   CMS Modifier (G-Code): CK    FUNCTIONAL ABILITY STATUS  Functional Mobility/Gait Assessment  Gait Assistance: Modified independent  Distance (ft): 320  Assistive Device: Rolling walker  Stairs: Stairs  How Many Stairs: 13  Device: 1 Rail  Assist: Modified independent  Pattern: Ascend and Descend  Ascend and Descend : Step to  Rolling: stand-by assist  Supine to Sit: stand-by assist  Sit to Supine: stand-by assist  Sit to Stand: stand-by assist      The patient's Approx Degree of Impairment: 46.58% has been calculated based on documentation in the [facility] '6 clicks' Inpatient Daily Activity Short Form.  Research supports that patients with this level of impairment may benefit from HH.  Final disposition will be made by interdisciplinary medical team.        Patient End of Session: Up in chair    CURRENT GOALS   Goals to be met by: 4/24  Patient Goal Patient's self-stated goal is: to go home   Goal #1 Patient is able to demonstrate supine - sit EOB @ level: supervision     Goal #1   Current Status Goal met   Goal #2 Patient is able to demonstrate transfers Sit to/from Stand at assistance level: supervision with walker - rolling     Goal #2  Current Status Goal met   Goal #3 Patient is able to ambulate 300 feet with assist device: walker - rolling at assistance level: supervision   Goal #3   Current Status Goal met   Goal #4 Patient will negotiate 3 stairs/one curb w/ assistive device and supervision   Goal #4   Current Status Goal met   Goal #5 Patient to demonstrate independence with home activity/exercise instructions provided to patient in preparation for discharge.   Goal #5   Current Status    Goal #6    Goal #6  Current Status      Gait Training: 15 minutes  Therapeutic Activity: 10 minutes

## 2024-04-12 NOTE — PAYOR COMM NOTE
--------------  DISCHARGE REVIEW    Payor: [facility]  Subscriber #:  [id number]  Authorization Number: [id number]    Admit date: 4/9/24  Admit time:   8:46 AM  Discharge Date: 4/11/2024  1:16 PM     Admitting Physician: Bartolo King MD  Attending Physician:  No att. providers found  Primary Care Physician: Jhonathan Neal MD          Discharge Summary Notes        Discharge Summary signed by Sundar Caraballo DO at 4/11/2024  3:21 PM       Author: Sundar Caraballo DO Specialty: HOSPITALIST Author Type: Physician    Filed: 4/11/2024  3:21 PM Date of Service: 4/11/2024  3:19 PM Status: Signed    : Sundar Caraballo DO (Physician)           General Medicine Discharge Summary     Patient ID:  Miranda Oliveros  68 year old  8/16/1955    Admit date: 4/9/2024    Discharge date and time: 4/11/2024  1:16 PM     Attending Physician: Sundar Caraballo DO     Consults: IP CONSULT TO PULMONOLOGY  IP CONSULT TO CASE MANAGEMENT    Primary Care Physician: Jhonathan Neal MD     Reason for admission: Elective surgical surgery    Risk For Readmission: Low    Discharge Diagnoses: Closed displaced fracture of first cervical vertebra, unspecified fracture morphology, initial encounter ([facility]) [S12.000A]  S/P cervical spinal fusion  See Additional Discharge Diagnoses in Hospital Course    Discharged Condition: good    Follow-up with labs/images appointments:   Close follow-up with primary care recommended within 2 weeks  -Follow-up with surgery recommended    Exam  Gen: No acute distress  Pulm: Lungs clear, normal respiratory effort  CV: Heart with regular rate and rhythm  Abd: Abdomen soft,   EXT: no edema     HPI:   Per Dr. Bolivar:      Ms. Oliveros is a 67 yo F with PMH Of HTN, HL GERD who presented for cervical surgery. Patient seen post op, having some neck pain. No CP, SOB, N/V.        Hospital Course:   She was admitted for observation after elective cervical surgery, did well after procedure no complications, discharged home with  close follow-up as previously scheduled    Operative Procedures: Procedure(s) (LRB):  Cervcal 1 to cervical 2 posterior fixation and fusion (Bilateral)  POSTERIOR CERVICAL LAMINECTOMY FORAMINOTOMY 1 LEV (N/A)     Imaging: XR CERVICAL SPINE (2-3 VIEWS) (CPT=72040)    Result Date: 4/11/2024  CONCLUSION:  1. Posterior fusion hardware at C1-C2 grossly intact.    Dictated by (CST): Alex Seaman MD on 4/11/2024 at 10:55 AM     Finalized by (CST): Alex Seaman MD on 4/11/2024 at 10:58 AM          CTA BRAIN + CTA CAROTIDS (CPT=70496/42437)    Result Date: 4/9/2024  CONCLUSION:  1. Limited assessment of the junction of the V2/V3 segments both vertebral arteries at the C2 level due to streak artifact from the adjacent surgical hardware.  However, there is a suspected focally severe stenosis of the left vertebral artery at C2 where the left pedicular screw appears to course through the superior aspect of the transverse foramen adjacent to the vessel.  The remainder of the left vertebral artery is patent. 2. No flow limiting stenosis or occlusion of the internal carotid arteries or major intracranial arteries. 3. Postoperative changes from a recent posterior instrumented fusion at C1-C2 due to a known displaced comminuted burst fracture of C1. 4. No acute intracranial process by noncontrast CT technique. 5. Stable mild generalized atrophy and mild chronic microangiopathic ischemic changes.   Results of this examination were discussed with the patient's physician, Dr. Bartolo King, by Dr. Salomon at 22:40 on 04/09/2024.  Dictated by (CST): Coy Salomon MD on 4/09/2024 at 10:10 PM     Finalized by (CST): Coy Salomon MD on 4/09/2024 at 10:41 PM          XR FLUOROSCOPY C-ARM TIME LESS THAN 1 HOUR (CPT=76000)    Result Date: 4/9/2024  CONCLUSION: See above.   Dictated by (CST): Edilberto Christensen MD on 4/09/2024 at 3:45 PM     Finalized by (CST): Edilberto Christensen MD on 4/09/2024 at 3:47 PM           Disposition:  home    Activity: activity as tolerated  Diet: regular diet  Wound Care: none needed  Code Status: Prior  O2: none    Home Medication Changes: See list below    Med list     Medication List        START taking these medications      aspirin 81 MG Chew  Chew 1 tablet (81 mg total) by mouth daily.     methocarbamol 500 MG Tabs  Commonly known as: Robaxin  Take 1 tablet (500 mg total) by mouth 3 (three) times daily as needed.     Naloxone HCl 4 MG/0.1ML Liqd  4 mg by Nasal route as needed. If patient remains unresponsive, repeat dose in other nostril 2-5 minutes after first dose.     oxyCODONE 5 MG Tabs  Take 1 tablet (5 mg total) by mouth every 4 (four) hours as needed.            CHANGE how you take these medications      acetaminophen 500 MG Tabs  Commonly known as: Tylenol Extra Strength  Take 1 tablet (500 mg total) by mouth every 4 (four) hours as needed.  What changed:   medication strength  how much to take            CONTINUE taking these medications      atorvastatin 10 MG Tabs  Commonly known as: Lipitor  TAKE 1 TABLET(10 MG) BY MOUTH EVERY DAY     cetirizine 10 MG Tabs  Commonly known as: ZyrTEC     cholecalciferol 50 MCG (2000 UT) Caps  Generic drug: Cholecalciferol     losartan 100 MG Tabs  Commonly known as: Cozaar     montelukast 10 MG Tabs  Commonly known as: Singulair  Take 1 tablet (10 mg total) by mouth nightly.     Omeprazole Magnesium 20 MG Tbec  Commonly known as: PRILOSEC OTC     Vitamin B 12 100 MCG Lozg     Vitamin C 500 MG Tabs  Commonly known as: VITAMIN C            STOP taking these medications      Meloxicam 15 MG Tabs               Where to Get Your Medications        These medications were sent to [facility] - [place], [place] - [address] [phone], [phone]  [address], [facility] [address]      Phone: [phone]   acetaminophen 500 MG Tabs  aspirin 81 MG Chew  methocarbamol 500 MG Tabs  Naloxone HCl 4 MG/0.1ML Liqd  oxyCODONE 5 MG Tabs         FU    Follow-up Information       John Lundberg PA-C. Go on 4/24/2024.    Specialty: Physician Assistant  Why: 1045am  Contact information:  [address] [facility] [address]  [facility] [address]  [phone]               Jhonathan Neal MD Follow up in 2 week(s).    Specialty: Family Medicine  Contact information:  [address]   [address]  [facility] [address]  [phone]                             DC instructions:      Other Discharge Instructions:         Dr. King's Post-operative Instructions  Posterior Cervical Fusion    Refer to this sheet in the next few weeks. These instructions provide you with information on caring for yourself after your procedure. Your treatment has been planned according to current medical practices, but problems sometimes occur. Call your health care provider if you have any problems or questions after your procedure: [phone].          -This surgery was done to stabilize a fracture.    WHAT TO EXPECT AFTER THE PROCEDURE  After your procedure, it is typical to have the following symptoms:    Pain in the back of your neck   Numbness   Weakness     -Following surgery, these post-operative symptoms will improve with time.    HOME CARE INSTRUCTIONS  It can take 6-12 months to recover fully from this procedure. Make sure to follow all of your health care provider's instructions carefully.    Only take medicines as directed by your health care provider.  You will receive narcotic medication for pain, try to use this only as needed and begin to cut back as your pain improves.   You will receive muscle relaxant medication (Robaxin (methocarbamol) or Tizanidine (zanaflex)); Take this medication scheduled three times per day.   You will also receive two stool softeners to take as needed for constipation.   You can eat what you usually do. Occasionally patients experience difficulty swallowing after this procedure. If you experience this, try soft foods and smoothies for a few days.   Your activities  will be limited for the first 3-4 weeks. In general:   ? It is fine to walk and climb stairs as much as tolerated.  ? Do not lift anything weighing more than 10 lb (4.5 kg).  ? Do not lift objects over your head.  ? Do not drive until your health care provider says it is okay, at least 7-10 days.     Take showers instead of baths until directed by your health care provider. Ok to let water run over incision but do not scrub over incision or apply ointments or creams over incision.   You do not require a bandage, the incision will be covered with dermabond (skin glue).   The sutures are beneath the skin and dissolvable. You will not have to have them removed.   You may apply ice to the repaired area:    ? Put ice in a plastic bag.  ? Place a towel between your skin and the bag.  ? Leave the ice on for 20 minutes, 2-3 times a day.     You will return for follow-up appointment approximately two weeks post-op.    Medication  No NSAIDS until okay with surgeon.  NSAIDS (non-steroidal anti-inflammatory) include: Aspirin, Motrin, ibuprofen, Advil, Aleve, Naprosyn, Indocin, Nuprin, Vioxx, Celebrex, Bextra. (COMPLETE LIST IN GUIDEBOOK)  Tylenol (acetaminophen) is okay. Caution if your pain med contains Tylenol (acetaminophen); maximum 3 gms of Tylenol (acetaminophen) in 24 hours.  Take muscle relaxants and pain medications as prescribed allowing 30-45 minutes to take effect.  Do NOT drink alcohol while on pain medication.  Monitor need for pain medication refills closely.  Call pharmacy or physician office at least five days before out of pain medication. If calling physician office after 3 pm, it will be handled on the next business day.    SEEK MEDICAL CARE IF:   You have redness, swelling, or pain in your cut (incision) that is getting worse.   You have pus coming from the incision.   You have a fever.   There is a bad smell coming from the incision or bandage.   The edges of the incision break open after sutures or  staples are taken out.   Your pain medicine is not helping.   You have swelling in your calf or leg.   You seem to be getting worse instead of better.     You develop shortness of breath or chest pain.   You have trouble swallowing.   You have pain, numbness, or weakness that is getting worse.    This information is not intended to replace advice given to you by your health care provider. Make sure you discuss any questions you have with your health care provider.    Document Released: 12/18/2006 Document Revised: 10/23/2017 Document Reviewed: 10/23/2017  [employer] Interactive Patient Education © 2018 [employer]          I reconciled current and discharge medications on day of discharge, discussed changes with patient and noted changes above.       Total Time Coordinating Care: 35 minutes    Patient had opportunity to ask questions and state understand and agree with therapeutic plan as outlined    Thank You,    Sundar Caraballo DO   Hospitalist with [facility]         Electronically signed by Sundar Caraballo DO on 4/11/2024  3:21 PM         REVIEWER COMMENTS     Baptism

## 2024-04-24 ENCOUNTER — OFFICE VISIT (OUTPATIENT)
Dept: SURGERY | Facility: CLINIC | Age: 69
End: 2024-04-24
Payer: COMMERCIAL

## 2024-04-24 VITALS
WEIGHT: 175 LBS | BODY MASS INDEX: 31.01 KG/M2 | HEIGHT: 63 IN | DIASTOLIC BLOOD PRESSURE: 82 MMHG | HEART RATE: 98 BPM | SYSTOLIC BLOOD PRESSURE: 144 MMHG

## 2024-04-24 DIAGNOSIS — M43.6 NECK STIFFNESS: Primary | ICD-10-CM

## 2024-04-24 DIAGNOSIS — Z98.1 S/P CERVICAL SPINAL FUSION: ICD-10-CM

## 2024-04-24 DIAGNOSIS — S12.000A CLOSED DISPLACED FRACTURE OF FIRST CERVICAL VERTEBRA, UNSPECIFIED FRACTURE MORPHOLOGY, INITIAL ENCOUNTER (HCC): ICD-10-CM

## 2024-04-24 PROCEDURE — 3077F SYST BP >= 140 MM HG: CPT | Performed by: STUDENT IN AN ORGANIZED HEALTH CARE EDUCATION/TRAINING PROGRAM

## 2024-04-24 PROCEDURE — 3079F DIAST BP 80-89 MM HG: CPT | Performed by: STUDENT IN AN ORGANIZED HEALTH CARE EDUCATION/TRAINING PROGRAM

## 2024-04-24 PROCEDURE — 3008F BODY MASS INDEX DOCD: CPT | Performed by: STUDENT IN AN ORGANIZED HEALTH CARE EDUCATION/TRAINING PROGRAM

## 2024-04-24 PROCEDURE — 99024 POSTOP FOLLOW-UP VISIT: CPT | Performed by: STUDENT IN AN ORGANIZED HEALTH CARE EDUCATION/TRAINING PROGRAM

## 2024-04-24 PROCEDURE — 1111F DSCHRG MED/CURRENT MED MERGE: CPT | Performed by: STUDENT IN AN ORGANIZED HEALTH CARE EDUCATION/TRAINING PROGRAM

## 2024-04-24 RX ORDER — METHOCARBAMOL 500 MG/1
500 TABLET, FILM COATED ORAL 3 TIMES DAILY PRN
Qty: 60 TABLET | Refills: 0 | Status: SHIPPED | OUTPATIENT
Start: 2024-04-24

## 2024-04-24 NOTE — PROGRESS NOTES
Established Neurosurgery Patient    Patient: Miranda Oliveros  Medical Record Number: [id number]  YOB: 1955  PCP: Jhonathan Neal MD    Reason for visit: 2-week postoperative visit    HISTORY OF PRESENTING ILLNESS:  Miranda Oliveros is a pleasant 68 year old female who returns to the neurosurgery clinic today approximately 2 weeks s/p C1-2 fusion with Dr. King on 2024.  The patient reports that she has been doing well since surgery.  She has some neck and shoulder stiffness, but denies any pain.  No radicular pain, numbness, tingling, or weakness.  No new neurologic complaints.  No constitutional symptoms or symptoms concerning for sepsis.  Denies any issues with her incision site, other than some mild pruritus.  She reports compliance with her [place] J collar at all times when up and ambulatory.  She removes the collar when she is sitting or laying at home.  She was able to wean herself off of the narcotic medications.  She takes Robaxin and Tylenol as needed for discomfort relief.  She has also been compliant with her bone growth stimulator.    Past Medical History:    Allergic rhinitis due to pollen    Carcinoma in situ of cervix uteri    Cataract    Esophageal reflux    High blood pressure    High cholesterol    Hyperlipidemia    Menopause    Osteoarthritis    generalized    Pure hypercholesterolemia    Rosacea    Tubular adenoma of colon    Visual impairment    glasses      Past Surgical History:   Procedure Laterality Date              Colonoscopy N/A 2021    Procedure: COLONOSCOPY with polypectomy;  Surgeon: Sivakumar Benson MD;  Location: [facility]    Colonoscopy,biopsy  2012    Procedure: COLONOSCOPY, POSSIBLE BIOPSY, POSSIBLE POLYPECTOMY 30190;  Surgeon: Sivakumar Benson MD;  Location: [facility]    Colonoscopy,diagnostic N/A 2016    Procedure: COLONOSCOPY, POSSIBLE BIOPSY, POSSIBLE POLYPECTOMY 26497;  Surgeon:  Sivakumar Benson MD;  Location: [facility]    Ct heart w/ calcium scoring  2/16/2009    calcium score of 0    Ct heart w/ calcium scoring N/A 08/08/2019    calcium score is zero    Impact tooth rem bony w/comp Bilateral 1973    wisdom teeth    Knee replacement surgery Right 02/21/2017    right    Laser surgery of cervix  1/1986    Peripheral vascular screening historical conv Bilateral 8/1/2016    PAD screen is normal    Total knee replacement Right 02/22/2017      Family History   Problem Relation Age of Onset    Diabetes Father     Heart Disease Father     Heart Surgery Father 55        CABGx4    Ear Problems Father         hearing loss    Heart Disorder Father     Hypertension Father     Cancer Mother 74        colon    Cataracts Mother     Hypertension Mother     Eye Problems Maternal Grandmother         macular degeneration    High Cholesterol Maternal Grandmother     Musculo-skelatal Disorder Maternal Grandmother         osteoporosis    Heart Disease Maternal Grandmother         CHf    Pulmonary Disease Maternal Grandmother         COPD    Lipids Maternal Grandmother     Alcohol and Other Disorders Associated Maternal Grandfather     Dementia Paternal Grandmother         Alzheimers    Neurological Disorder Paternal Grandmother         Parkinson's    Diabetes Paternal Grandfather     Heart Disorder Paternal Grandfather         CHF    Cancer Paternal Grandfather     High Cholesterol Sister     Hypertension Sister     Diabetes Sister     Clotting Disorder Sister         DVT    Obesity Sister     Kidney Disease Sister         stones    Arthritis Sister         TKA    Breast Cancer Sister 56        56    Other (Other) Sister         PMR    Breast Cancer Maternal Great-Grandmother       Social History     Socioeconomic History    Marital status:      Spouse name: Humberto    Number of children: 3    Years of education: 14   Occupational History    Occupation: Bank Asst Manager     Employer:  [employer]   Tobacco Use    Smoking status: Former     Current packs/day: 0.00     Average packs/day: 1 pack/day for 6.0 years (6.0 ttl pk-yrs)     Types: Cigarettes     Start date: 1977     Quit date: 1983     Years since quittin.3    Smokeless tobacco: Never   Vaping Use    Vaping status: Never Used   Substance and Sexual Activity    Alcohol use: Yes     Alcohol/week: 7.0 standard drinks of alcohol     Types: 7 Standard drinks or equivalent per week     Comment: 2 hard seltzers daily    Drug use: No    Sexual activity: Yes     Partners: Male   Other Topics Concern     Service No    Blood Transfusions No    Caffeine Concern No    Occupational Exposure No    Hobby Hazards No    Sleep Concern No    Stress Concern No    Weight Concern No    Special Diet No    Back Care No    Exercise No    Bike Helmet No    Seat Belt No    Self-Exams Yes      Allergies   Allergen Reactions    Amoxicillin-Pot Clavulanate RASH     No peeling/ blisters  No organ damage      Current Medications:  Current Outpatient Medications   Medication Sig Dispense Refill    methocarbamol 500 MG Oral Tab Take 1 tablet (500 mg total) by mouth 3 (three) times daily as needed. 60 tablet 0    aspirin 81 MG Oral Chew Tab Chew 1 tablet (81 mg total) by mouth daily. 90 tablet 1    oxyCODONE 5 MG Oral Tab Take 1 tablet (5 mg total) by mouth every 4 (four) hours as needed. 30 tablet 0    acetaminophen 500 MG Oral Tab Take 1 tablet (500 mg total) by mouth every 4 (four) hours as needed. 120 tablet 0    Naloxone HCl 4 MG/0.1ML Nasal Liquid 4 mg by Nasal route as needed. If patient remains unresponsive, repeat dose in other nostril 2-5 minutes after first dose. 1 kit 0    losartan 100 MG Oral Tab       Vitamin C 500 MG Oral Tab Take 1 tablet (500 mg total) by mouth daily.      montelukast 10 MG Oral Tab Take 1 tablet (10 mg total) by mouth nightly. 90 tablet 3    atorvastatin 10 MG Oral Tab TAKE 1 TABLET(10 MG) BY MOUTH EVERY DAY 90  tablet 3    Cyanocobalamin (VITAMIN B 12) 100 MCG Oral Lozenge Take 1,000 mcg by mouth daily.   30 lozenge 0    Vitamin D3 2000 units Oral Cap Take 2 capsules (4,000 Units total) by mouth daily. 180 capsule 3    cetirizine 10 MG Oral Tab Take 1 tablet (10 mg total) by mouth daily.      Omeprazole Magnesium 20 MG Oral Tab EC Take 1 tablet (20 mg total) by mouth every morning. 28 tablet 0        REVIEW OF SYSTEMS:  Comprehensive review of systems completed and negative with the exception of aforementioned information in the HPI.     PHYSICAL EXAM:  /82 (BP Location: Right arm, Patient Position: Sitting, Cuff Size: adult)   Pulse 98   Ht 63\"   Wt 175 lb (79.4 kg)   LMP  (LMP Unknown)   BMI 31.00 kg/m²   Body mass index is 31 kg/m².  Wt Readings from Last 6 Encounters:   04/24/24 175 lb (79.4 kg)   04/11/24 176 lb 5.9 oz (80 kg)   04/04/24 177 lb (80.3 kg)   02/15/24 180 lb (81.6 kg)   11/14/23 180 lb (81.6 kg)   09/26/23 180 lb (81.6 kg)        General: Well developed, well nourished, in no acute distress. Ambulates without assistance.    Incision: Mild erythema, but no warmth, swelling, or gross drainage.  Nylon sutures intact.    HEENT: Normocephalic, atraumatic.    Respirations: Non-labored     Neurologic / Musculoskeletal: Awake, alert, and interactive. Recent and remote memory appear intact. Attention span and concentration are appropriate. No dysarthria. Appropriately names objects. Coordination and motor control grossly intact. Patient follows commands briskly and appropriately.  Face symmetric.  Pupils equal and round. EOMs intact.  No drift.    Cervical Spine:    Motor/ Extremities   Deltoid Biceps Triceps    Sensation   R 5/5 5/5 5/5 5/5 Intact to light touch   L 5/5 5/5 5/5 5/5 Intact to light touch       Lumbar Spine:    Motor / Extremities   Hip   flexion Knee   extension Dorsiflexion Plantarflexion   Sensation   R 5/5 5/5 5/5 5/5 Intact to light touch   L 5/5 5/5 5/5 5/5 Intact to light  touch       IMAGING:  Postoperative x-ray cervical spine reviewed in detail with the patient and her  today.    ASSESSMENT / PLAN:    ICD-10-CM   1. Neck stiffness  M43.6      2. S/P cervical spinal fusion  Z98.1          3. Closed displaced fracture of first cervical vertebra, unspecified fracture morphology, initial encounter ([facility])  S12.000A              Miranda Oliveros returns to the clinic today approximately 2 weeks s/p C1-C2 fusion on 4/9/2024 with Dr. King.  Patient has progressed well since surgery.  She has no overt neck pain, but describes some neck stiffness, which is to be expected not only with her surgery, but also the frequent collar use.  She finds good relief with Robaxin and Tylenol and needs a Robaxin refilled today.  She is neurologically stable on exam.  No signs/symptoms concerning for infection, locally or systemically.  Nylon sutures were removed in the office today without complication.  Patient tolerated the removal well.    -Medications Prescribed: Robaxin refill.  Continue 81 mg aspirin daily for at least 3 months and until discontinued by neurosurgery  -Imaging Ordered: X-ray cervical spine AP/lateral to be completed a day or 2 prior to next office visit.  Will need a CTA neck at the 3-month postoperative appointment.  -Referrals Placed: None  -Follow up: 5/22/2024 at 0945  -[place] J collar at all times when up and ambulating.  Okay to remove when sitting/lying  -Continue bone growth stimulator use    Plan was reviewed and discussed in detail with the patient. Patient encouraged to call the office with any questions or concerns of new/worsening neurologic symptoms. Patient demonstrated good understanding and was agreeable with the plan.     Visit time: 30 minutes   Over 50% of that time was spent providing patient education and discussing care plan.    John Lundberg PA-C  Physician Assistant- Neurosurgery   [facility]  4/24/2024, 12:33 PM

## 2024-05-22 ENCOUNTER — OFFICE VISIT (OUTPATIENT)
Dept: SURGERY | Facility: CLINIC | Age: 69
End: 2024-05-22

## 2024-05-22 ENCOUNTER — HOSPITAL ENCOUNTER (OUTPATIENT)
Dept: GENERAL RADIOLOGY | Facility: HOSPITAL | Age: 69
Discharge: HOME | End: 2024-05-22
Attending: STUDENT IN AN ORGANIZED HEALTH CARE EDUCATION/TRAINING PROGRAM

## 2024-05-22 VITALS
HEART RATE: 82 BPM | BODY MASS INDEX: 31.01 KG/M2 | DIASTOLIC BLOOD PRESSURE: 94 MMHG | SYSTOLIC BLOOD PRESSURE: 151 MMHG | HEIGHT: 63 IN | WEIGHT: 175 LBS

## 2024-05-22 DIAGNOSIS — Z98.890 POSTOPERATIVE STATE: ICD-10-CM

## 2024-05-22 DIAGNOSIS — S15.109D: ICD-10-CM

## 2024-05-22 DIAGNOSIS — Z48.89 ENCOUNTER FOR POSTOPERATIVE WOUND CHECK: ICD-10-CM

## 2024-05-22 DIAGNOSIS — Z98.1 S/P CERVICAL SPINAL FUSION: ICD-10-CM

## 2024-05-22 DIAGNOSIS — Z98.890 HX OF CERVICAL SPINE SURGERY: ICD-10-CM

## 2024-05-22 DIAGNOSIS — S12.000A CLOSED DISPLACED FRACTURE OF FIRST CERVICAL VERTEBRA, UNSPECIFIED FRACTURE MORPHOLOGY, INITIAL ENCOUNTER (HCC): ICD-10-CM

## 2024-05-22 DIAGNOSIS — Z98.1 S/P CERVICAL SPINAL FUSION: Primary | ICD-10-CM

## 2024-05-22 PROCEDURE — 3008F BODY MASS INDEX DOCD: CPT | Performed by: STUDENT IN AN ORGANIZED HEALTH CARE EDUCATION/TRAINING PROGRAM

## 2024-05-22 PROCEDURE — 72040 X-RAY EXAM NECK SPINE 2-3 VW: CPT | Performed by: STUDENT IN AN ORGANIZED HEALTH CARE EDUCATION/TRAINING PROGRAM

## 2024-05-22 PROCEDURE — 3077F SYST BP >= 140 MM HG: CPT | Performed by: STUDENT IN AN ORGANIZED HEALTH CARE EDUCATION/TRAINING PROGRAM

## 2024-05-22 PROCEDURE — 99024 POSTOP FOLLOW-UP VISIT: CPT | Performed by: STUDENT IN AN ORGANIZED HEALTH CARE EDUCATION/TRAINING PROGRAM

## 2024-05-22 PROCEDURE — 3080F DIAST BP >= 90 MM HG: CPT | Performed by: STUDENT IN AN ORGANIZED HEALTH CARE EDUCATION/TRAINING PROGRAM

## 2024-05-22 RX ORDER — AMLODIPINE BESYLATE 5 MG/1
5 TABLET ORAL DAILY
COMMUNITY

## 2024-05-22 NOTE — PROGRESS NOTES
Established Neurosurgery Patient    Patient: Miranda Oliveros  Medical Record Number: [id number]  YOB: 1955  PCP: Jhonathan Neal MD    Reason for visit: 6-week postoperative visit    HISTORY OF PRESENTING ILLNESS:  Miranda Oliveros is a pleasant 68 year old female who returns to the neurosurgery clinic today approximately 6 weeks s/p C1-2 fusion with Dr. King on 2024.  She continues to do well since surgery.  She is intermittently utilizing Robaxin for neck stiffness, but feels that this is improving.  She has no new neurologic complaints.  No constitutional symptoms or symptoms concerning for sepsis.  No issues with her incision site.  She has continued to use her bone growth stimulator.  She has been utilizing her [place] J collar when up and ambulatory for longer distances.  She does not utilize the collar when traveling within the house, typically, unless she is navigating stairs or doing an activity.    Past Medical History:    Allergic rhinitis due to pollen    Carcinoma in situ of cervix uteri    Cataract    Esophageal reflux    High blood pressure    High cholesterol    Hyperlipidemia    Menopause    Osteoarthritis    generalized    Pure hypercholesterolemia    Rosacea    Tubular adenoma of colon    Visual impairment    glasses      Past Surgical History:   Procedure Laterality Date              Colonoscopy N/A 2021    Procedure: COLONOSCOPY with polypectomy;  Surgeon: Sivakumar Benson MD;  Location: [facility]    Colonoscopy,biopsy  2012    Procedure: COLONOSCOPY, POSSIBLE BIOPSY, POSSIBLE POLYPECTOMY 52863;  Surgeon: Sivakumar Benson MD;  Location: [facility]    Colonoscopy,diagnostic N/A 2016    Procedure: COLONOSCOPY, POSSIBLE BIOPSY, POSSIBLE POLYPECTOMY 45952;  Surgeon: Sivakumar Benson MD;  Location: [facility]    Ct heart w/ calcium scoring  2009    calcium score of 0    Ct heart w/ calcium  Left message to return call scoring N/A 08/08/2019    calcium score is zero    Impact tooth rem bony w/comp Bilateral 1973    wisdom teeth    Knee replacement surgery Right 02/21/2017    right    Laser surgery of cervix  1/1986    Peripheral vascular screening historical conv Bilateral 8/1/2016    PAD screen is normal    Total knee replacement Right 02/22/2017      Family History   Problem Relation Age of Onset    Diabetes Father     Heart Disease Father     Heart Surgery Father 55        CABGx4    Ear Problems Father         hearing loss    Heart Disorder Father     Hypertension Father     Cancer Mother 74        colon    Cataracts Mother     Hypertension Mother     Eye Problems Maternal Grandmother         macular degeneration    High Cholesterol Maternal Grandmother     Musculo-skelatal Disorder Maternal Grandmother         osteoporosis    Heart Disease Maternal Grandmother         CHf    Pulmonary Disease Maternal Grandmother         COPD    Lipids Maternal Grandmother     Alcohol and Other Disorders Associated Maternal Grandfather     Dementia Paternal Grandmother         Alzheimers    Neurological Disorder Paternal Grandmother         Parkinson's    Diabetes Paternal Grandfather     Heart Disorder Paternal Grandfather         CHF    Cancer Paternal Grandfather     High Cholesterol Sister     Hypertension Sister     Diabetes Sister     Clotting Disorder Sister         DVT    Obesity Sister     Kidney Disease Sister         stones    Arthritis Sister         TKA    Breast Cancer Sister 56        56    Other (Other) Sister         PMR    Breast Cancer Maternal Great-Grandmother       Social History     Socioeconomic History    Marital status:      Spouse name: Humberto    Number of children: 3    Years of education: 14   Occupational History    Occupation: Bank Asst Manager     Employer: [employer]   Tobacco Use    Smoking status: Former     Current packs/day: 0.00     Average packs/day: 1 pack/day for 6.0 years (6.0 ttl  pk-yrs)     Types: Cigarettes     Start date: 1977     Quit date: 1983     Years since quittin.4    Smokeless tobacco: Never   Vaping Use    Vaping status: Never Used   Substance and Sexual Activity    Alcohol use: Yes     Alcohol/week: 7.0 standard drinks of alcohol     Types: 7 Standard drinks or equivalent per week     Comment: 2 hard seltzers daily    Drug use: No    Sexual activity: Yes     Partners: Male   Other Topics Concern     Service No    Blood Transfusions No    Caffeine Concern No    Occupational Exposure No    Hobby Hazards No    Sleep Concern No    Stress Concern No    Weight Concern No    Special Diet No    Back Care No    Exercise No    Bike Helmet No    Seat Belt No    Self-Exams Yes      Allergies   Allergen Reactions    Amoxicillin-Pot Clavulanate RASH     No peeling/ blisters  No organ damage      Current Medications:  Current Outpatient Medications   Medication Sig Dispense Refill    methocarbamol 500 MG Oral Tab Take 1 tablet (500 mg total) by mouth 3 (three) times daily as needed. 60 tablet 0    aspirin 81 MG Oral Chew Tab Chew 1 tablet (81 mg total) by mouth daily. 90 tablet 1    oxyCODONE 5 MG Oral Tab Take 1 tablet (5 mg total) by mouth every 4 (four) hours as needed. 30 tablet 0    acetaminophen 500 MG Oral Tab Take 1 tablet (500 mg total) by mouth every 4 (four) hours as needed. 120 tablet 0    Naloxone HCl 4 MG/0.1ML Nasal Liquid 4 mg by Nasal route as needed. If patient remains unresponsive, repeat dose in other nostril 2-5 minutes after first dose. 1 kit 0    losartan 100 MG Oral Tab       Vitamin C 500 MG Oral Tab Take 1 tablet (500 mg total) by mouth daily.      montelukast 10 MG Oral Tab Take 1 tablet (10 mg total) by mouth nightly. 90 tablet 3    atorvastatin 10 MG Oral Tab TAKE 1 TABLET(10 MG) BY MOUTH EVERY DAY 90 tablet 3    Cyanocobalamin (VITAMIN B 12) 100 MCG Oral Lozenge Take 1,000 mcg by mouth daily.   30 lozenge 0    Vitamin D3 2000 units Oral Cap  Take 2 capsules (4,000 Units total) by mouth daily. 180 capsule 3    cetirizine 10 MG Oral Tab Take 1 tablet (10 mg total) by mouth daily.      Omeprazole Magnesium 20 MG Oral Tab EC Take 1 tablet (20 mg total) by mouth every morning. 28 tablet 0        REVIEW OF SYSTEMS:  Comprehensive review of systems completed and negative with the exception of aforementioned information in the HPI.     PHYSICAL EXAM:  BP (!) 151/94 (BP Location: Right arm, Patient Position: Sitting, Cuff Size: adult)   Pulse 82   Ht 63\"   Wt 175 lb (79.4 kg)   LMP  (LMP Unknown)   BMI 31.00 kg/m²   Body mass index is 31 kg/m².  Wt Readings from Last 6 Encounters:   05/22/24 175 lb (79.4 kg)   04/24/24 175 lb (79.4 kg)   04/11/24 176 lb 5.9 oz (80 kg)   04/04/24 177 lb (80.3 kg)   02/15/24 180 lb (81.6 kg)   11/14/23 180 lb (81.6 kg)        General: Well developed, well nourished, in no acute distress. Ambulates without assistance.  [place] J collar appropriately fitted.    Incision: Healing well.  No erythema, warmth, swelling, or gross drainage appreciated.  Open air.    HEENT: Normocephalic, atraumatic.    Respirations: Non-labored     Neurologic / Musculoskeletal: Awake, alert, and interactive. Recent and remote memory appear intact. Attention span and concentration are appropriate. No dysarthria. Appropriately names objects. Coordination and motor control grossly intact. Patient follows commands briskly and appropriately.  No facial asymmetry.      Cervical Spine:    Motor/ Extremities   Deltoid Biceps Triceps    Sensation   R 5/5 5/5 5/5 5/5 Intact to light touch   L 5/5 5/5 5/5 5/5 Intact to light touch       Lumbar Spine:    Motor / Extremities   Hip   flexion Knee   extension Dorsiflexion EHL Plantarflexion   Sensation   R 5/5 5/5 5/5 5/5 5/5 Intact to light touch   L 5/5 5/5 5/5 5/5 5/5 Intact to light touch       IMAGING:  XR CERVICAL SPINE (2-3 VIEWS) (CPT=72040)    Result Date: 5/22/2024  CONCLUSION:  1. Posterior cervical  fixation hardware is demonstrated at C1 and C2.  2. Multilevel degenerative disc disease, particularly at C5-C6 and C6-C7, and moderately at C3-C4 and C4-C5.  3. No radiographically visible acute osseous injury of the cervical spine.   Dictated by (CST): Samuel Bianchi MD on 5/22/2024 at 10:32 AM     Finalized by (CST): Samuel Bianchi MD on 5/22/2024 at 10:35 AM            Imaging reviewed.  Images shown to patient and her  in the room today.  Reviewed images with Dr. King prior to clinic.    ASSESSMENT / PLAN:    ICD-10-CM   1. S/P cervical spinal fusion  Z98.1      2. Injury of vertebral artery, subsequent encounter  S15.109D      3. Hx of cervical spine surgery  Z98.890      4. Postoperative state  Z98.890      5. Encounter for postoperative wound check  Z48.89        Miranda Oliveros returns to the clinic today approximately 6 weeks s/p C1-2 fusion with Dr. King on 4/9/2024.  The patient is doing well from a surgical standpoint.  Her neck stiffness is improving.  She has been compliant with her postoperative restrictions.  She remains neurologically intact on examination.  Her x-rays demonstrate intact hardware.  No signs or symptoms concerning for infection, locally or systemically.    -Medications Prescribed: None  -Imaging Ordered: CTA head/neck to be completed same day as office visit  -Referrals Placed: None  -Follow up: 6/26/2024 at 1030 with Dr. King  -Continue bone growth stimulator  -Continue aspirin  -Continue [place] J collar at all times when up and ambulatory    Plan was reviewed and discussed in detail with the patient. Patient encouraged to call the office with any questions or concerns of new/worsening neurologic symptoms. Patient demonstrated good understanding and was agreeable with the plan.     Visit time: 25 minutes   Over 50% of that time was spent providing patient education and discussing care plan.    John Lundberg PA-C  Physician Assistant- Neurosurgery   [facility]  5/22/2024, 10:56 AM

## 2024-06-26 ENCOUNTER — HOSPITAL ENCOUNTER (OUTPATIENT)
Dept: CT IMAGING | Facility: HOSPITAL | Age: 69
Discharge: HOME | End: 2024-06-26
Attending: STUDENT IN AN ORGANIZED HEALTH CARE EDUCATION/TRAINING PROGRAM

## 2024-06-26 ENCOUNTER — OFFICE VISIT (OUTPATIENT)
Dept: SURGERY | Facility: CLINIC | Age: 69
End: 2024-06-26

## 2024-06-26 VITALS
DIASTOLIC BLOOD PRESSURE: 81 MMHG | HEIGHT: 63 IN | BODY MASS INDEX: 31.71 KG/M2 | WEIGHT: 179 LBS | SYSTOLIC BLOOD PRESSURE: 119 MMHG | HEART RATE: 84 BPM

## 2024-06-26 DIAGNOSIS — S12.01XA CLOSED STABLE BURST FRACTURE OF FIRST CERVICAL VERTEBRA, INITIAL ENCOUNTER (HCC): ICD-10-CM

## 2024-06-26 DIAGNOSIS — S15.109D: ICD-10-CM

## 2024-06-26 DIAGNOSIS — M43.6 NECK STIFFNESS: ICD-10-CM

## 2024-06-26 DIAGNOSIS — Z98.890 POSTOPERATIVE STATE: ICD-10-CM

## 2024-06-26 DIAGNOSIS — Z98.1 S/P CERVICAL SPINAL FUSION: Primary | ICD-10-CM

## 2024-06-26 DIAGNOSIS — S12.01XD CLOSED STABLE BURST FRACTURE OF FIRST CERVICAL VERTEBRA WITH ROUTINE HEALING, SUBSEQUENT ENCOUNTER: ICD-10-CM

## 2024-06-26 DIAGNOSIS — S12.000A CLOSED DISPLACED FRACTURE OF FIRST CERVICAL VERTEBRA, UNSPECIFIED FRACTURE MORPHOLOGY, INITIAL ENCOUNTER (HCC): ICD-10-CM

## 2024-06-26 DIAGNOSIS — M53.2X2 SPINAL INSTABILITY OF CERVICAL REGION: ICD-10-CM

## 2024-06-26 DIAGNOSIS — Z98.890 HX OF CERVICAL SPINE SURGERY: ICD-10-CM

## 2024-06-26 DIAGNOSIS — Z98.1 S/P CERVICAL SPINAL FUSION: ICD-10-CM

## 2024-06-26 LAB
CREAT BLD-MCNC: 0.9 MG/DL
EGFRCR SERPLBLD CKD-EPI 2021: 71 ML/MIN/1.73M2 (ref 60–?)

## 2024-06-26 PROCEDURE — 82565 ASSAY OF CREATININE: CPT

## 2024-06-26 PROCEDURE — 70498 CT ANGIOGRAPHY NECK: CPT | Performed by: STUDENT IN AN ORGANIZED HEALTH CARE EDUCATION/TRAINING PROGRAM

## 2024-06-26 PROCEDURE — 70496 CT ANGIOGRAPHY HEAD: CPT | Performed by: STUDENT IN AN ORGANIZED HEALTH CARE EDUCATION/TRAINING PROGRAM

## 2024-06-26 PROCEDURE — 3079F DIAST BP 80-89 MM HG: CPT | Performed by: STUDENT IN AN ORGANIZED HEALTH CARE EDUCATION/TRAINING PROGRAM

## 2024-06-26 PROCEDURE — 99024 POSTOP FOLLOW-UP VISIT: CPT | Performed by: STUDENT IN AN ORGANIZED HEALTH CARE EDUCATION/TRAINING PROGRAM

## 2024-06-26 PROCEDURE — 3008F BODY MASS INDEX DOCD: CPT | Performed by: STUDENT IN AN ORGANIZED HEALTH CARE EDUCATION/TRAINING PROGRAM

## 2024-06-26 PROCEDURE — 3074F SYST BP LT 130 MM HG: CPT | Performed by: STUDENT IN AN ORGANIZED HEALTH CARE EDUCATION/TRAINING PROGRAM

## 2024-06-26 NOTE — PROGRESS NOTES
Last procedure: 4/9/24  Last office visit: 5/22/24  Most recent imaging dated on: 6/26/24 - CTA Brain + CTA Carotids.   Pain Level: 0/10.   Numbness / Tingling: Patient denies numbness and tingling.   Physical Therapy / Injections: Patient denies completing any recent Physical Therapy / Injections.

## 2024-06-26 NOTE — PROGRESS NOTES
[facility]  Neurological Surgery Post-Operative Patient Clinic Note    Miranda Oliveros  8/16/1955  [id number]  PCP: Jhonathan Neal MD  Referring Provider: Bernardo Clark DO     REASON FOR VISIT:  C1 burst fracture    NEUROSURGICAL PROCEDURES TO DATE:  4/9/2024-C1-2 dorsal internal fixation of fusion (Harm's fusion)    HISTORY OF PRESENT ILLNESS 7/22/2024:  Miranda Oliveros is a(n) 68 year old female with HTN, HLD, GERD who was admitted s/p fall. History obtained from spouse at bedside as patient has no recollection of events. Per , the morning of 7/21 around 4 am he heard a noise and found her at the bottom of the stairs with blood all around her. She has been disoriented and complaining of neck pain since the fall. She has significant left sided periorbital edema. Trauma imaging was negative for intracranial injuries but did reveal a tiny non-displaced partial fracture of the left posterior arch of C1. Neurosurgery was consulted.       INTERVAL HISTORY 8/1/2023:   Ms. Oliveros presents for close follow-up of her C1 fracture. As documented in a separate TE note, I was contacted by radiology regarding additional findings on her CT of the cervical spine.  Therefore, she was brought in for close follow-up.  Today, she reports continued improvement in her condition.  Her bruising over her face is still present but much improved.  She continues to have a pretty sizable left subgaleal hematoma.  As far as her neck is concerned, she reports today by day improvement of her neck pain.  She is not necessitating c-collar, not even for comfort.  Her pain is controlled with Tylenol and anti-inflammatories.  She does feel some tension when rotating her neck in certain directions, but this is nonpainful more so than slightly restrictive.  She denies any paresthesias, balance difficulties, decreased dexterity, or any other red flags for myelopathy.     INTERVAL HISTORY 8/17/2023:    Ms. Oliveros presents for continued close follow-up of her C1 fracture.  Today, she is happy to report continued improvement in her pain.  As of last week she was still able to feel little little whenever it was humid outside.  The last 2 days this is felt much better.  She had x-rays done today prior to her visit, this was reportedly stable.  She is here to review and discuss the results.     INTERVAL HISTORY 8/28/2023:   Ms. Oliveros presents for short interval reevaluation of her C1 fracture.  She was last seen on 8/17/2023. Unfortunately, shortly after leaving the clinic she went to her daughter's house and tripped and fell in the driveway.  She ended up hitting her head against concrete on the floor.  She did not have any significant increase in neck pain or other symptoms otherwise.  However, she was convinced to present to the emergency room for evaluation to rule out any intracranial pathology.  CT of the brain was negative for acute pathology.  However, CT of the cervical spine demonstrated worsening widening of her fracture fragments and significantly more overhang on the right C1-C2 joint concerning for ligamentous injury.  An MRI was obtained while in the hospital for this reason, which failed to demonstrate rosanne ligamentous injury.  No spinal cord compression.  Today, she reports 100% compliance with her cervical collar.  She does have some pressure pain along the posterior aspect of her head after a long day of wearing the c-collar.  Otherwise she denies significant neck pain.  She denies any neurologic symptoms.     INTERVAL HISTORY 9/26/2023:   Ms. Oliveros presents for close follow-up today.  It has been approximately 1 month since her second fall that resulted in distraction of her fracture.  She reports no neck pain.  No neurologic symptoms.  Everything is stable.  She is 100% compliant with her brace.  Most of the bruising over her face and scalp are mostly resolved.  She is planning a trip soon,  and had some questions related to it.  She is also doing physical therapy and has been helping with her left shoulder.  She had x-rays on the 22nd and she is here to discuss the results.     INTERVAL HISTORY 11/14/2023:  Ms. Oliveros returns to clinic today for continued neurosurgical follow-up of her C1 burst fracture.  She had a CT of the cervical spine on 11/13/2023 and is here to discuss results.  She reports no neck pain.  She continues to be compliant with the c-collar.  She continues to deny any signs or symptoms concerning for cervical spinal cord compression.     INTERVAL HISTORY 2/15/2024:  Miranda Oliveros returns to clinic today for follow-up regarding her C1 fracture.  She recently had a CT of the cervical spine and is here to review those results.  She has been wearing cervical collar for at all times for the past 6 months.  She has no cervical pain but when there is a change in weather (rainy or cold) there is an arthritic type pain.  She continues to deny any neurologic changes.  She remains neurologically stable.     INTERVAL HISTORY 4/4/2024:  Miranda Oliveros returns to clinic today for preoperative discussions.  She is scheduled to undergo posterior cervical fixation and fusion on 4/9/2024.  She recently had an MRI of the cervical spine on 3/21/2024 and is here to discuss those results.  I answered all her and her 's questions.    INTERVAL HISTORY 4/24/2024 (from John Lundberg PA-C):  The patient reports that she has been doing well since surgery. She has some neck and shoulder stiffness, but denies any pain. No radicular pain, numbness, tingling, or weakness. No new neurologic complaints. No constitutional symptoms or symptoms concerning for sepsis. Denies any issues with her incision site, other than some mild pruritus. She reports compliance with her [place] J collar at all times when up and ambulatory. She removes the collar when she is sitting or laying at home. She was able to wean  herself off of the narcotic medications. She takes Robaxin and Tylenol as needed for discomfort relief. She has also been compliant with her bone growth stimulator.     INTERVAL HISTORY 2024 (from John Lundberg PA-C):  Miranda Oliveros continues to do well since surgery. She is intermittently utilizing Robaxin for neck stiffness, but feels that this is improving. She has no new neurologic complaints. No constitutional symptoms or symptoms concerning for sepsis. No issues with her incision site. She has continued to use her bone growth stimulator. She has been utilizing her [place] J collar when up and ambulatory for longer distances. She does not utilize the collar when traveling within the house, typically, unless she is navigating stairs or doing an activity.     INTERVAL HISTORY 2024:  Miranda Oliveros returns to clinic today for 12-week postoperative follow-up.  She has been doing well since surgery with minimal neck pain.  She continues to wear the collar and we discussed discontinuing this at this point in time.  She also continues to take aspirin without significant issues.  She is here to discuss her most recent CT angio of the cervical spine.  She is neurologically at her baseline.    PAST MEDICAL HISTORY:  Past Medical History:    Allergic rhinitis due to pollen    Carcinoma in situ of cervix uteri    Cataract    Esophageal reflux    High blood pressure    High cholesterol    Hyperlipidemia    Menopause    Osteoarthritis    generalized    Pure hypercholesterolemia    Rosacea    Tubular adenoma of colon    Visual impairment    glasses     PAST SURGICAL HISTORY:  Past Surgical History:   Procedure Laterality Date              Colonoscopy N/A 2021    Procedure: COLONOSCOPY with polypectomy;  Surgeon: Sivakumar Benson MD;  Location: [facility]    Colonoscopy,biopsy  2012    Procedure: COLONOSCOPY, POSSIBLE BIOPSY, POSSIBLE POLYPECTOMY 36045;  Surgeon:  Sivakumar Benson MD;  Location: [facility]    Colonoscopy,diagnostic N/A 5/14/2016    Procedure: COLONOSCOPY, POSSIBLE BIOPSY, POSSIBLE POLYPECTOMY 98348;  Surgeon: Sivakumar Benson MD;  Location: [facility]    Ct heart w/ calcium scoring  2/16/2009    calcium score of 0    Ct heart w/ calcium scoring N/A 08/08/2019    calcium score is zero    Impact tooth rem bony w/comp Bilateral 1973    wisdom teeth    Knee replacement surgery Right 02/21/2017    right    Laser surgery of cervix  1/1986    Peripheral vascular screening historical conv Bilateral 8/1/2016    PAD screen is normal    Total knee replacement Right 02/22/2017     FAMILY HISTORY:  family history includes Alcohol and Other Disorders Associated in her maternal grandfather; Arthritis in her sister; Breast Cancer in her maternal great-grandmother; Breast Cancer (age of onset: 56) in her sister; Cancer in her paternal grandfather; Cancer (age of onset: 74) in her mother; Cataracts in her mother; Clotting Disorder in her sister; Dementia in her paternal grandmother; Diabetes in her father, paternal grandfather, and sister; Ear Problems in her father; Eye Problems in her maternal grandmother; Heart Disease in her father and maternal grandmother; Heart Disorder in her father and paternal grandfather; Heart Surgery (age of onset: 55) in her father; High Cholesterol in her maternal grandmother and sister; Hypertension in her father, mother, and sister; Kidney Disease in her sister; Lipids in her maternal grandmother; Musculo-skelatal Disorder in her maternal grandmother; Neurological Disorder in her paternal grandmother; Obesity in her sister; Other in her sister; Pulmonary Disease in her maternal grandmother.    SOCIAL HISTORY:   reports that she quit smoking about 41 years ago. Her smoking use included cigarettes. She started smoking about 47 years ago. She has a 6 pack-year smoking history. She has never used smokeless tobacco.  She reports current alcohol use of about 7.0 standard drinks of alcohol per week. She reports that she does not use drugs.    ALLERGIES:  Allergies   Allergen Reactions    Amoxicillin-Pot Clavulanate RASH     No peeling/ blisters  No organ damage     MEDICATIONS:  Current Outpatient Medications on File Prior to Visit   Medication Sig Dispense Refill    amLODIPine 5 MG Oral Tab Take 1 tablet (5 mg total) by mouth daily.      mupirocin 2 % External Ointment Apply 1 Application topically 2 (two) times daily.      methocarbamol 500 MG Oral Tab Take 1 tablet (500 mg total) by mouth 3 (three) times daily as needed. 60 tablet 0    aspirin 81 MG Oral Chew Tab Chew 1 tablet (81 mg total) by mouth daily. 90 tablet 1    oxyCODONE 5 MG Oral Tab Take 1 tablet (5 mg total) by mouth every 4 (four) hours as needed. 30 tablet 0    acetaminophen 500 MG Oral Tab Take 1 tablet (500 mg total) by mouth every 4 (four) hours as needed. 120 tablet 0    Naloxone HCl 4 MG/0.1ML Nasal Liquid 4 mg by Nasal route as needed. If patient remains unresponsive, repeat dose in other nostril 2-5 minutes after first dose. 1 kit 0    losartan 100 MG Oral Tab       Vitamin C 500 MG Oral Tab Take 1 tablet (500 mg total) by mouth daily.      montelukast 10 MG Oral Tab Take 1 tablet (10 mg total) by mouth nightly. 90 tablet 3    atorvastatin 10 MG Oral Tab TAKE 1 TABLET(10 MG) BY MOUTH EVERY DAY 90 tablet 3    Cyanocobalamin (VITAMIN B 12) 100 MCG Oral Lozenge Take 1,000 mcg by mouth daily.   30 lozenge 0    Vitamin D3 2000 units Oral Cap Take 2 capsules (4,000 Units total) by mouth daily. 180 capsule 3    cetirizine 10 MG Oral Tab Take 1 tablet (10 mg total) by mouth daily.      Omeprazole Magnesium 20 MG Oral Tab EC Take 1 tablet (20 mg total) by mouth every morning. 28 tablet 0     No current facility-administered medications on file prior to visit.     REVIEW OF SYSTEMS:  All other systems were reviewed and were negative except for those previously  mentioned in the HPI    PHYSICAL EXAMINATION:  General: No acute distress.  Respiratory: Non-labored respirations bilaterally. No audible wheezing  Cardiovascular: Extremities warm and well-perfused.  Abdomen: Soft, nontender, nondistended.   Musculoskeletal: Moves all extremities well, symmetrically.  Extremities: No edema.     NEUROLOGIC EXAMINATION:  Mental status: Alert and oriented x 3  Speech: Clear, fluent  Cranial nerves: PERRLA, EOMI, face symmetric, with normal strength and sensation, tongue and palate midline, SCM 5/5 bilaterally  Motor:                           RIGHT  Delt 5/5   Bic 5/5  Tri 5/5   HI 5/5    5/5  IP 5/5   Quad 5/5   Ham 5/5   AT 5/5   EHL 5/5 [place]pin 5/5                          LEFT    Delt 5/5   Bic 5/5  Tri 5/5   HI 5/5    5/5  IP 5/5   Quad 5/5   Ham 5/5   AT 5/5   EHL 5/5 Crispin 5/5            No pronator drift  Tone: Normal  Atrophy/Fasciculations: None  Sensation: Normal to light touch, symmetric, no neglect  Cerebellar: Normal finger nose finger  Gait: Normal, nondistressed heel toe tandem gait      Reflexes: 2+ throughout, symmetric, no Ophelia's    IMAGING:  CTA brain and neck 6/26/2024: No hemodynamic stenosis or dissection of the cervical carotid or vertebral arteries.  Previous questionable stenosis of the left vertebral artery at the level of C2 foramen transversarium is either improved or was related to streak artifact previously.  Postoperative changes related to C1-2 posterior cervical fusion without signs of complication.    ASSESSMENT:  Ms. Oliveros is now status post C1-2 for management of a C1 burst fracture.  She is doing well postoperatively.  CTA after surgery there is a question of vertebral artery stenosis versus dissection, this appears to have been either artifact or has improved since the most recent CTA demonstrates resolution.  She continues to remain take aspirin without issues.  I advised her to take it for a total of 6 months postop.  We also  discussed discontinuing the cervical collar at this time.  She will return to see me in 3 months with x-rays.     Plan:  - RTC 3 months (6 months postop)  - XR cervical prior to next  - PT ordered    Bartolo King MD  Neurological Surgery    [address], [address]  [place] [address]  [phone]  Pager [phone]  6/26/2024 11:09 AM      This note was created using a voice-recognition transcribing system. Incorrect words or phrases may have been missed during proofreading. Please interpret accordingly.    Total Time    Post-Operative Patient Total Time       30  minutes.      Activities       Preparing to see the patient (chart/tests/imaging review).       Obtaining and/or reviewing separately obtained history.       Performing a medically appropriate examination and/or evaluation.       Counseling and educating the patient/family/caregiver.       Ordering medications, tests, or procedures.       Referring and communicating with other health care professionals (when not separately reported).       Documenting clincal information in the electronic or other health record.       Independently interpreting results (not separately reported).    Communicating results to the patient/family/caregiver.    Care coordination (not separately reported).

## 2024-06-27 ENCOUNTER — PATIENT MESSAGE (OUTPATIENT)
Dept: SURGERY | Facility: CLINIC | Age: 69
End: 2024-06-27

## 2024-06-27 DIAGNOSIS — S12.01XA CLOSED STABLE BURST FRACTURE OF FIRST CERVICAL VERTEBRA, INITIAL ENCOUNTER (HCC): ICD-10-CM

## 2024-06-27 DIAGNOSIS — S12.000A CLOSED DISPLACED FRACTURE OF FIRST CERVICAL VERTEBRA, UNSPECIFIED FRACTURE MORPHOLOGY, INITIAL ENCOUNTER (HCC): ICD-10-CM

## 2024-06-27 DIAGNOSIS — S15.109D: ICD-10-CM

## 2024-06-27 DIAGNOSIS — M53.2X2 SPINAL INSTABILITY OF CERVICAL REGION: ICD-10-CM

## 2024-06-27 DIAGNOSIS — M43.6 NECK STIFFNESS: ICD-10-CM

## 2024-06-27 DIAGNOSIS — Z98.1 S/P CERVICAL SPINAL FUSION: Primary | ICD-10-CM

## 2024-06-27 DIAGNOSIS — Z98.890 HX OF CERVICAL SPINE SURGERY: ICD-10-CM

## 2024-06-27 NOTE — TELEPHONE ENCOUNTER
From: Miranda Oliveros  To: Bartolo King  Sent: 6/27/2024 12:00 PM CDT  Subject: Pt referral    Hi. Could you please fax my pt orders over to [facility] at [phone]?   Thank you!

## 2024-06-27 NOTE — TELEPHONE ENCOUNTER
New external PT order signed per protocol and faxed to [facility] per patient's request.       msg sent informing patient.

## 2024-10-09 ENCOUNTER — HOSPITAL ENCOUNTER (OUTPATIENT)
Dept: GENERAL RADIOLOGY | Facility: HOSPITAL | Age: 69
Discharge: HOME | End: 2024-10-09
Attending: STUDENT IN AN ORGANIZED HEALTH CARE EDUCATION/TRAINING PROGRAM
Payer: COMMERCIAL

## 2024-10-09 ENCOUNTER — OFFICE VISIT (OUTPATIENT)
Dept: SURGERY | Facility: CLINIC | Age: 69
End: 2024-10-09
Payer: COMMERCIAL

## 2024-10-09 VITALS
DIASTOLIC BLOOD PRESSURE: 86 MMHG | BODY MASS INDEX: 31.89 KG/M2 | HEIGHT: 63 IN | HEART RATE: 95 BPM | WEIGHT: 180 LBS | OXYGEN SATURATION: 95 % | SYSTOLIC BLOOD PRESSURE: 128 MMHG

## 2024-10-09 DIAGNOSIS — S12.01XA CLOSED STABLE BURST FRACTURE OF FIRST CERVICAL VERTEBRA, INITIAL ENCOUNTER (HCC): ICD-10-CM

## 2024-10-09 DIAGNOSIS — S15.109D: ICD-10-CM

## 2024-10-09 DIAGNOSIS — M53.2X2 SPINAL INSTABILITY OF CERVICAL REGION: ICD-10-CM

## 2024-10-09 DIAGNOSIS — Z98.1 S/P CERVICAL SPINAL FUSION: Primary | ICD-10-CM

## 2024-10-09 DIAGNOSIS — Z98.890 HX OF CERVICAL SPINE SURGERY: ICD-10-CM

## 2024-10-09 DIAGNOSIS — Z98.890 POSTOPERATIVE STATE: ICD-10-CM

## 2024-10-09 DIAGNOSIS — M43.6 NECK STIFFNESS: ICD-10-CM

## 2024-10-09 DIAGNOSIS — Z98.1 S/P CERVICAL SPINAL FUSION: ICD-10-CM

## 2024-10-09 DIAGNOSIS — S12.01XD CLOSED STABLE BURST FRACTURE OF FIRST CERVICAL VERTEBRA WITH ROUTINE HEALING, SUBSEQUENT ENCOUNTER: ICD-10-CM

## 2024-10-09 DIAGNOSIS — S12.000A CLOSED DISPLACED FRACTURE OF FIRST CERVICAL VERTEBRA, UNSPECIFIED FRACTURE MORPHOLOGY, INITIAL ENCOUNTER (HCC): ICD-10-CM

## 2024-10-09 PROCEDURE — 99214 OFFICE O/P EST MOD 30 MIN: CPT | Performed by: STUDENT IN AN ORGANIZED HEALTH CARE EDUCATION/TRAINING PROGRAM

## 2024-10-09 PROCEDURE — 3008F BODY MASS INDEX DOCD: CPT | Performed by: STUDENT IN AN ORGANIZED HEALTH CARE EDUCATION/TRAINING PROGRAM

## 2024-10-09 PROCEDURE — 3079F DIAST BP 80-89 MM HG: CPT | Performed by: STUDENT IN AN ORGANIZED HEALTH CARE EDUCATION/TRAINING PROGRAM

## 2024-10-09 PROCEDURE — 72040 X-RAY EXAM NECK SPINE 2-3 VW: CPT | Performed by: STUDENT IN AN ORGANIZED HEALTH CARE EDUCATION/TRAINING PROGRAM

## 2024-10-09 PROCEDURE — G2211 COMPLEX E/M VISIT ADD ON: HCPCS | Performed by: STUDENT IN AN ORGANIZED HEALTH CARE EDUCATION/TRAINING PROGRAM

## 2024-10-09 PROCEDURE — 3074F SYST BP LT 130 MM HG: CPT | Performed by: STUDENT IN AN ORGANIZED HEALTH CARE EDUCATION/TRAINING PROGRAM

## 2024-10-09 NOTE — PROGRESS NOTES
Established patient:  Reason for follow up: 6 month post op        Cervcal 1 to cervical 2 posterior fixation and fusion Bilateral General   POSTERIOR CERVICAL LAMINECTOMY FORAMINOTOMY 1 LEV           Numeric Rating Scale:        Pain at Present:  0/10       Distribution of Pain:    bilateral    Most recent treatments for Current Pain Condition:   Physical Therapy and Surgery  Response to treatment: some relief    New imaging or testing since your last office visit:      X-Ray cervical spine

## 2024-10-09 NOTE — PROGRESS NOTES
[facility]  Neurological Surgery Established Patient Clinic Note    Miranda Oliveros  8/16/1955  [id number]  PCP: Jhonathan Neal MD  Referring Provider: Bernardo Clark DO     REASON FOR VISIT:  C1 burst fracture     NEUROSURGICAL PROCEDURES TO DATE:  4/9/2024-C1-2 dorsal internal fixation of fusion (Harm's fusion)     HISTORY OF PRESENT ILLNESS 7/22/2024:  Miranda Oliveros is a(n) 69 year old female with HTN, HLD, GERD who was admitted s/p fall. History obtained from spouse at bedside as patient has no recollection of events. Per , the morning of 7/21 around 4 am he heard a noise and found her at the bottom of the stairs with blood all around her. She has been disoriented and complaining of neck pain since the fall. She has significant left sided periorbital edema. Trauma imaging was negative for intracranial injuries but did reveal a tiny non-displaced partial fracture of the left posterior arch of C1. Neurosurgery was consulted.       INTERVAL HISTORY 8/1/2023:   Ms. Oliveros presents for close follow-up of her C1 fracture. As documented in a separate TE note, I was contacted by radiology regarding additional findings on her CT of the cervical spine.  Therefore, she was brought in for close follow-up.  Today, she reports continued improvement in her condition.  Her bruising over her face is still present but much improved.  She continues to have a pretty sizable left subgaleal hematoma.  As far as her neck is concerned, she reports today by day improvement of her neck pain.  She is not necessitating c-collar, not even for comfort.  Her pain is controlled with Tylenol and anti-inflammatories.  She does feel some tension when rotating her neck in certain directions, but this is nonpainful more so than slightly restrictive.  She denies any paresthesias, balance difficulties, decreased dexterity, or any other red flags for myelopathy.     INTERVAL HISTORY 8/17/2023:   Ms.  Domo presents for continued close follow-up of her C1 fracture.  Today, she is happy to report continued improvement in her pain.  As of last week she was still able to feel little little whenever it was humid outside.  The last 2 days this is felt much better.  She had x-rays done today prior to her visit, this was reportedly stable.  She is here to review and discuss the results.     INTERVAL HISTORY 8/28/2023:   Ms. Oliveros presents for short interval reevaluation of her C1 fracture.  She was last seen on 8/17/2023. Unfortunately, shortly after leaving the clinic she went to her daughter's house and tripped and fell in the driveway.  She ended up hitting her head against concrete on the floor.  She did not have any significant increase in neck pain or other symptoms otherwise.  However, she was convinced to present to the emergency room for evaluation to rule out any intracranial pathology.  CT of the brain was negative for acute pathology.  However, CT of the cervical spine demonstrated worsening widening of her fracture fragments and significantly more overhang on the right C1-C2 joint concerning for ligamentous injury.  An MRI was obtained while in the hospital for this reason, which failed to demonstrate rosanne ligamentous injury.  No spinal cord compression.  Today, she reports 100% compliance with her cervical collar.  She does have some pressure pain along the posterior aspect of her head after a long day of wearing the c-collar.  Otherwise she denies significant neck pain.  She denies any neurologic symptoms.     INTERVAL HISTORY 9/26/2023:   Ms. Oliveros presents for close follow-up today.  It has been approximately 1 month since her second fall that resulted in distraction of her fracture.  She reports no neck pain.  No neurologic symptoms.  Everything is stable.  She is 100% compliant with her brace.  Most of the bruising over her face and scalp are mostly resolved.  She is planning a trip soon, and  had some questions related to it.  She is also doing physical therapy and has been helping with her left shoulder.  She had x-rays on the 22nd and she is here to discuss the results.     INTERVAL HISTORY 11/14/2023:  Ms. Oliveros returns to clinic today for continued neurosurgical follow-up of her C1 burst fracture.  She had a CT of the cervical spine on 11/13/2023 and is here to discuss results.  She reports no neck pain.  She continues to be compliant with the c-collar.  She continues to deny any signs or symptoms concerning for cervical spinal cord compression.     INTERVAL HISTORY 2/15/2024:  Miranda Oliveros returns to clinic today for follow-up regarding her C1 fracture.  She recently had a CT of the cervical spine and is here to review those results.  She has been wearing cervical collar for at all times for the past 6 months.  She has no cervical pain but when there is a change in weather (rainy or cold) there is an arthritic type pain.  She continues to deny any neurologic changes.  She remains neurologically stable.     INTERVAL HISTORY 4/4/2024:  iMranda Oliveros returns to clinic today for preoperative discussions.  She is scheduled to undergo posterior cervical fixation and fusion on 4/9/2024.  She recently had an MRI of the cervical spine on 3/21/2024 and is here to discuss those results.  I answered all her and her 's questions.     INTERVAL HISTORY 4/24/2024 (from John Lundberg PA-C):  The patient reports that she has been doing well since surgery. She has some neck and shoulder stiffness, but denies any pain. No radicular pain, numbness, tingling, or weakness. No new neurologic complaints. No constitutional symptoms or symptoms concerning for sepsis. Denies any issues with her incision site, other than some mild pruritus. She reports compliance with her [place] J collar at all times when up and ambulatory. She removes the collar when she is sitting or laying at home. She was able to wean  herself off of the narcotic medications. She takes Robaxin and Tylenol as needed for discomfort relief. She has also been compliant with her bone growth stimulator.      INTERVAL HISTORY 5/22/2024 (from John Lundberg PA-C):  Miranda Oliveros continues to do well since surgery. She is intermittently utilizing Robaxin for neck stiffness, but feels that this is improving. She has no new neurologic complaints. No constitutional symptoms or symptoms concerning for sepsis. No issues with her incision site. She has continued to use her bone growth stimulator. She has been utilizing her [place] J collar when up and ambulatory for longer distances. She does not utilize the collar when traveling within the house, typically, unless she is navigating stairs or doing an activity.      INTERVAL HISTORY 6/26/2024:  Miranda Oliveros returns to clinic today for 12-week postoperative follow-up.  She has been doing well since surgery with minimal neck pain.  She continues to wear the collar and we discussed discontinuing this at this point in time.  She also continues to take aspirin without significant issues.  She is here to discuss her most recent CT angio of the cervical spine.  She is neurologically at her baseline.    INTERVAL HISTORY 10/9/2024:  Miranda Oliveros returns to clinic today for continued postoperative follow-up.  She is now 6 months out from surgery.  She has no pain whatsoever.  She continues to use her bone stimulator without any problems.  She also continues to take aspirin without any issues.  At her last visit we cleared her from collar use and is doing really well with this.    PAST MEDICAL HISTORY:  Past Medical History:    Allergic rhinitis due to pollen    Carcinoma in situ of cervix uteri    Cataract    Esophageal reflux    High blood pressure    High cholesterol    Hyperlipidemia    Menopause    Osteoarthritis    generalized    Pure hypercholesterolemia    Rosacea    Tubular adenoma of colon    Visual  impairment    glasses     PAST SURGICAL HISTORY:  Past Surgical History:   Procedure Laterality Date              Colonoscopy N/A 2021    Procedure: COLONOSCOPY with polypectomy;  Surgeon: Sivakumar Benson MD;  Location: [facility]    Colonoscopy,biopsy  2012    Procedure: COLONOSCOPY, POSSIBLE BIOPSY, POSSIBLE POLYPECTOMY 39467;  Surgeon: Sivakumar Benson MD;  Location: [facility]    Colonoscopy,diagnostic N/A 2016    Procedure: COLONOSCOPY, POSSIBLE BIOPSY, POSSIBLE POLYPECTOMY 90548;  Surgeon: Sivakumar Benson MD;  Location: [facility]    Ct heart w/ calcium scoring  2009    calcium score of 0    Ct heart w/ calcium scoring N/A 2019    calcium score is zero    Impact tooth rem bony w/comp Bilateral     wisdom teeth    Knee replacement surgery Right 2017    right    Laser surgery of cervix  1986    Peripheral vascular screening historical conv Bilateral 2016    PAD screen is normal    Total knee replacement Right 2017     FAMILY HISTORY:  family history includes Alcohol and Other Disorders Associated in her maternal grandfather; Arthritis in her sister; Breast Cancer in her maternal great-grandmother; Breast Cancer (age of onset: 56) in her sister; Cancer in her paternal grandfather; Cancer (age of onset: 74) in her mother; Cataracts in her mother; Clotting Disorder in her sister; Dementia in her paternal grandmother; Diabetes in her father, paternal grandfather, and sister; Ear Problems in her father; Eye Problems in her maternal grandmother; Heart Disease in her father and maternal grandmother; Heart Disorder in her father and paternal grandfather; Heart Surgery (age of onset: 55) in her father; High Cholesterol in her maternal grandmother and sister; Hypertension in her father, mother, and sister; Kidney Disease in her sister; Lipids in her maternal grandmother; Musculo-skelatal Disorder in her  maternal grandmother; Neurological Disorder in her paternal grandmother; Obesity in her sister; Other in her sister; Pulmonary Disease in her maternal grandmother.    SOCIAL HISTORY:   reports that she quit smoking about 41 years ago. Her smoking use included cigarettes. She started smoking about 47 years ago. She has a 6 pack-year smoking history. She has never used smokeless tobacco. She reports current alcohol use of about 7.0 standard drinks of alcohol per week. She reports that she does not use drugs.    ALLERGIES:  Allergies[1]    MEDICATIONS:  Medications Ordered Prior to Encounter[2]    REVIEW OF SYSTEMS:  All other systems were reviewed and were negative except for those previously mentioned in the HPI    PHYSICAL EXAMINATION:  General: No acute distress.  Respiratory: Non-labored respirations bilaterally. No audible wheezing  Cardiovascular: Extremities warm and well-perfused.  Abdomen: Soft, nontender, nondistended.   Musculoskeletal: Moves all extremities well, symmetrically.  Extremities: No edema.     NEUROLOGIC EXAMINATION:  Mental status: Alert and oriented x 3  Speech: Clear, fluent  Cranial nerves: PERRLA, EOMI, face symmetric, with normal strength and sensation, tongue and palate midline, SCM 5/5 bilaterally  Motor:                           RIGHT  Delt 5/5   Bic 5/5  Tri 5/5   HI 5/5    5/5  IP 5/5   Quad 5/5   Ham 5/5   AT 5/5   EHL 5/5 [place] 5/5                          LEFT    Delt 5/5   Bic 5/5  Tri 5/5   HI 5/5    5/5  IP 5/5   Quad 5/5   Ham 5/5   AT 5/5   EHL 5/5 [place]pin 5/5            No pronator drift  Tone: Normal  Atrophy/Fasciculations: None  Sensation: Normal to light touch, symmetric, no neglect  Cerebellar: Normal finger nose finger  Gait: Normal, nondistressed heel toe tandem gait      Reflexes: 2+ throughout, symmetric, no Ophelia's    IMAGING:  XR cervical 10/9/2024: Postoperative changes related to C1-2 fixation without any complicating features.  No hardware  complications.    ASSESSMENT:  Ms. Oliveros is status post C1-2 for management of a C1 burst fracture.  She continues doing well postoperatively.  CTA after surgery there is a question of vertebral artery stenosis versus dissection, this appears to have been either artifact or has improved since the most recent CTA demonstrates resolution.  I had advised her to now stop taking aspirin as it has been 6 months.  She continues to use her bone stimulator and recommended that she continue to use it intermittently until her 1 year anniversary.  She will return to see me at her 1 year anniversary.    Plan:  -RTC 6 months (12 months postop)  -XR cervical prior to next    Bartolo King MD  Neurological Surgery    [facility]  [address], [address]  [place] [address]  [phone]  Pager [phone]  10/9/2024 11:40 AM      This note was created using a voice-recognition transcribing system. Incorrect words or phrases may have been missed during proofreading. Please interpret accordingly.    Total Time    Established Patient Total Time       30  minutes.      Activities       Preparing to see the patient (chart/tests/imaging review).       Obtaining and/or reviewing separately obtained history.       Performing a medically appropriate examination and/or evaluation.       Counseling and educating the patient/family/caregiver.       Ordering medications, tests, or procedures.       Referring and communicating with other health care professionals (when not separately reported).       Documenting clincal information in the electronic or other health record.       Independently interpreting results (not separately reported).    Communicating results to the patient/family/caregiver.    Care coordination (not separately reported).       [1]   Allergies  Allergen Reactions    Amoxicillin-Pot Clavulanate RASH     No peeling/ blisters  No organ damage   [2]   Current Outpatient  Medications on File Prior to Visit   Medication Sig Dispense Refill    amLODIPine 5 MG Oral Tab Take 1 tablet (5 mg total) by mouth daily.      aspirin 81 MG Oral Chew Tab Chew 1 tablet (81 mg total) by mouth daily. 90 tablet 1    acetaminophen 500 MG Oral Tab Take 1 tablet (500 mg total) by mouth every 4 (four) hours as needed. 120 tablet 0    losartan 100 MG Oral Tab       Vitamin C 500 MG Oral Tab Take 1 tablet (500 mg total) by mouth daily.      montelukast 10 MG Oral Tab Take 1 tablet (10 mg total) by mouth nightly. 90 tablet 3    atorvastatin 10 MG Oral Tab TAKE 1 TABLET(10 MG) BY MOUTH EVERY DAY 90 tablet 3    Cyanocobalamin (VITAMIN B 12) 100 MCG Oral Lozenge Take 1,000 mcg by mouth daily.   30 lozenge 0    Vitamin D3 2000 units Oral Cap Take 2 capsules (4,000 Units total) by mouth daily. 180 capsule 3    cetirizine 10 MG Oral Tab Take 1 tablet (10 mg total) by mouth daily.      Omeprazole Magnesium 20 MG Oral Tab EC Take 1 tablet (20 mg total) by mouth every morning. 28 tablet 0    Naloxone HCl 4 MG/0.1ML Nasal Liquid 4 mg by Nasal route as needed. If patient remains unresponsive, repeat dose in other nostril 2-5 minutes after first dose. (Patient not taking: Reported on 10/9/2024) 1 kit 0     No current facility-administered medications on file prior to visit.

## 2025-04-09 ENCOUNTER — OFFICE VISIT (OUTPATIENT)
Dept: SURGERY | Facility: CLINIC | Age: 70
End: 2025-04-09
Payer: COMMERCIAL

## 2025-04-09 ENCOUNTER — PATIENT MESSAGE (OUTPATIENT)
Dept: SURGERY | Facility: CLINIC | Age: 70
End: 2025-04-09

## 2025-04-09 ENCOUNTER — HOSPITAL ENCOUNTER (OUTPATIENT)
Dept: GENERAL RADIOLOGY | Facility: HOSPITAL | Age: 70
Discharge: HOME | End: 2025-04-09
Attending: STUDENT IN AN ORGANIZED HEALTH CARE EDUCATION/TRAINING PROGRAM
Payer: COMMERCIAL

## 2025-04-09 VITALS
HEIGHT: 63 IN | SYSTOLIC BLOOD PRESSURE: 121 MMHG | DIASTOLIC BLOOD PRESSURE: 86 MMHG | BODY MASS INDEX: 31.89 KG/M2 | WEIGHT: 180 LBS | HEART RATE: 87 BPM

## 2025-04-09 DIAGNOSIS — Z98.890 HX OF CERVICAL SPINE SURGERY: ICD-10-CM

## 2025-04-09 DIAGNOSIS — Z98.1 S/P CERVICAL SPINAL FUSION: Primary | ICD-10-CM

## 2025-04-09 DIAGNOSIS — S12.000A CLOSED DISPLACED FRACTURE OF FIRST CERVICAL VERTEBRA, UNSPECIFIED FRACTURE MORPHOLOGY, INITIAL ENCOUNTER (HCC): ICD-10-CM

## 2025-04-09 DIAGNOSIS — M53.2X2 SPINAL INSTABILITY OF CERVICAL REGION: ICD-10-CM

## 2025-04-09 DIAGNOSIS — S12.01XA CLOSED STABLE BURST FRACTURE OF FIRST CERVICAL VERTEBRA, INITIAL ENCOUNTER (HCC): ICD-10-CM

## 2025-04-09 DIAGNOSIS — S12.01XD CLOSED STABLE BURST FRACTURE OF FIRST CERVICAL VERTEBRA WITH ROUTINE HEALING, SUBSEQUENT ENCOUNTER: ICD-10-CM

## 2025-04-09 DIAGNOSIS — M43.6 NECK STIFFNESS: ICD-10-CM

## 2025-04-09 DIAGNOSIS — Z98.1 S/P CERVICAL SPINAL FUSION: ICD-10-CM

## 2025-04-09 DIAGNOSIS — S15.109D: ICD-10-CM

## 2025-04-09 PROCEDURE — 3079F DIAST BP 80-89 MM HG: CPT | Performed by: STUDENT IN AN ORGANIZED HEALTH CARE EDUCATION/TRAINING PROGRAM

## 2025-04-09 PROCEDURE — 72040 X-RAY EXAM NECK SPINE 2-3 VW: CPT | Performed by: STUDENT IN AN ORGANIZED HEALTH CARE EDUCATION/TRAINING PROGRAM

## 2025-04-09 PROCEDURE — 3074F SYST BP LT 130 MM HG: CPT | Performed by: STUDENT IN AN ORGANIZED HEALTH CARE EDUCATION/TRAINING PROGRAM

## 2025-04-09 PROCEDURE — 3008F BODY MASS INDEX DOCD: CPT | Performed by: STUDENT IN AN ORGANIZED HEALTH CARE EDUCATION/TRAINING PROGRAM

## 2025-04-09 PROCEDURE — 99214 OFFICE O/P EST MOD 30 MIN: CPT | Performed by: STUDENT IN AN ORGANIZED HEALTH CARE EDUCATION/TRAINING PROGRAM

## 2025-04-09 NOTE — PROGRESS NOTES
Patient presents for follow up s/p posterior cervical laminectomy foraminotomy 1 level completed on 4/9/2024 by Dr. Bartolo King.     Patient is feeling great, she has no complaints at this time. She has stopped using the bone stimulator at the 6 month lillian.     The following individual(s) verbally consented to be recorded using ambient AI listening technology and understand that they can each withdraw their consent to this listening technology at any point by asking the clinician to turn off or pause the recording:    Patient name: Miranda TERRA Oliveros  Additional names:  Humberto, spouse

## 2025-04-09 NOTE — TELEPHONE ENCOUNTER
Message below noted.    Patient requesting if she can continue to take daily Aspirin.    LOV 4/09/25- Note not yet complete.    Routed to Provider.

## 2025-04-09 NOTE — H&P
[facility]  Neurological Surgery Established Patient Clinic Note    Miranda Oliveros  8/16/1955  [id number]  PCP: Jhonathan Neal MD  Referring Provider: Bernardo Clark DO     REASON FOR VISIT:  C1 burst fracture     NEUROSURGICAL PROCEDURES TO DATE:  4/9/2024-C1-2 dorsal internal fixation of fusion (Harm's fusion)     HISTORY OF PRESENT ILLNESS 7/22/2024:  Miranda Oliveros is a(n) 69 year old female with HTN, HLD, GERD who was admitted s/p fall. History obtained from spouse at bedside as patient has no recollection of events. Per , the morning of 7/21 around 4 am he heard a noise and found her at the bottom of the stairs with blood all around her. She has been disoriented and complaining of neck pain since the fall. She has significant left sided periorbital edema. Trauma imaging was negative for intracranial injuries but did reveal a tiny non-displaced partial fracture of the left posterior arch of C1. Neurosurgery was consulted.       INTERVAL HISTORY 8/1/2023:   Ms. Oliveros presents for close follow-up of her C1 fracture. As documented in a separate TE note, I was contacted by radiology regarding additional findings on her CT of the cervical spine.  Therefore, she was brought in for close follow-up.  Today, she reports continued improvement in her condition.  Her bruising over her face is still present but much improved.  She continues to have a pretty sizable left subgaleal hematoma.  As far as her neck is concerned, she reports today by day improvement of her neck pain.  She is not necessitating c-collar, not even for comfort.  Her pain is controlled with Tylenol and anti-inflammatories.  She does feel some tension when rotating her neck in certain directions, but this is nonpainful more so than slightly restrictive.  She denies any paresthesias, balance difficulties, decreased dexterity, or any other red flags for myelopathy.     INTERVAL HISTORY 8/17/2023:   Ms.  Domo presents for continued close follow-up of her C1 fracture.  Today, she is happy to report continued improvement in her pain.  As of last week she was still able to feel little little whenever it was humid outside.  The last 2 days this is felt much better.  She had x-rays done today prior to her visit, this was reportedly stable.  She is here to review and discuss the results.     INTERVAL HISTORY 8/28/2023:   Ms. Oliveros presents for short interval reevaluation of her C1 fracture.  She was last seen on 8/17/2023. Unfortunately, shortly after leaving the clinic she went to her daughter's house and tripped and fell in the driveway.  She ended up hitting her head against concrete on the floor.  She did not have any significant increase in neck pain or other symptoms otherwise.  However, she was convinced to present to the emergency room for evaluation to rule out any intracranial pathology.  CT of the brain was negative for acute pathology.  However, CT of the cervical spine demonstrated worsening widening of her fracture fragments and significantly more overhang on the right C1-C2 joint concerning for ligamentous injury.  An MRI was obtained while in the hospital for this reason, which failed to demonstrate rosanne ligamentous injury.  No spinal cord compression.  Today, she reports 100% compliance with her cervical collar.  She does have some pressure pain along the posterior aspect of her head after a long day of wearing the c-collar.  Otherwise she denies significant neck pain.  She denies any neurologic symptoms.     INTERVAL HISTORY 9/26/2023:   Ms. Oliveros presents for close follow-up today.  It has been approximately 1 month since her second fall that resulted in distraction of her fracture.  She reports no neck pain.  No neurologic symptoms.  Everything is stable.  She is 100% compliant with her brace.  Most of the bruising over her face and scalp are mostly resolved.  She is planning a trip soon, and  had some questions related to it.  She is also doing physical therapy and has been helping with her left shoulder.  She had x-rays on the 22nd and she is here to discuss the results.     INTERVAL HISTORY 11/14/2023:  Ms. Oliveros returns to clinic today for continued neurosurgical follow-up of her C1 burst fracture.  She had a CT of the cervical spine on 11/13/2023 and is here to discuss results.  She reports no neck pain.  She continues to be compliant with the c-collar.  She continues to deny any signs or symptoms concerning for cervical spinal cord compression.     INTERVAL HISTORY 2/15/2024:  Miranda Oliveros returns to clinic today for follow-up regarding her C1 fracture.  She recently had a CT of the cervical spine and is here to review those results.  She has been wearing cervical collar for at all times for the past 6 months.  She has no cervical pain but when there is a change in weather (rainy or cold) there is an arthritic type pain.  She continues to deny any neurologic changes.  She remains neurologically stable.     INTERVAL HISTORY 4/4/2024:  Miranda Oliveros returns to clinic today for preoperative discussions.  She is scheduled to undergo posterior cervical fixation and fusion on 4/9/2024.  She recently had an MRI of the cervical spine on 3/21/2024 and is here to discuss those results.  I answered all her and her 's questions.     INTERVAL HISTORY 4/24/2024 (from John Lundberg PA-C):  The patient reports that she has been doing well since surgery. She has some neck and shoulder stiffness, but denies any pain. No radicular pain, numbness, tingling, or weakness. No new neurologic complaints. No constitutional symptoms or symptoms concerning for sepsis. Denies any issues with her incision site, other than some mild pruritus. She reports compliance with her [place] J collar at all times when up and ambulatory. She removes the collar when she is sitting or laying at home. She was able to wean  herself off of the narcotic medications. She takes Robaxin and Tylenol as needed for discomfort relief. She has also been compliant with her bone growth stimulator.      INTERVAL HISTORY 5/22/2024 (from John Lundberg PA-C):  Miranda Oliveros continues to do well since surgery. She is intermittently utilizing Robaxin for neck stiffness, but feels that this is improving. She has no new neurologic complaints. No constitutional symptoms or symptoms concerning for sepsis. No issues with her incision site. She has continued to use her bone growth stimulator. She has been utilizing her [place] J collar when up and ambulatory for longer distances. She does not utilize the collar when traveling within the house, typically, unless she is navigating stairs or doing an activity.      INTERVAL HISTORY 6/26/2024:  Miranda Oliveros returns to clinic today for 12-week postoperative follow-up.  She has been doing well since surgery with minimal neck pain.  She continues to wear the collar and we discussed discontinuing this at this point in time.  She also continues to take aspirin without significant issues.  She is here to discuss her most recent CT angio of the cervical spine.  She is neurologically at her baseline.    INTERVAL HISTORY 10/9/2024:  Miranda Oliveros returns to clinic today for continued postoperative follow-up.  She is now 6 months out from surgery.  She has no pain whatsoever.  She continues to use her bone stimulator without any problems.  She also continues to take aspirin without any issues.  At her last visit we cleared her from collar use and is doing really well with this.    INTERVAL HISTORY 4/9/2025:  Miranda Oliveros returns today for her 12-month postoperative visit following her posterior C1-2 fixation and fusion performed on 4/9/2024. She denies any new neurological symptoms. She notes occasional mild dizziness she attributes to seasonal allergies and some mild muscular deconditioning due to reduced  physical activity. She has not attended the gym regularly but intends to resume strength training exercises. Otherwise, she reports overall stability with no significant neck pain, no radicular symptoms, and no balance difficulties.    PAST MEDICAL HISTORY:  Past Medical History:    Allergic rhinitis due to pollen    Carcinoma in situ of cervix uteri    Cataract    Esophageal reflux    High blood pressure    High cholesterol    Hyperlipidemia    Menopause    Osteoarthritis    generalized    Pure hypercholesterolemia    Rosacea    Tubular adenoma of colon    Visual impairment    glasses     PAST SURGICAL HISTORY:  Past Surgical History:   Procedure Laterality Date              Colonoscopy N/A 2021    Procedure: COLONOSCOPY with polypectomy;  Surgeon: Sivakumar Benson MD;  Location: [facility]    Colonoscopy,biopsy  2012    Procedure: COLONOSCOPY, POSSIBLE BIOPSY, POSSIBLE POLYPECTOMY 37533;  Surgeon: Sivakumar Benson MD;  Location: [facility]    Colonoscopy,diagnostic N/A 2016    Procedure: COLONOSCOPY, POSSIBLE BIOPSY, POSSIBLE POLYPECTOMY 84149;  Surgeon: Sivakumar Benson MD;  Location: [facility]    Ct heart w/ calcium scoring  2009    calcium score of 0    Ct heart w/ calcium scoring N/A 2019    calcium score is zero    Impact tooth rem bony w/comp Bilateral 1973    wisdom teeth    Knee replacement surgery Right 2017    right    Laser surgery of cervix  1986    Peripheral vascular screening historical conv Bilateral 2016    PAD screen is normal    Total knee replacement Right 2017     FAMILY HISTORY:  family history includes Alcohol and Other Disorders Associated in her maternal grandfather; Arthritis in her sister; Breast Cancer in her maternal great-grandmother; Breast Cancer (age of onset: 56) in her sister; Cancer in her paternal grandfather; Cancer (age of onset: 74) in her mother; Cataracts in  her mother; Clotting Disorder in her sister; Dementia in her paternal grandmother; Diabetes in her father, paternal grandfather, and sister; Ear Problems in her father; Eye Problems in her maternal grandmother; Heart Disease in her father and maternal grandmother; Heart Disorder in her father and paternal grandfather; Heart Surgery (age of onset: 55) in her father; High Cholesterol in her maternal grandmother and sister; Hypertension in her father, mother, and sister; Kidney Disease in her sister; Lipids in her maternal grandmother; Musculo-skelatal Disorder in her maternal grandmother; Neurological Disorder in her paternal grandmother; Obesity in her sister; Other in her sister; Pulmonary Disease in her maternal grandmother.    SOCIAL HISTORY:   reports that she quit smoking about 42 years ago. Her smoking use included cigarettes. She started smoking about 48 years ago. She has a 6 pack-year smoking history. She has never used smokeless tobacco. She reports current alcohol use of about 7.0 standard drinks of alcohol per week. She reports that she does not use drugs.    ALLERGIES:  Allergies[1]    MEDICATIONS:  Medications Ordered Prior to Encounter[2]    REVIEW OF SYSTEMS:  All other systems were reviewed and were negative except for those previously mentioned in the HPI    PHYSICAL EXAMINATION:  General: No acute distress.  Respiratory: Non-labored respirations bilaterally. No audible wheezing  Cardiovascular: Extremities warm and well-perfused.  Abdomen: Soft, nontender, nondistended.   Musculoskeletal: Moves all extremities well, symmetrically.  Extremities: No edema.     NEUROLOGIC EXAMINATION:  Mental status: Alert and oriented x 3  Speech: Clear, fluent  Cranial nerves: PERRLA, EOMI, face symmetric, with normal strength and sensation, tongue and palate midline, SCM 5/5 bilaterally  Motor:                           RIGHT  Delt 5/5   Bic 5/5  Tri 5/5   HI 5/5    5/5  IP 5/5   Quad 5/5   Ham 5/5   AT 5/5    EHL 5/5 Crispin 5/5                          LEFT    Delt 5/5   Bic 5/5  Tri 5/5   HI 5/5    5/5  IP 5/5   Quad 5/5   Ham 5/5   AT 5/5   EHL 5/5 Crispin 5/5            No pronator drift  Tone: Normal  Atrophy/Fasciculations: None  Sensation: Normal to light touch, symmetric, no neglect  Cerebellar: Normal finger nose finger  Gait: Normal, nondistressed heel toe tandem gait      Reflexes: 2+ throughout, symmetric, no Ophelia's    IMAGING:  XR cervical 4/9/2025: Postoperative changes related to C1-2 fixation without any complicating features.  No hardware complications.      ASSESSMENT:  Ms. Oliveros is one year status post posterior cervical (C1-2) internal fixation and fusion for a C1 burst fracture. She has no substantive complaints and no signs of hardware failure on imaging. Her mild occasional dizziness is likely related to sinus and/or musculoskeletal factors rather than a spinal etiology. Her muscle weakness is most consistent with overall inactivity, rather than a surgical complication. Given her stable postoperative course, normal neurological exam, and lack of concerning findings on imaging, she is recovering appropriately.    PLAN:  - Continue routine activities.  - Emphasized strengthening exercises for neck, shoulder, and core musculature three times weekly to improve overall stability and prevent deconditioning.  - Recommend heat therapy or supportive pillows (e.g., cervical contour pillows) for episodic muscle soreness.  - Instructed patient to return for evaluation if new neck pain, neurological symptoms, or trauma occurs. Otherwise, no further routine imaging is indicated at this time.    Bartolo King MD  Neurological Surgery    [facility]  [address] [place] [address], [address] [place] [id number]  [phone]  Pager [phone]  4/9/2025 11:45 AM      This note was created using a voice-recognition transcribing system. Incorrect words or phrases may have been  missed during proofreading. Please interpret accordingly.    Total Time    Established Patient Total Time       30  minutes.      Activities       Preparing to see the patient (chart/tests/imaging review).       Obtaining and/or reviewing separately obtained history.       Performing a medically appropriate examination and/or evaluation.       Counseling and educating the patient/family/caregiver.       Ordering medications, tests, or procedures.       Referring and communicating with other health care professionals (when not separately reported).       Documenting clincal information in the electronic or other health record.       Independently interpreting results (not separately reported).    Communicating results to the patient/family/caregiver.    Care coordination (not separately reported).         [1]   Allergies  Allergen Reactions    Amoxicillin-Pot Clavulanate RASH     No peeling/ blisters  No organ damage   [2]   Current Outpatient Medications on File Prior to Visit   Medication Sig Dispense Refill    amLODIPine 5 MG Oral Tab Take 1 tablet (5 mg total) by mouth daily.      aspirin 81 MG Oral Chew Tab Chew 1 tablet (81 mg total) by mouth daily. 90 tablet 1    acetaminophen 500 MG Oral Tab Take 1 tablet (500 mg total) by mouth every 4 (four) hours as needed. 120 tablet 0    Naloxone HCl 4 MG/0.1ML Nasal Liquid 4 mg by Nasal route as needed. If patient remains unresponsive, repeat dose in other nostril 2-5 minutes after first dose. (Patient not taking: Reported on 10/9/2024) 1 kit 0    losartan 100 MG Oral Tab       Vitamin C 500 MG Oral Tab Take 1 tablet (500 mg total) by mouth daily.      montelukast 10 MG Oral Tab Take 1 tablet (10 mg total) by mouth nightly. 90 tablet 3    atorvastatin 10 MG Oral Tab TAKE 1 TABLET(10 MG) BY MOUTH EVERY DAY 90 tablet 3    Cyanocobalamin (VITAMIN B 12) 100 MCG Oral Lozenge Take 1,000 mcg by mouth daily.   30 lozenge 0    Vitamin D3 2000 units Oral Cap Take 2 capsules  (4,000 Units total) by mouth daily. 180 capsule 3    cetirizine 10 MG Oral Tab Take 1 tablet (10 mg total) by mouth daily.      Omeprazole Magnesium 20 MG Oral Tab EC Take 1 tablet (20 mg total) by mouth every morning. 28 tablet 0     No current facility-administered medications on file prior to visit.

## (undated) DEVICE — TOTAL KNEE CDS: Brand: MEDLINE INDUSTRIES, INC.

## (undated) DEVICE — GAMMEX® PI HYBRID SIZE 8, STERILE POWDER-FREE SURGICAL GLOVE, POLYISOPRENE AND NEOPRENE BLEND: Brand: GAMMEX

## (undated) DEVICE — STOCKINETTE HYDROMED 8X6

## (undated) DEVICE — VIOLET BRAIDED (POLYGLACTIN 910), SYNTHETIC ABSORBABLE SUTURE: Brand: COATED VICRYL

## (undated) DEVICE — OD 3603760 PRE-CUT 3.5MM X 60MM
Type: IMPLANTABLE DEVICE | Site: NECK | Status: NON-FUNCTIONAL
Brand: INFINITY™ OCCIPITOCERVICAL UPPER THORACIC SYSTEM
Removed: 2024-04-09

## (undated) DEVICE — DRILL BIT NAVG3606010 NAVIGATED: Brand: NAVIGATED INFINITY™ OCCIPITOCERVICAL UPPER THORACIC SYSTEM

## (undated) DEVICE — Device

## (undated) DEVICE — C-ARMOR C-ARM EQUIPMENT COVERS CLEAR STERILE UNIVERSAL FIT 12 PER CASE: Brand: C-ARMOR

## (undated) DEVICE — GLOVE SUR 8 SENSICARE PI MIC PIP CRM PWD F

## (undated) DEVICE — KIT EVAC 400CC DIA1/8IN H PAT 12.5IN 3 SPR

## (undated) DEVICE — FEE RENTAL MD SURG USE

## (undated) DEVICE — SUT VCRL 0 18IN CT-1 ABSRB VLT CR L36MM 1/2

## (undated) DEVICE — MONITORING NEUROPHYSIOLOGICAL

## (undated) DEVICE — KENDALL SCD EXPRESS SLEEVES, KNEE LENGTH, MEDIUM: Brand: KENDALL SCD

## (undated) DEVICE — BANDAGE ROLL,100% COTTON, 6 PLY, LARGE: Brand: KERLIX

## (undated) DEVICE — PACK CDS LAMINECTOMY

## (undated) DEVICE — GAMMEX® PI HYBRID SIZE 7.5, STERILE POWDER-FREE SURGICAL GLOVE, POLYISOPRENE AND NEOPRENE BLEND: Brand: GAMMEX

## (undated) DEVICE — INTENDED USE FOR SURGICAL MARKING ON INTACT SKIN, ALSO PROVIDES A PERMANENT METHOD OF IDENTIFYING OBJECTS IN THE OPERATING ROOM: Brand: WRITESITE® PLUS MINI PREP RESISTANT MARKER

## (undated) DEVICE — ZIMMER® STERILE DISPOSABLE TOURNIQUET CUFF WITH PLC, DUAL PORT, SINGLE BLADDER, 34 IN. (86 CM)

## (undated) DEVICE — SUTURE VICRYL 2-0 FSL

## (undated) DEVICE — STERILE POLYISOPRENE POWDER-FREE SURGICAL GLOVES: Brand: PROTEXIS

## (undated) DEVICE — PISTOL GRIP SKIN STAPLER: Brand: MULTIFIRE PREMIUM

## (undated) DEVICE — 2.0MM NEURO (MATCH HEAD) SOFT TOUCH

## (undated) DEVICE — 2T11 #2 PDO 36 X 36: Brand: 2T11 #2 PDO 36 X 36

## (undated) DEVICE — MARKER SKIN XR REFLCT LF SPHR DISP

## (undated) DEVICE — Device: Brand: STABLECUT®

## (undated) DEVICE — PENCIL ES BTTN SWCH W/ TIP HOLSTER E-Z CLN

## (undated) DEVICE — SUT ETHLN 2-0 18IN FS NABSRB BLK 26MM 3/8 CIR

## (undated) DEVICE — SOL  .9 1000ML BTL

## (undated) DEVICE — GLOVE SUR 7.5 SENSICARE PI MIC PIP CRM PWD F

## (undated) DEVICE — SCREWDRIVER SUR TORQ LIMIT SHFT

## (undated) DEVICE — SHEET,DRAPE,53X77,STERILE: Brand: MEDLINE

## (undated) DEVICE — TRAY CATH 16FR F INCL BARDX IC COMPLT CARE

## (undated) DEVICE — SPONGE GZ 4X4IN COT 12 PLY TYP VII WVN C

## (undated) DEVICE — SOLUTION IRRIG 1000ML 0.9% NACL USP BTL

## (undated) DEVICE — PACK DRP UNIV W/ BK TBL MAYO STD BTM TOP SIDE

## (undated) DEVICE — TELFA ADHESIVE ISLAND DRESSING: Brand: TELFA

## (undated) DEVICE — WRAP COOLING KNEE W/ICE PILLOW

## (undated) DEVICE — DISPOSABLE SLIM BIPOLAR FORCEPS, NON-STICK,: Brand: SPETZLER-MALIS

## (undated) DEVICE — CODMAN® SURGICAL PATTIES 1/2" X1 1/2" (1.27CM X 3.81CM): Brand: CODMAN®

## (undated) DEVICE — AEGIS 1" DISK 4MM HOLE, PEEL OPEN: Brand: MEDLINE

## (undated) DEVICE — 3.0MM PRECISION NEURO (MATCH HEAD)

## (undated) DEVICE — GOWN,SIRUS,FABRIC-REINFORCED,X-LARGE: Brand: MEDLINE

## (undated) DEVICE — BOWL CEMENT MIX QUICK-VAC

## (undated) DEVICE — C-ARM: Brand: UNBRANDED

## (undated) DEVICE — PREMIUM WET SKIN PREP TRAY: Brand: MEDLINE INDUSTRIES, INC.

## (undated) DEVICE — 3M™ TEGADERM™ TRANSPARENT FILM DRESSING FRAME STYLE, 1626W, 4 IN X 4-3/4 IN (10 CM X 12 CM), 50/CT 4CT/CASE: Brand: 3M™ TEGADERM™

## (undated) DEVICE — TISSEEL SPRAY SET

## (undated) DEVICE — STERILE NEOPRENE POWDER-FREE SURGICAL GLOVES WITH NITRILE COATING: Brand: PROTEXIS

## (undated) DEVICE — ROD 3603770 PRE-CUT 3.5MM X 70MM
Type: IMPLANTABLE DEVICE | Site: NECK | Status: NON-FUNCTIONAL
Brand: INFINITY™ OCCIPITOCERVICAL UPPER THORACIC SYSTEM
Removed: 2024-04-09

## (undated) DEVICE — UCSS CANNULATED DRILL BIT STERILE: Brand: MEDTRONIC REUSABLE INSTRUMENT

## (undated) DEVICE — MAYFIELD® DISPOSABLE ADULT SKULL PIN (STAINLESS STEEL): Brand: MAYFIELD®

## (undated) DEVICE — KIT HEMSTAT MTRX 8ML PORCINE GEL HUM THROM

## (undated) DEVICE — ELECTRODE ES 2.75IN PTFE BLDE MOD E-Z CLN

## (undated) NOTE — IP AVS SNAPSHOT
[facility] [place] [address] [place], [address] ~ [phone]                Discharge Summary   2/21/2017    Miguel Angel Jacobs           Admission Information        Provider Department    2/21/2017 Hannah Zavala MD  3sw-A Take 1 capsule (100 mg total) by mouth 2 (two) times daily. Taina Fisher                              Ferrous Sulfate 325 (65 Fe) MG Tabs        Take 1 tablet (325 mg total) by mouth daily.     Samantha Gutierrez OTC - Ondansetron HCl 8 MG tablet              Patient Instructions       Knee Replacement Discharge Instructions    Activity    Bathing  ?  No tub baths, pools, or saunas until cleared by surgeon (about 4-6 weeks because it takes that long for the incision on 2/28/17. It is then removed and replaced with a sterile gauze and paper tape dressing. This new dressing is changed daily and as needed. See instructions below.     FOR DRY GAUZE DRESSING:  ? Change dressing daily using dry sterile gauze and paper tape once weeks and then begin to decrease how often you are taking it. ? Take pain medication as prescribed with food, especially before therapy, allowing 30-60 minutes to take effect. ? Do not drink alcohol while on pain medication. ?  Keep pain manageable; pain or are visiting. ? Keep bed linen/clothing freshly laundered. ? Do not allow others or pets to touch your incision. ? Avoid people that have colds or the flu. ?  Your surgeon may recommend that you take antibiotics before you undergo any dental or other Go directly to the ER or CALL 911 if  you:  ? become short of breath  ? have chest pain  ? cough up blood  ? have unexplained anxiety with breathing  Traveling and Handicapped parking  ?  Check with you surgeon when allowed so you don’t set yourself up for Follow up with Nestor Rojas MD. Schedule an appointment as soon as possible for a visit in 1 week.     Specialty:  Family Medicine    Contact information:    [address] [place] [address] [phone]          Follow up with [facility] ([address] [place] [address])    [address] [id number]   [phone]              Discharge Orders     Future Labs/Procedures Expected by Expires    EKG 12-LEAD  1/9/2017 (Approximate) 1/8/2018    JOINT CLASS  1/9/2017 3/9/2017    TYPE Metabolic Lab Results  (Last result in the past 90 days)    ALT Bilirubin,Total Total Protein Albumin Sodium Potassium Chloride    -- -- -- -- (02/22/17)  135 (L) (02/23/17)  4.0 (02/22/17)  103      Pending Labs     Order Current Status    SURGICAL PATHOL _____________________________________________________________________________    Medication Side Effects - Medications to be taken at home  As your caregivers, we want you to be aware of the medications you are prescribed to take and their potential SIDE E Meloxicam 15 MG Oral Tab       Use: Treat pain, fever, inflammation   Most common side effects: Stomach upset   What to report to your healthcare team: Stomach upset, unresolved pain           GI Medications     Ondansetron HCl (ZOFRAN) 8 MG tablet    doc

## (undated) NOTE — IP AVS SNAPSHOT
Patient Demographics     Address Phone E-mail Address    [address] Hicks [phone] [facility])  [phone] (Work)  [phone] [facility]) [email or web address]      Emergency Contact(s)     Name Relation Home Work Mobile    Evaristo Oliveros Spo ? Usually allowed after four to six weeks. Discuss specific work activities with your surgeon. Restrictions  ? For knee replacement surgery, follow instructions provided by physical therapy.   ? Do NOT put a pillow under your knee as it may be more dif ? Usually you will be on a blood thinner for about 2-3 weeks. ? Contact your physician if you have signs of bruising, nose bleeds or blood in your urine. Use electric razors and soft toothbrushes only.   ? Do not take aspirin while taking blood thinners un ? Contact physician if discomfort does not respond to pain medication. Body changes  ? Constipation is common with the use of narcotics. ? Eat fiber rich foods and drink plenty of fluids. ?  Use stool softeners such as Colace or Senakot while on narcot ? MARQUIS HOSE – IF ordered by your surgeon, wear these during the day and off at night. Notify your surgeon if you notice any  of the following signs  ? Separation of incision line. ? Increased redness, swelling, or warmth of skin around incision. ? space to open car doors to position yourself properly with walker to get in and out of your car safely; some parking spaces are  practically on top of each other and do not give you enough room.             SPECIAL  INSTRUCTIONS: [place] [employer]                           PRILOSEC OTC 20 MG Tbec   Generic drug:  Omeprazole Magnesium        Take 1 tablet by mouth daily.     Martin  N                           rivaroxaban 10 MG Tabs   Last time this was given:  10 mg on 2/22 [id number] Atorvastatin Calcium (LIPITOR) tab 10 mg 02/22/17 2041 Given      [id number] HYDROcodone-acetaminophen (NORCO)  MG per tab 1 tablet (Or Linked Group #1) 02/22/17 1352 Given      [id number] HYDROcodone-acetaminophen (NORCO)  MG per tab Final result    Ordering provider: Carlos A Galarza MD  02/22/17 7797 Resulting lab: [facility]    Specimen Information    Type Source Collected On   Blood  02/23/17 3086          Components       Value Reference Range Flag Lab   Potassium 4.0 3.6-5.1 mmo HISTORY OF PRESENT ILLNESS:  The patient is a 62-year-old female, followed in the office. She has ongoing disability in her knee, symptoms consistent with an advanced arthritic condition.   She has failed conservative management, activity modification, int t: 02/19/2017 12:37:23  Job [id number]  MA/     Electronically signed by Barbi Jalloh MD on 2/20/2017  4:12 PM               Consults - MD Consult Notes      Consults by Laz Villasenor DO at 2/21/2017  3:28 PM     Author:   Laz Villasenor PERIPHERAL VASCULAR SCREENING HISTORICAL CONV Bilateral 2016    Comment PAD screen is normal    OTHER      Comment          Smoking status: Former Smoker  1.00 Packs/Day  For 6.00 Years     Types: Cigarettes    Quit date: 1983     Eyes:  Sclera anicteric, No conjunctival pallor, EOMs intact. Nose: Nares normal. Septum midline. Mucosa normal. No drainage.    Throat: Lips, mucosa, and tongue normal. Teeth and gums normal.   Neck: Supple, symmetrical, trachea midline   Lungs:   Clear Author:  Roderick Troncoso PTA Service:  (none) Author Type:  Physical Therapy Assistant    Filed:  2/22/2017  2:53 PM Note Time:  2/22/2017  2:49 PM Status:  Signed    :  Roderick Troncoso PTA (Physical Therapy Assistant)            PHYSICAL THERAP Dynamic Sitting: Good  Static Standing: Fair -  Dynamic Standing: Poor +    ACTIVITY TOLERANCE  Room air  No shortness of breath    AM-PAC '6-Clicks' INPATIENT SHORT FORM - BASIC MOBILITY  How much difficulty does the patient currently have. ..  -   Turning Exercises AM Session PM Session   Ankle Pumps 10 reps 15 reps   Quad Sets 10 reps 15 reps   Glut Sets 10 reps 15 reps   Hip Abd/Add 10 reps 15 reps   Heel slides 10 reps 15 reps   Saq 10 reps 15 reps   SLR 10 reps 15 reps   Sitting Knee Flexion 10 reps 15 Goal #5      AROM 0 degrees extension to 95 degrees flexion      Goal #6           Goal Comments: Goals established on 2/21/2017 - all goals ongoing as of 2/22/2017              Physical Therapy Note by Oliver Heath PT at 2/21/2017  2:27 PM  Vers Comment Procedure: COLONOSCOPY, POSSIBLE BIOPSY, POSSIBLE POLYPECTOMY 15801;  Surgeon:  Odessa Gomez MD;  Location: [facility] N/A 5/14/2016    Comment Procedure: COLONOSCOPY, POSSIBLE BIOPSY, POSSIBLE POLYPECT -   Moving from lying on back to sitting on the side of the bed?: A Little   How much help from another person does the patient currently need. ..   -   Moving to and from a bed to a chair (including a wheelchair)?: A Little   -   Need to walk in hospital r impairments manifest themselves as functional limitations in in  bed mobilities,transfers and ambulation requiring the use of an AD and assistance with mobilities at this time.   The patient is below her baseline and would benefit from skilled inpatient PT Presenting Problem: s/p R TKA on 2/21/17    Physician Order: IP Consult to Occupational Therapy  Reason for Therapy:  ADL/IADL Dysfunction and Discharge Planning    History related to current admission: Patient is a 63year old female, with OA of R knee, s \"My sister had both of her knees done so she told me all about what to expect. \"    OBJECTIVE     Fall Risk: Standard fall risk    WEIGHT BEARING RESTRICTION  Weight Bearing Restriction: None                PAIN ASSESSMENT  Ratin  Location: R knee  Ma for donning/doffing socks and shoes; patient reports that she will have help at home for socks and will wear slip on shoes at home>standing via RW and supervision, cueing for safe hand placement> performed functional mobility to bathroom via RW and supervi this admission. Patient was able to achieve the following goals:  Patient able to toilet transfer: At supervision level or above; patient reports will have supervision at home  Patient able to dress lower extremities:  At supervision level or above; rajiv

## (undated) NOTE — LETTER
24  RE: Miranda ELLISON Domo     : 1955    Dear Dr. Neal,    This letter is to inform you that your patient has been scheduled for surgery with Dr. King on 24 at [facility]. Pre-operative clearance has been requested.  The patient has an appointment with you scheduled for 3-18-24.  Please address clearance status.     Diagnosis: cervical 1 fracture  Procedure: cervical occiput to cervical 2 posterior fixation and fusion     A Pre-operative History & Physical is needed for medical clearance within 30 days of surgery. Please address patient's active problems and potential risks of having surgery considering their medical history.  Pre-op labs are scheduled through the [facility]-Ad[facility] department. If any labs/testing are being done through the PCP office, then results should be faxed to the pre-admission testing department at [facility] at [phone]. Our pre-operative lab orders are located in our surgery order, if the patient would like these done through your office, you will need to place separate orders.     Please fax clearance letter/office visit note to our office at fax #: [phone]. Your office note must clearly indicate if the patient is medically cleared for surgery or not.    The following orders will be placed by pre-admission testing:  CBC  CMP  Type and Screen   PT/PTT/INR  MRSA/MSSA Nasal Swab  (*And any other pertinent testing based on patient's current clinical condition.)    If you have any questions, you may contact our office at [phone], option # 2.    Thank you,  Sylvie HORVATH RN, BSN  Clinical Nurse Lead  [facility]

## (undated) NOTE — LETTER
04/28/21    Wilner Oliveros      To Whom It May Concern: This letter has been written at the patient's request. The above patient was seen at [facility] for treatment of a medical condition from 4/26/2021 - 4/28/2021.     The patient may return to